# Patient Record
Sex: FEMALE | Race: WHITE | NOT HISPANIC OR LATINO | Employment: OTHER | URBAN - METROPOLITAN AREA
[De-identification: names, ages, dates, MRNs, and addresses within clinical notes are randomized per-mention and may not be internally consistent; named-entity substitution may affect disease eponyms.]

---

## 2017-01-16 ENCOUNTER — TELEPHONE (OUTPATIENT)
Dept: FAMILY MEDICINE CLINIC | Facility: CLINIC | Age: 82
End: 2017-01-16

## 2017-01-18 ENCOUNTER — TELEPHONE (OUTPATIENT)
Dept: FAMILY MEDICINE CLINIC | Facility: CLINIC | Age: 82
End: 2017-01-18

## 2017-01-18 ENCOUNTER — TRANSITIONAL CARE MANAGEMENT TELEPHONE ENCOUNTER (OUTPATIENT)
Dept: INTERNAL MEDICINE | Facility: CLINIC | Age: 82
End: 2017-01-18

## 2017-01-18 NOTE — TELEPHONE ENCOUNTER
----- Message from Melvina Winn sent at 1/18/2017  2:15 PM EST -----  Contact: DR RICHARD [facility] IS CALLING TO LET YOU KNOW THAT SHE WAS DISCHARGED HOME YESTERDAY.

## 2017-01-19 NOTE — TELEPHONE ENCOUNTER
DEONTE call already completed. Pt discharged home on 1/17/17.  Please refer to TCM call flowsheet for call documentation.

## 2017-01-20 ENCOUNTER — TELEPHONE (OUTPATIENT)
Dept: FAMILY MEDICINE CLINIC | Facility: CLINIC | Age: 82
End: 2017-01-20

## 2017-01-20 NOTE — TELEPHONE ENCOUNTER
----- Message from Theresa Arias sent at 1/20/2017 12:41 PM EST -----  Contact: JOSE MANUEL FERRELL [facility]  IS REQUESTING AN ORDER FOR 10 HOME VISITS FOR FOR HER ADL ENERGY CONSERVATIONS TECHNIQUES DUE TO SHORTNESS OF BREATH PLEASE CALL HER  [phone] OR  [phone]

## 2017-01-27 ENCOUNTER — TELEPHONE (OUTPATIENT)
Dept: FAMILY MEDICINE CLINIC | Facility: CLINIC | Age: 82
End: 2017-01-27

## 2017-01-27 NOTE — TELEPHONE ENCOUNTER
----- Message from Rosa Woodward sent at 1/26/2017 11:52 AM EST -----  Contact: JOSE MANUEL FERRELL ASKING IF     GLUCOMETER  AND STRIPS    [facility] [place]

## 2017-02-03 ENCOUNTER — TELEPHONE (OUTPATIENT)
Dept: FAMILY MEDICINE CLINIC | Facility: CLINIC | Age: 82
End: 2017-02-03

## 2017-02-03 NOTE — TELEPHONE ENCOUNTER
----- Message from Theresa Arias sent at 2/3/2017 11:25 AM EST -----  Contact: JOSE MANUEL VALDEZ [facility]  THE OCCUPATIONAL THERAPIST WITH [facility] CALLED TO ADVISE THAT DUE TO AN INSURANCE CHANGE THEY WERE UNABLE TO  MAKE THE TWO APPOINTMENT SCHEDULED THIS WEEK FOR THE PATIENT IF THERE ARE ANY QUESTIONS PLEASE CALL [facility]  [phone]

## 2017-02-08 ENCOUNTER — TELEPHONE (OUTPATIENT)
Dept: FAMILY MEDICINE CLINIC | Facility: CLINIC | Age: 82
End: 2017-02-08

## 2017-02-08 RX ORDER — AMLODIPINE BESYLATE 10 MG/1
10 TABLET ORAL DAILY
Qty: 30 TABLET | Refills: 2 | Status: SHIPPED | OUTPATIENT
Start: 2017-02-08 | End: 2017-02-17

## 2017-02-08 NOTE — TELEPHONE ENCOUNTER
I am going to add norvasc to regimen for better BP control.  Script sent in, call with update in the next 2 weeks and recommend f/u appointment to recheck this INB at that time.

## 2017-02-08 NOTE — TELEPHONE ENCOUNTER
Kristan with  PT called pt's b/p was 220/85. She is aware that it does run on the high side however she was wanting to know if there was a parameter were you wanted to see her or for them not to do PT with her. Pt isnot having any sx. [phone]

## 2017-02-09 ENCOUNTER — TELEPHONE (OUTPATIENT)
Dept: FAMILY MEDICINE CLINIC | Facility: CLINIC | Age: 82
End: 2017-02-09

## 2017-02-13 ENCOUNTER — TELEPHONE (OUTPATIENT)
Dept: FAMILY MEDICINE CLINIC | Facility: CLINIC | Age: 82
End: 2017-02-13

## 2017-02-13 NOTE — TELEPHONE ENCOUNTER
Elevated BP has been an issue when pt does not take her medicine.  Safe BP level for pt to work with would be less than 160/90

## 2017-02-13 NOTE — TELEPHONE ENCOUNTER
----- Message from Rosa Ng sent at 2/13/2017 11:42 AM EST -----  Contact: VALERIY MARTINEZ FROM hospitals  PT'S BP IS RUNNING HIGH; 240/95  IS THERE A RANGE THAT IS STILL SAFE TO WORK WITH  HER?      [phone]  MESSAGE OK

## 2017-02-13 NOTE — TELEPHONE ENCOUNTER
I tried to call and discuss with pt no answer on either number. [facility] should be [facility]'s from what she got filled in December and January. I spoke with jigna she will discuss with pt when she see her in the morning.

## 2017-02-15 ENCOUNTER — TELEPHONE (OUTPATIENT)
Dept: FAMILY MEDICINE CLINIC | Facility: CLINIC | Age: 82
End: 2017-02-15

## 2017-02-15 NOTE — TELEPHONE ENCOUNTER
----- Message from Amy Quinteros sent at 2/15/2017 12:26 PM EST -----  Contact: Andres Kolb from [facility] is calling to let Dr. Campos know that patient's blood pressure was 177/78 today. Please call Kennedi [phone].

## 2017-02-15 NOTE — TELEPHONE ENCOUNTER
i spoke with jolynn who confirmed with pt that she is taking the lisinporil only. It looks like she should be on two or more b/p meds. Per pharm she has not been getting them filled however jolynn did say she has a bottle that appears to be lisinopril just not sure she is taking. Not OT again today due to b/p readings. If she continues to be noncompliant she may be dicharged from

## 2017-02-15 NOTE — TELEPHONE ENCOUNTER
She should be on lisinopril and HCTZ.  If BP still high than norvasc has been sent in to pharmacy.

## 2017-02-16 ENCOUNTER — TELEPHONE (OUTPATIENT)
Dept: FAMILY MEDICINE CLINIC | Facility: CLINIC | Age: 82
End: 2017-02-16

## 2017-02-16 NOTE — TELEPHONE ENCOUNTER
----- Message from Melvina Winn sent at 2/16/2017  8:17 AM EST -----  [facility] IS CALLING TO LET YOU KNOW THAT HER BP IS ELEVATED THIS MORNING /80. DO YOU WANT HER TO CALL EVERY TIME HER BP IS ELEVATED OR JUST CONTINUE TO MONITER HER FOR THE TWO WEEK PERIOD AND GET BACK WITH YOU AT THE END OF THE TWO WEEK PERIOD?  [phone]

## 2017-02-17 RX ORDER — ATENOLOL 50 MG/1
50 TABLET ORAL DAILY
Qty: 30 TABLET | Refills: 1 | Status: SHIPPED | OUTPATIENT
Start: 2017-02-17 | End: 2017-02-17

## 2017-02-17 RX ORDER — AMLODIPINE BESYLATE 10 MG/1
10 TABLET ORAL DAILY
Qty: 30 TABLET | Refills: 2 | Status: SHIPPED | OUTPATIENT
Start: 2017-02-17 | End: 2017-12-26 | Stop reason: SDUPTHER

## 2017-02-17 NOTE — TELEPHONE ENCOUNTER
After calling pt again.....she did  the norvasc today. I confirmed her has lisinopril, hctc, and norvac on hand and advised her to take all three. Atenolol cancelled.

## 2017-02-21 ENCOUNTER — TELEPHONE (OUTPATIENT)
Dept: FAMILY MEDICINE CLINIC | Facility: CLINIC | Age: 82
End: 2017-02-21

## 2017-02-21 NOTE — TELEPHONE ENCOUNTER
----- Message from Rosa Ng sent at 2/21/2017  1:52 PM EST -----  Contact: CHRIS [facility]  PT PAIN LEVEL IS 9 OUT OF 10-SHE DOESN'T WANT TO GO SEE THE DOCTOR  THE NURSE IS CONCERNED THAT THE NEW BLOOD PRESSURE MEDS MAY  BE EFFECTING IT;THE BP IS WITHIN NORMAL RANGE-NURSE WANTED TO  MAKE SURE SHE REPORTED IT    [phone]

## 2017-02-23 ENCOUNTER — TELEPHONE (OUTPATIENT)
Dept: FAMILY MEDICINE CLINIC | Facility: CLINIC | Age: 82
End: 2017-02-23

## 2017-02-23 RX ORDER — NITROGLYCERIN 0.4 MG/1
TABLET SUBLINGUAL
Qty: 10 TABLET | Refills: 3 | Status: SHIPPED | OUTPATIENT
Start: 2017-02-23

## 2017-02-23 NOTE — TELEPHONE ENCOUNTER
----- Message from Melvina Winn sent at 2/23/2017  1:08 PM EST -----  Contact: DR RICHARD [facility]  [facility]    /  Sent in Nitro earlier today (Marely)

## 2017-02-23 NOTE — TELEPHONE ENCOUNTER
----- Message from Mayelin Jones sent at 2/23/2017 12:47 PM EST -----  Contact: DR. RICHARD[facility] CALLED WITH QUESTIONS ABOUT THE PT'S MEDICATIONS.   SHE WANTED TO KNOW THE FOLLOWING QUESTIONS AND INFORMATION      # IS THE PT STILL SUPPOSED TO BE TAKING amitriptyline (ELAVIL) 10 MG tablet,  HER RANITIDINE 2 TIMES DAILY AND  81 MG ASPRIN?     # PT  HAS BEEN WAKING UP WITH HEADACHES DAILY BUT HER BP IS FINE.     # PT HAS NOT PICKED UP HER ATENOLOL DUE TO SHE IS NOT ABLE TO AFFORD IT.     CB #   [phone]

## 2017-02-24 ENCOUNTER — TELEPHONE (OUTPATIENT)
Dept: FAMILY MEDICINE CLINIC | Facility: CLINIC | Age: 82
End: 2017-02-24

## 2017-02-24 NOTE — TELEPHONE ENCOUNTER
----- Message from Rosa Woodward sent at 2/24/2017  3:09 PM EST -----  Contact: VALERIA ATKINSON FROM [facility] CALLING TO A VERBAL ORDER FOR:    MONITOR O2 STATS WITH PHYSICAL THERAPY    [phone], PLEASE LEAVE A VOICE MAIL IF SHE DOESN'T ANSWER

## 2017-03-01 ENCOUNTER — TELEPHONE (OUTPATIENT)
Dept: FAMILY MEDICINE CLINIC | Facility: CLINIC | Age: 82
End: 2017-03-01

## 2017-03-01 NOTE — TELEPHONE ENCOUNTER
KANWAL [facility] called advised pt c/o abdominal pain which is chronic for her. They referred her to her pcp which she refused. They also wanted a verbal order for  consult. Order given

## 2017-03-07 DIAGNOSIS — G89.29 CHRONIC BILATERAL LOW BACK PAIN WITH BILATERAL SCIATICA: ICD-10-CM

## 2017-03-07 DIAGNOSIS — M54.41 CHRONIC BILATERAL LOW BACK PAIN WITH BILATERAL SCIATICA: ICD-10-CM

## 2017-03-07 DIAGNOSIS — M54.42 CHRONIC BILATERAL LOW BACK PAIN WITH BILATERAL SCIATICA: ICD-10-CM

## 2017-03-08 ENCOUNTER — TELEPHONE (OUTPATIENT)
Dept: FAMILY MEDICINE CLINIC | Facility: CLINIC | Age: 82
End: 2017-03-08

## 2017-03-08 NOTE — TELEPHONE ENCOUNTER
"Kennedi in OT dept at [facility] called to report pt reported to her her BLE pain is a \"7\" out of \"10\". However, she states this is her \"normal\" and she \"is used to it\".  States her pain level doesn't vary much and wasn't very specific about the pain.  Kennedi reports pt looks good. States she just has to report it. Kennedi may be reached at [phone] if needed.  "

## 2017-03-10 ENCOUNTER — TELEPHONE (OUTPATIENT)
Dept: FAMILY MEDICINE CLINIC | Facility: CLINIC | Age: 82
End: 2017-03-10

## 2017-03-10 NOTE — TELEPHONE ENCOUNTER
----- Message from Melvina Winn sent at 3/10/2017 11:36 AM EST -----  Contact: DR RICHARD [facility]  OCCUPATIONAL THERAPIST FROM [facility] IS CALLING TO REPORT CHEST PAIN. SHE IS SAYING IT IS A 3 OUT OF 10 ON PAIN SCALE. THE OCCUPATIONAL THERAPIST HAS REFERRED HER TO US AND SHE IS REFUSING TO COME HERE. JUST WANTED TO REPORT WHAT IS GOING ON.  [phone]

## 2017-03-12 RX ORDER — GABAPENTIN 600 MG/1
TABLET ORAL
Qty: 270 TABLET | Refills: 0 | Status: SHIPPED | OUTPATIENT
Start: 2017-03-12 | End: 2017-07-01 | Stop reason: SDUPTHER

## 2017-03-24 ENCOUNTER — TELEPHONE (OUTPATIENT)
Dept: FAMILY MEDICINE CLINIC | Facility: CLINIC | Age: 82
End: 2017-03-24

## 2017-03-24 NOTE — TELEPHONE ENCOUNTER
----- Message from Mayelin Jones sent at 3/24/2017  1:04 PM EDT -----  Contact: Dr. negron; Marla from [facility]   Home health called to let Dr. negron know paula is making the pt dizzy and she is having falls with it but she is catching her self before she actually falls. She has only been on the medication for about a weeks.     Marla milian with [facility]   [phone]

## 2017-03-24 NOTE — TELEPHONE ENCOUNTER
Spoke with Marla and advised of MD comment. Marla verbalized understanding and will make sure pt gets message.

## 2017-03-24 NOTE — TELEPHONE ENCOUNTER
Writer took a call from Kennedi Lambert from OT with [facility] who is asking for cont OT services with pt for 2 wks. V.O. Given. Verbalized understanding. KANWAL

## 2017-03-28 ENCOUNTER — TELEPHONE (OUTPATIENT)
Dept: FAMILY MEDICINE CLINIC | Facility: CLINIC | Age: 82
End: 2017-03-28

## 2017-03-28 NOTE — TELEPHONE ENCOUNTER
----- Message from Theresa Arias sent at 3/28/2017  1:33 PM EDT -----  Contact: JOSE MANUEL FERRELL  CALLED TO REQUEST A VERBAL DUE TO PATIENT IS EXPERIENCING DIZZINESS AND EDEMA ON HER RIGHT FOOT SHE WOULD LIKE A CALL BACK  [phone]

## 2017-03-28 NOTE — TELEPHONE ENCOUNTER
Spoke with kary pt was d/c a few weeks ago however she is now having edema, she fell and having orthostatic hypotension. They would like to go back to seeing her this week. Verbal order given

## 2017-03-30 ENCOUNTER — TELEPHONE (OUTPATIENT)
Dept: FAMILY MEDICINE CLINIC | Facility: CLINIC | Age: 82
End: 2017-03-30

## 2017-04-03 NOTE — TELEPHONE ENCOUNTER
Spoke with son? Advised pt did not go to ER and that she is feeling fine. Could not get much info from him

## 2017-04-04 ENCOUNTER — TELEPHONE (OUTPATIENT)
Dept: FAMILY MEDICINE CLINIC | Facility: CLINIC | Age: 82
End: 2017-04-04

## 2017-04-04 NOTE — TELEPHONE ENCOUNTER
----- Message from Theresa Arias sent at 4/4/2017 12:44 PM EDT -----  Contact: JOSE MANUEL/BIBI GOMEZ [facility]  CALLED TO ADVISE THAT THE PATIENT IS EXPERIENCING AN ADEMA AND ORTHOSETIC HYPOTENSION ON HER LEFT FOOT ONLY. WHICH IS A NEW SYMPTOM FOR HER. SHE WANTED TO ADVISE IF THEIR ARE QUESTIONS PLEASE CALL HER  [phone]

## 2017-04-05 ENCOUNTER — TELEPHONE (OUTPATIENT)
Dept: FAMILY MEDICINE CLINIC | Facility: CLINIC | Age: 82
End: 2017-04-05

## 2017-04-05 NOTE — TELEPHONE ENCOUNTER
----- Message from Theresa Arias sent at 4/5/2017  2:06 PM EDT -----  Contact[facility]  IS SEEKING VERBAL ORDERS FOR THE PATIENTS  OCCUPATIONAL THERAPY FOR 4 WEEKS   PLEASE CALL HER  [phone]

## 2017-04-07 ENCOUNTER — TELEPHONE (OUTPATIENT)
Dept: FAMILY MEDICINE CLINIC | Facility: CLINIC | Age: 82
End: 2017-04-07

## 2017-04-07 NOTE — TELEPHONE ENCOUNTER
----- Message from Melvina Winn sent at 4/7/2017  1:43 PM EDT -----  Contact: DR RICHARD HOME HEALTH CALL  [facility] IS CALLING TO REPORT THAT THE PAIN IS HAVING  8 OUT 10 ON THE PAIN SCALE  OF HER RIGHT LEG AND RADIATES DOWN. PATIENT SAYS ITS CHRONIC BUT SHE HAS NEVER MENTIONED IT BEFORE TO THE HOME HEALTH NURSE.  [phone]

## 2017-04-10 ENCOUNTER — TELEPHONE (OUTPATIENT)
Dept: FAMILY MEDICINE CLINIC | Facility: CLINIC | Age: 82
End: 2017-04-10

## 2017-04-10 RX ORDER — ALBUTEROL SULFATE 90 UG/1
AEROSOL, METERED RESPIRATORY (INHALATION)
Qty: 1 INHALER | Refills: 5 | Status: SHIPPED | OUTPATIENT
Start: 2017-04-10 | End: 2019-01-01 | Stop reason: SDUPTHER

## 2017-04-10 NOTE — TELEPHONE ENCOUNTER
----- Message from Rosa Woodward sent at 4/10/2017 12:39 PM EDT -----  Contact: JOSE MANUEL  PATIENT WOULD LIKE TO HAVE SOME INHALERS SENT TO THE PHARMACY. SHE DIDN'T KNOW THE NAME, SHE SAID YOU CALLED THEM INTO THE PHARMACY FOR HER.    HAVING TROUBLE BREATHING, CAN'T GET OUT OF THE HOUSE.    [facility]    [phone]

## 2017-04-20 ENCOUNTER — TELEPHONE (OUTPATIENT)
Dept: FAMILY MEDICINE CLINIC | Facility: CLINIC | Age: 82
End: 2017-04-20

## 2017-04-20 NOTE — TELEPHONE ENCOUNTER
KANWAL:    Kennedi Martin (O.T.) from [facility] [phone] called because she was out at patients home and she complained of acute chest pain, started before Kennedi got there. Patient reported the pain was a 5 out of 10 but Kennedi was worried because it was new. I informed her to have pt go to ER for eval and then sched a fu appt with us.

## 2017-04-21 ENCOUNTER — TELEPHONE (OUTPATIENT)
Dept: FAMILY MEDICINE CLINIC | Facility: CLINIC | Age: 82
End: 2017-04-21

## 2017-04-21 NOTE — TELEPHONE ENCOUNTER
----- Message from Rosa Ng sent at 4/21/2017  1:43 PM EDT -----  Contact: [facility]  PT WAS SEEN TODAY-PT NEEDS TO GO TO ER AND PT REFUSES-  EMT WAS CALL YESTERDAY BUT REFUSED TO GO-VAMSI FEELS SHE  HAS HAD A STROKE-PT HAS STRENGTH WHEN SITTING BUT DOESN'T  WALK WELL-VITALS ARE GOOD-HAVING NO STRENGTH IN RIGHT ARM  OR LEG  HER SISTER HAS PASTED AWAY YESTERDAY  NURSE FEELS SHE IS AT A LOSE BECAUSE PT WILL NOT GO   TO ER  VAMSI [phone]  MESSAGE OK

## 2017-06-02 ENCOUNTER — HOSPITAL ENCOUNTER (OUTPATIENT)
Facility: HOSPITAL | Age: 82
Setting detail: OBSERVATION
Discharge: HOME | End: 2017-06-05
Attending: EMERGENCY MEDICINE | Admitting: INTERNAL MEDICINE

## 2017-06-02 ENCOUNTER — APPOINTMENT (OUTPATIENT)
Dept: GENERAL RADIOLOGY | Facility: HOSPITAL | Age: 82
End: 2017-06-02

## 2017-06-02 DIAGNOSIS — Z86.79 HISTORY OF CORONARY ARTERY DISEASE: ICD-10-CM

## 2017-06-02 DIAGNOSIS — I24.9 ACUTE CORONARY SYNDROME (HCC): Primary | ICD-10-CM

## 2017-06-02 PROBLEM — I20.0 UNSTABLE ANGINA (HCC): Status: ACTIVE | Noted: 2017-06-02

## 2017-06-02 LAB
ALBUMIN SERPL-MCNC: 4.2 G/DL (ref 3.2–4.8)
ALBUMIN/GLOB SERPL: 1.2 G/DL (ref 1.5–2.5)
ALP SERPL-CCNC: 69 U/L (ref 25–100)
ALT SERPL W P-5'-P-CCNC: 5 U/L (ref 7–40)
ANION GAP SERPL CALCULATED.3IONS-SCNC: 2 MMOL/L (ref 3–11)
ARTICHOKE IGE QN: 134 MG/DL (ref 0–130)
AST SERPL-CCNC: 10 U/L (ref 0–33)
BASOPHILS # BLD AUTO: 0.03 10*3/MM3 (ref 0–0.2)
BASOPHILS NFR BLD AUTO: 0.5 % (ref 0–1)
BILIRUB SERPL-MCNC: 0.3 MG/DL (ref 0.3–1.2)
BNP SERPL-MCNC: 410 PG/ML (ref 0–100)
BUN BLD-MCNC: 15 MG/DL (ref 9–23)
BUN/CREAT SERPL: 18.8 (ref 7–25)
CALCIUM SPEC-SCNC: 9.8 MG/DL (ref 8.7–10.4)
CHLORIDE SERPL-SCNC: 105 MMOL/L (ref 99–109)
CHOLEST SERPL-MCNC: 212 MG/DL (ref 0–200)
CK MB SERPL-CCNC: 1.6 NG/ML (ref 0–5)
CO2 SERPL-SCNC: 33 MMOL/L (ref 20–31)
CREAT BLD-MCNC: 0.8 MG/DL (ref 0.6–1.3)
DEPRECATED RDW RBC AUTO: 54.1 FL (ref 37–54)
EOSINOPHIL # BLD AUTO: 0.07 10*3/MM3 (ref 0.1–0.3)
EOSINOPHIL NFR BLD AUTO: 1.2 % (ref 0–3)
ERYTHROCYTE [DISTWIDTH] IN BLOOD BY AUTOMATED COUNT: 16.1 % (ref 11.3–14.5)
GFR SERPL CREATININE-BSD FRML MDRD: 69 ML/MIN/1.73
GLOBULIN UR ELPH-MCNC: 3.5 GM/DL
GLUCOSE BLD-MCNC: 189 MG/DL (ref 70–100)
HBA1C MFR BLD: 5.6 % (ref 4.8–5.6)
HCT VFR BLD AUTO: 40.6 % (ref 34.5–44)
HDLC SERPL-MCNC: 56 MG/DL (ref 40–60)
HGB BLD-MCNC: 11.7 G/DL (ref 11.5–15.5)
HOLD SPECIMEN: NORMAL
HOLD SPECIMEN: NORMAL
IMM GRANULOCYTES # BLD: 0.01 10*3/MM3 (ref 0–0.03)
IMM GRANULOCYTES NFR BLD: 0.2 % (ref 0–0.6)
LIPASE SERPL-CCNC: 22 U/L (ref 6–51)
LYMPHOCYTES # BLD AUTO: 1.25 10*3/MM3 (ref 0.6–4.8)
LYMPHOCYTES NFR BLD AUTO: 22 % (ref 24–44)
MCH RBC QN AUTO: 26.4 PG (ref 27–31)
MCHC RBC AUTO-ENTMCNC: 28.8 G/DL (ref 32–36)
MCV RBC AUTO: 91.4 FL (ref 80–99)
MONOCYTES # BLD AUTO: 0.28 10*3/MM3 (ref 0–1)
MONOCYTES NFR BLD AUTO: 4.9 % (ref 0–12)
NEUTROPHILS # BLD AUTO: 4.03 10*3/MM3 (ref 1.5–8.3)
NEUTROPHILS NFR BLD AUTO: 71.2 % (ref 41–71)
PLATELET # BLD AUTO: 222 10*3/MM3 (ref 150–450)
PMV BLD AUTO: 9.9 FL (ref 6–12)
POTASSIUM BLD-SCNC: 3.4 MMOL/L (ref 3.5–5.5)
PROT SERPL-MCNC: 7.7 G/DL (ref 5.7–8.2)
RBC # BLD AUTO: 4.44 10*6/MM3 (ref 3.89–5.14)
SODIUM BLD-SCNC: 140 MMOL/L (ref 132–146)
TRIGL SERPL-MCNC: 144 MG/DL (ref 0–150)
TROPONIN I SERPL-MCNC: 0.01 NG/ML (ref 0–0.07)
TROPONIN I SERPL-MCNC: 0.01 NG/ML (ref 0–0.07)
TROPONIN I SERPL-MCNC: 0.07 NG/ML
WBC NRBC COR # BLD: 5.67 10*3/MM3 (ref 3.5–10.8)
WHOLE BLOOD HOLD SPECIMEN: NORMAL
WHOLE BLOOD HOLD SPECIMEN: NORMAL

## 2017-06-02 PROCEDURE — 83880 ASSAY OF NATRIURETIC PEPTIDE: CPT | Performed by: EMERGENCY MEDICINE

## 2017-06-02 PROCEDURE — 93005 ELECTROCARDIOGRAM TRACING: CPT

## 2017-06-02 PROCEDURE — 25010000002 ENOXAPARIN PER 10 MG: Performed by: PHYSICIAN ASSISTANT

## 2017-06-02 PROCEDURE — 83690 ASSAY OF LIPASE: CPT | Performed by: EMERGENCY MEDICINE

## 2017-06-02 PROCEDURE — 84484 ASSAY OF TROPONIN QUANT: CPT | Performed by: PHYSICIAN ASSISTANT

## 2017-06-02 PROCEDURE — 85025 COMPLETE CBC W/AUTO DIFF WBC: CPT | Performed by: EMERGENCY MEDICINE

## 2017-06-02 PROCEDURE — 82553 CREATINE MB FRACTION: CPT | Performed by: PHYSICIAN ASSISTANT

## 2017-06-02 PROCEDURE — 99220 PR INITIAL OBSERVATION CARE/DAY 70 MINUTES: CPT | Performed by: INTERNAL MEDICINE

## 2017-06-02 PROCEDURE — 80061 LIPID PANEL: CPT | Performed by: PHYSICIAN ASSISTANT

## 2017-06-02 PROCEDURE — 83036 HEMOGLOBIN GLYCOSYLATED A1C: CPT | Performed by: PHYSICIAN ASSISTANT

## 2017-06-02 PROCEDURE — 84484 ASSAY OF TROPONIN QUANT: CPT

## 2017-06-02 PROCEDURE — 99285 EMERGENCY DEPT VISIT HI MDM: CPT

## 2017-06-02 PROCEDURE — G0378 HOSPITAL OBSERVATION PER HR: HCPCS

## 2017-06-02 PROCEDURE — 96372 THER/PROPH/DIAG INJ SC/IM: CPT

## 2017-06-02 PROCEDURE — 93005 ELECTROCARDIOGRAM TRACING: CPT | Performed by: PHYSICIAN ASSISTANT

## 2017-06-02 PROCEDURE — 94799 UNLISTED PULMONARY SVC/PX: CPT

## 2017-06-02 PROCEDURE — 71010 HC CHEST PA OR AP: CPT

## 2017-06-02 PROCEDURE — 80053 COMPREHEN METABOLIC PANEL: CPT | Performed by: EMERGENCY MEDICINE

## 2017-06-02 RX ORDER — ASPIRIN 81 MG/1
324 TABLET, CHEWABLE ORAL ONCE
Status: DISCONTINUED | OUTPATIENT
Start: 2017-06-02 | End: 2017-06-02

## 2017-06-02 RX ORDER — PANTOPRAZOLE SODIUM 40 MG/1
40 TABLET, DELAYED RELEASE ORAL
Status: DISCONTINUED | OUTPATIENT
Start: 2017-06-03 | End: 2017-06-05 | Stop reason: HOSPADM

## 2017-06-02 RX ORDER — LISINOPRIL 10 MG/1
20 TABLET ORAL
Status: DISCONTINUED | OUTPATIENT
Start: 2017-06-03 | End: 2017-06-04

## 2017-06-02 RX ORDER — NITROGLYCERIN 20 MG/100ML
5-200 INJECTION INTRAVENOUS
Status: DISCONTINUED | OUTPATIENT
Start: 2017-06-02 | End: 2017-06-03

## 2017-06-02 RX ORDER — ISOSORBIDE MONONITRATE 60 MG/1
60 TABLET, EXTENDED RELEASE ORAL DAILY
Status: DISCONTINUED | OUTPATIENT
Start: 2017-06-02 | End: 2017-06-05 | Stop reason: HOSPADM

## 2017-06-02 RX ORDER — LISINOPRIL 10 MG/1
30 TABLET ORAL
Status: DISCONTINUED | OUTPATIENT
Start: 2017-06-03 | End: 2017-06-02

## 2017-06-02 RX ORDER — RANITIDINE 150 MG/1
150 TABLET ORAL 2 TIMES DAILY PRN
COMMUNITY
End: 2019-01-01

## 2017-06-02 RX ORDER — SODIUM CHLORIDE 0.9 % (FLUSH) 0.9 %
10 SYRINGE (ML) INJECTION AS NEEDED
Status: DISCONTINUED | OUTPATIENT
Start: 2017-06-02 | End: 2017-06-05 | Stop reason: HOSPADM

## 2017-06-02 RX ORDER — LISINOPRIL AND HYDROCHLOROTHIAZIDE 25; 20 MG/1; MG/1
1 TABLET ORAL DAILY
COMMUNITY
End: 2017-12-26 | Stop reason: SDUPTHER

## 2017-06-02 RX ORDER — SODIUM CHLORIDE 0.9 % (FLUSH) 0.9 %
1-10 SYRINGE (ML) INJECTION AS NEEDED
Status: DISCONTINUED | OUTPATIENT
Start: 2017-06-02 | End: 2017-06-05 | Stop reason: HOSPADM

## 2017-06-02 RX ORDER — AMLODIPINE BESYLATE 10 MG/1
10 TABLET ORAL
Status: DISCONTINUED | OUTPATIENT
Start: 2017-06-03 | End: 2017-06-05 | Stop reason: HOSPADM

## 2017-06-02 RX ORDER — AMLODIPINE BESYLATE 10 MG/1
10 TABLET ORAL ONCE
Status: DISCONTINUED | OUTPATIENT
Start: 2017-06-02 | End: 2017-06-05 | Stop reason: HOSPADM

## 2017-06-02 RX ORDER — HYDROCODONE BITARTRATE AND ACETAMINOPHEN 7.5; 325 MG/1; MG/1
1 TABLET ORAL EVERY 8 HOURS PRN
Status: DISCONTINUED | OUTPATIENT
Start: 2017-06-02 | End: 2017-06-05 | Stop reason: HOSPADM

## 2017-06-02 RX ORDER — AMITRIPTYLINE HYDROCHLORIDE 25 MG/1
25 TABLET, FILM COATED ORAL NIGHTLY
Status: DISCONTINUED | OUTPATIENT
Start: 2017-06-02 | End: 2017-06-05 | Stop reason: HOSPADM

## 2017-06-02 RX ORDER — ATENOLOL 50 MG/1
50 TABLET ORAL DAILY
COMMUNITY
End: 2017-06-05 | Stop reason: HOSPADM

## 2017-06-02 RX ORDER — ASPIRIN 81 MG/1
81 TABLET ORAL DAILY
Status: DISCONTINUED | OUTPATIENT
Start: 2017-06-02 | End: 2017-06-05 | Stop reason: HOSPADM

## 2017-06-02 RX ORDER — ATORVASTATIN CALCIUM 40 MG/1
80 TABLET, FILM COATED ORAL NIGHTLY
Status: DISCONTINUED | OUTPATIENT
Start: 2017-06-02 | End: 2017-06-05 | Stop reason: HOSPADM

## 2017-06-02 RX ORDER — ROPINIROLE 2 MG/1
2 TABLET, FILM COATED ORAL NIGHTLY
COMMUNITY
End: 2019-01-01

## 2017-06-02 RX ORDER — ASPIRIN 325 MG
325 TABLET ORAL DAILY
COMMUNITY
End: 2019-01-01 | Stop reason: HOSPADM

## 2017-06-02 RX ORDER — ISOSORBIDE MONONITRATE 60 MG/1
60 TABLET, EXTENDED RELEASE ORAL DAILY
COMMUNITY
End: 2017-12-26 | Stop reason: SDUPTHER

## 2017-06-02 RX ORDER — CLOPIDOGREL BISULFATE 75 MG/1
75 TABLET ORAL DAILY
Status: DISCONTINUED | OUTPATIENT
Start: 2017-06-02 | End: 2017-06-05 | Stop reason: HOSPADM

## 2017-06-02 RX ADMIN — ISOSORBIDE MONONITRATE 60 MG: 60 TABLET, EXTENDED RELEASE ORAL at 17:43

## 2017-06-02 RX ADMIN — AMITRIPTYLINE HYDROCHLORIDE 25 MG: 25 TABLET, FILM COATED ORAL at 20:46

## 2017-06-02 RX ADMIN — METOPROLOL TARTRATE 25 MG: 25 TABLET ORAL at 20:46

## 2017-06-02 RX ADMIN — ATORVASTATIN CALCIUM 80 MG: 40 TABLET, FILM COATED ORAL at 20:45

## 2017-06-02 RX ADMIN — ALBUTEROL SULFATE 2.5 MG: 2.5 SOLUTION RESPIRATORY (INHALATION) at 18:52

## 2017-06-02 RX ADMIN — NITROGLYCERIN 10 MCG/MIN: 20 INJECTION INTRAVENOUS at 15:44

## 2017-06-02 RX ADMIN — ENOXAPARIN SODIUM 50 MG: 60 INJECTION SUBCUTANEOUS at 20:46

## 2017-06-02 RX ADMIN — CLOPIDOGREL BISULFATE 75 MG: 75 TABLET ORAL at 17:43

## 2017-06-02 RX ADMIN — HYDROCODONE BITARTRATE AND ACETAMINOPHEN 1 TABLET: 7.5; 325 TABLET ORAL at 20:46

## 2017-06-02 RX ADMIN — ASPIRIN 81 MG: 81 TABLET, COATED ORAL at 17:42

## 2017-06-02 NOTE — ED PROVIDER NOTES
Subjective   HPI Comments: Angela Berkowitz is a 82 y.o.female with a history of CAD, CHF, COPD, and DM who presents to the ED with c/o substernal chest pain and SOA which started 1 hour prior to arrival. She describes the pain in her chest as sharp and intermittent which has reduced in frequency since time of onset. She states that she took aspirin today with no relief. Her chest pain is worse with exertion or movement and improves with rest. Additionally she c/o headache, chills, abdominal pain, and leg pain but denies fevers, cough, congestion, cold symptoms, congestion, or any other acute complaints at this time.         Patient is a 82 y.o. female presenting with chest pain.   History provided by:  Patient  Chest Pain   Pain location:  Substernal area  Pain quality: sharp    Pain radiates to:  Does not radiate  Pain severity:  Moderate  Onset quality:  Sudden  Duration:  1 hour  Timing:  Intermittent  Progression:  Improving  Chronicity:  New  Context comment:  Hx CAD, CHF, COPD  Relieved by:  Rest  Worsened by:  Exertion and movement  Ineffective treatments:  None tried  Associated symptoms: abdominal pain, headache and shortness of breath    Associated symptoms: no back pain, no diaphoresis, no fever, no nausea and no vomiting    Risk factors: coronary artery disease and diabetes mellitus        Review of Systems   Constitutional: Positive for chills. Negative for diaphoresis and fever.   HENT: Negative for congestion, rhinorrhea and sore throat.    Respiratory: Positive for shortness of breath.    Cardiovascular: Positive for chest pain.   Gastrointestinal: Positive for abdominal pain. Negative for diarrhea, nausea and vomiting.   Musculoskeletal: Negative for back pain and neck pain.   Neurological: Positive for headaches.   All other systems reviewed and are negative.      Past Medical History:   Diagnosis Date   • Back pain    • CAD (coronary artery disease)    • CHF (congestive heart failure)    • Chronic  low back pain    • COPD (chronic obstructive pulmonary disease)    • Dependence on supplemental oxygen    • Diverticulosis    • DM2 (diabetes mellitus, type 2)    • DEA (generalized anxiety disorder)    • GERD (gastroesophageal reflux disease)    • History of cancer of uterus     status post hysterectomy   • History of myocardial infarction    • History of stroke    • HLD (hyperlipidemia)    • Hypertension    • Osteoarthritis of lumbar spine    • Pernicious anemia    • Unsteady gait        Allergies   Allergen Reactions   • Pneumovax [Pneumococcal Polysaccharide Vaccine]        Past Surgical History:   Procedure Laterality Date   • APPENDECTOMY     • CARDIAC CATHETERIZATION N/A 8/1/2016    Procedure: Left Heart Cath;  Surgeon: Anupam Bernal MD;  [facility]: [facility] CATH INVASIVE LOCATION;  Service:    • CHOLECYSTECTOMY     • COLONOSCOPY  09/2015   • CORONARY ARTERY BYPASS GRAFT  1997    x3 in 1997   • CORONARY STENT PLACEMENT  08/2016   • TOTAL ABDOMINAL HYSTERECTOMY      with removal of both ovaries   • UPPER GASTROINTESTINAL ENDOSCOPY  09/2015       History reviewed. No pertinent family history.    Social History     Social History   • Marital status:      Spouse name: N/A   • Number of children: N/A   • Years of education: N/A     Social History Main Topics   • Smoking status: Current Every Day Smoker     Packs/day: 0.25   • Smokeless tobacco: Never Used   • Alcohol use No   • Drug use: No   • Sexual activity: Defer     Other Topics Concern   • None     Social History Narrative         Objective   Physical Exam   Constitutional: She is oriented to person, place, and time. She appears well-developed and well-nourished.   HENT:   Head: Normocephalic and atraumatic.   Eyes: Conjunctivae are normal. Pupils are equal, round, and reactive to light.   Neck: Normal range of motion. Neck supple.   Cardiovascular: Normal rate, regular rhythm and normal heart sounds.    No murmur heard.  Pulmonary/Chest: Effort normal  and breath sounds normal. No respiratory distress.   Abdominal: Soft. There is no tenderness.   Musculoskeletal: Normal range of motion. She exhibits no edema.   Neurological: She is alert and oriented to person, place, and time.   Skin: Skin is warm and dry.   Psychiatric: She has a normal mood and affect. Her behavior is normal.   Nursing note and vitals reviewed.      Procedures         ED Course  ED Course   Comment By Time   I called Dr. Bernal's office and requested a consult. Elijah Browning MD 06/02 5476   I spoke with Dr. Bedolla who has examined Mrs. Berkowitz.  He advises me that he is admitting her to the hospital.  He we'll keep her nothing by mouth and may proceed with cardiac catheterization in the morning. Elijah Browning MD 06/02 4179                     MDM  Number of Diagnoses or Management Options  Acute coronary syndrome: new and requires workup  History of coronary artery disease: established and worsening     Amount and/or Complexity of Data Reviewed  Clinical lab tests: ordered and reviewed  Tests in the radiology section of CPT®: ordered and reviewed  Discuss the patient with other providers: yes  Independent visualization of images, tracings, or specimens: yes    Patient Progress  Patient progress: stable      Final diagnoses:   Acute coronary syndrome   History of coronary artery disease       Documentation assistance provided by ignacio Parada.  Information recorded by the ignacio was done at my direction and has been verified and validated by me.     Vivienne Parada  06/02/17 1516       Elijah Browning MD  06/03/17 6136

## 2017-06-02 NOTE — CONSULTS
[facility]  CARDIOLOGY CONSULTATION NOTE    Angela Berkowitz  : 1935  MRN:[id number]  Home Phone:[phone]    Date of Admission:2017  Date of Consultation: 17    PCP: Gilmar Campos MD    IDENTIFICATION: A 82 y.o. female resident of [facility]    Chief Complaint   Patient presents with   • Chest Pain       PROBLEM LIST:   1. Coronary artery disease  A. CABG : LIMA to LAD, SVG to PDA, SVG to OM.  B. 2013,NSTEMI with 2/3 patent grafts, stenting of the SVG to obtuse marginal .  C. 2013 echo: EF 55-60%, moderate aortic sclerosis and mild tricuspid regurgitation.  D. NSTEMI 2016: patent LIMA to LAD, critical, 99% stenosis of the vein graft to circumflex, s/p ANT, chronically occluded SVG to RCA (previously documented), preserved LVEF   E.  ER presentation for chest pain with normal cardiac enzymes, EKG nonischemic  F. Echo 2017 [facility]: EF normal, Akinesis of the inferior wall, moderately dilated LA, mild TR, RVSP 54mmHg  2. Hypertension  3. Dyslipidemia  4. Type 2 diabetes  5. COPD with chronic home oxygen use  6. GERD  7. Nephrolithiasis  8. Osteoarthritis  9. Surgical history: DELVIS, appendectomy, cholecystectomy  10. Continued tobacco abuse: Less than one pack per day.  11. Chronic abdominal pain    ALLERGIES: No Known Allergies    Home medications:  1. ASA 81 mg daily  2. Clopidogrel 75mg daily  3. Lipitor 80mg nightly  4. Amlodipine 10mg daily  5. Nexium 40mg daily  6. Lasix 20mg BID  7. Neurontin 600mg TID  8. HCTZ 25mg daily  9. Lisinopril 30mg daily  10. Metformin 500mg BID  11. Norco 7.5-325mg q8 h PRN   12. Nitrostat 0.4mg SL PRN  13. Sucralfate 1g QID  14. Albuterol inhaler  15. Tudorza 400mcg inhaler  16. Amitriptyline 10mg qHS    HPI: Mrs. Berkowitz is a pleasant 81 y/o WF with history as noted above who is seen in consultation today regarding chest pain. She notes this started today around noon, and has been progressively  "getting worse. It is midsternal and sharp in nature, but she states it feels similar to her previous pain when she had her MI's. She also has pain in her left arm, no other associated symptoms. She states the SL Nitro provided periodic relief, but her pain has never completely gone away. She is still having pain at this time. Her first troponin is negative. She notes she has had some chest discomfort yesterday and upon further interrogation she notes she has had to use Nitro once per week for chest pain relief. She denies syncope, palpitations, or symptoms of stroke. She wears O2 at all times, on 2L. She continues to smoke, and says 1 pack will last her 3 days.     ROS: All systems have been reviewed and are negative with the exception of those mentioned in the HPI and problem list above.    Surgical History:   Past Surgical History:   Procedure Laterality Date   • APPENDECTOMY     • CARDIAC CATHETERIZATION N/A 8/1/2016    Procedure: Left Heart Cath;  Surgeon: Anupam Bernal MD;  Location: [facility] CATH INVASIVE LOCATION;  Service:    • CHOLECYSTECTOMY     • COLONOSCOPY  09/2015   • CORONARY ARTERY BYPASS GRAFT  1997    x3 in 1997   • CORONARY STENT PLACEMENT  08/2016   • TOTAL ABDOMINAL HYSTERECTOMY      with removal of both ovaries   • UPPER GASTROINTESTINAL ENDOSCOPY  09/2015       Social History:   Social History     Social History   • Marital status:      Spouse name: N/A   • Number of children: N/A   • Years of education: N/A     Occupational History   • Not on file.     Social History Main Topics   • Smoking status: Current Every Day Smoker     Packs/day: 0.25   • Smokeless tobacco: Never Used   • Alcohol use No   • Drug use: No   • Sexual activity: Defer     Family History: History reviewed. No pertinent family history.    Objective     /84  Pulse 72  Temp 97.7 °F (36.5 °C) (Oral)   Resp 24  Ht 63\" (160 cm)  Wt 105 lb (47.6 kg)  SpO2 97%  BMI 18.6 kg/m2  No intake or output data in the 24 " hours ending 06/02/17 1609    PHYSICAL EXAM:  Constitutional:  Well-nourished, cooperative, in no acute distress.   Head:  Normocephalic, without obvious abnormality, atraumatic.   Neck: No adenopathy, supple, trachea midline, no thyromegaly, no    carotid bruit, no JVD.   Respiratory:   Clear to auscultation bilaterally; respirations regular, even and unlabored. No wheezes, rales or ronchi.    Cardiovascular:  Regular rhythm and normal rate, normal S1 and S2, II/VI systolic murmur, no gallop, no rub, no click.   Pulses: Peripheral pulses are palpable but faint.    Extremities: No edema, clubbing or cyanosis.   Skin: Skin is warm and dry. No bleeding, bruising or rash.   Neurological: Alert, oriented to time, person and place. No focal deficits.     Labs/Diagnostic Data    Results from last 7 days  Lab Units 06/02/17  1320   SODIUM mmol/L 140   POTASSIUM mmol/L 3.4*   CHLORIDE mmol/L 105   TOTAL CO2 mmol/L 33.0*   BUN mg/dL 15   CREATININE mg/dL 0.80   GLUCOSE mg/dL 189*   CALCIUM mg/dL 9.8           Results from last 7 days  Lab Units 06/02/17  1320   WBC 10*3/mm3 5.67   HEMOGLOBIN g/dL 11.7   HEMATOCRIT % 40.6   PLATELETS 10*3/mm3 222     Troponin: 0.01  BNP: 410  Echo at OSH in January 2017 at , normal EF with akinesis of the inferior wall, mild TR  [facility] 8/2016  · Triple-vessel disease of the native coronary arteries.  · Patent LIMA graft to the LAD.  · Critical, 99% stenosis of the vein graft to the circumflex (infarct related vessel.)  · Chronically occluded vein graft to the RCA(previously known finding.)  · Preserved left ventricular systolic function, ejection fraction 55%.  · Successful stenting of the vein graft to the circumflex reducing 99% stenosis to no significant residual disease.    Radiology Data:   CXR 6/2/17:  FINDINGS: The heart and vasculature appear normal in size. Mild diffuse  pulmonary interstitial changes are similar to the previous study. No  lung consolidation, edema, effusion, or  pneumothorax is seen.       IMPRESSION:  Stable chronic appearing interstitial lung changes. No new  chest pathology is identified.      Assessment and Plan:     CAD with chest pain  - Troponin 0.01 x 2, two hours apart  - First ECG here today revealed tachycardia and PVCs, but otherwise ST segments without significant change when compared to previous EKG from August, 2016 (LVH with repolarization abnormality)   - Admit for observation overnight  - Repeat troponin at 2000; if negative, no further troponins   - Lovenox 1mg/kg x 1 tonight  - Continue home ASA and clopidogrel, continue home statin  - Continue home Imdur and amlodipine as an anti-anginal   - Added metoprolol 25mg BID  - Holding home Lasix, NPO p MN, possible LHC tomorrow if chest pain not improved or troponin significantly elevated  - If symptoms are stable on medical therapy, may consider discharge tomorrow with close follow up in the cardiology clinic.    Carotid stenosis - Left ICA > 70%, right ICA 50-69%  -Medical therapy and maintaining BP on the higher side  -Possible future angiography; follow up in clinic for further decision making in this regard    Essential Hypertension  - Elevated  - Continue home amlodipine and lisinopril dosing  - Metoprolol as above (on atenolol at home)  - Holding home HCTZ (listed twice on home med list under HCTZ and under lisinopril-HCTZ)    Hyperlipidemia  - on Lipitor 80mg, will check lipid panel; LDL has been high    Diabetes  - A1C     Chronic Abdominal Pain - etiology not known         Scribed for Marco A Bedolla MD by Lola Ayon PA-C. 6/2/2017  4:09 PM    I, Marco A Bedolla MD, personally performed the services as scribed by the above named individual. I have made any necessary edits and it is both accurate and complete.     Marco A Bedolla MD, MSc, [facility]  Interventional Cardiology  [facility]

## 2017-06-03 ENCOUNTER — APPOINTMENT (OUTPATIENT)
Dept: MRI IMAGING | Facility: HOSPITAL | Age: 82
End: 2017-06-03

## 2017-06-03 ENCOUNTER — APPOINTMENT (OUTPATIENT)
Dept: CARDIOLOGY | Facility: HOSPITAL | Age: 82
End: 2017-06-03
Attending: INTERNAL MEDICINE

## 2017-06-03 LAB
ANION GAP SERPL CALCULATED.3IONS-SCNC: 3 MMOL/L (ref 3–11)
BH CV ECHO MEAS - BSA(HAYCOCK): 1.5 M^2
BH CV ECHO MEAS - BSA: 1.5 M^2
BH CV ECHO MEAS - BZI_BMI: 19.1 KILOGRAMS/M^2
BH CV ECHO MEAS - BZI_METRIC_HEIGHT: 160 CM
BH CV ECHO MEAS - BZI_METRIC_WEIGHT: 49 KG
BH CV XLRA MEAS LEFT CCA RATIO VEL: 74.2 CM/SEC
BH CV XLRA MEAS LEFT DIST CCA EDV: 13.3 CM/SEC
BH CV XLRA MEAS LEFT DIST CCA PSV: 61.9 CM/SEC
BH CV XLRA MEAS LEFT DIST ICA EDV: 82.2 CM/SEC
BH CV XLRA MEAS LEFT DIST ICA PSV: 297 CM/SEC
BH CV XLRA MEAS LEFT ICA RATIO VEL: 515 CM/SEC
BH CV XLRA MEAS LEFT ICA/CCA RATIO: 6.9
BH CV XLRA MEAS LEFT MID CCA EDV: 16.6 CM/SEC
BH CV XLRA MEAS LEFT MID CCA PSV: 74.2 CM/SEC
BH CV XLRA MEAS LEFT MID ICA EDV: 41.1 CM/SEC
BH CV XLRA MEAS LEFT MID ICA PSV: 184 CM/SEC
BH CV XLRA MEAS LEFT PROX CCA EDV: 19.2 CM/SEC
BH CV XLRA MEAS LEFT PROX CCA PSV: 88.2 CM/SEC
BH CV XLRA MEAS LEFT PROX ECA PSV: 268 CM/SEC
BH CV XLRA MEAS LEFT PROX ICA EDV: 132 CM/SEC
BH CV XLRA MEAS LEFT PROX ICA PSV: 515 CM/SEC
BH CV XLRA MEAS LEFT PROX SCLA PSV: 147 CM/SEC
BH CV XLRA MEAS LEFT VERTEBRAL A EDV: 29.5 CM/SEC
BH CV XLRA MEAS LEFT VERTEBRAL A PSV: 82.5 CM/SEC
BH CV XLRA MEAS RIGHT CCA RATIO VEL: 89.6 CM/SEC
BH CV XLRA MEAS RIGHT DIST CCA EDV: 15.7 CM/SEC
BH CV XLRA MEAS RIGHT DIST CCA PSV: 89.6 CM/SEC
BH CV XLRA MEAS RIGHT DIST ICA EDV: 20.1 CM/SEC
BH CV XLRA MEAS RIGHT DIST ICA PSV: 96.8 CM/SEC
BH CV XLRA MEAS RIGHT ICA RATIO VEL: 371 CM/SEC
BH CV XLRA MEAS RIGHT ICA/CCA RATIO: 4.1
BH CV XLRA MEAS RIGHT MID CCA EDV: 22.4 CM/SEC
BH CV XLRA MEAS RIGHT MID CCA PSV: 112 CM/SEC
BH CV XLRA MEAS RIGHT MID ICA EDV: 28.4 CM/SEC
BH CV XLRA MEAS RIGHT MID ICA PSV: 188 CM/SEC
BH CV XLRA MEAS RIGHT PROX CCA EDV: 19.1 CM/SEC
BH CV XLRA MEAS RIGHT PROX CCA PSV: 89.8 CM/SEC
BH CV XLRA MEAS RIGHT PROX ECA EDV: 100 CM/SEC
BH CV XLRA MEAS RIGHT PROX ECA PSV: 316 CM/SEC
BH CV XLRA MEAS RIGHT PROX ICA EDV: 107 CM/SEC
BH CV XLRA MEAS RIGHT PROX ICA PSV: 371 CM/SEC
BH CV XLRA MEAS RIGHT PROX SCLA PSV: 172 CM/SEC
BH CV XLRA MEAS RIGHT VERTEBRAL A EDV: 17.5 CM/SEC
BH CV XLRA MEAS RIGHT VERTEBRAL A PSV: 69 CM/SEC
BUN BLD-MCNC: 18 MG/DL (ref 9–23)
BUN/CREAT SERPL: 18 (ref 7–25)
CALCIUM SPEC-SCNC: 9.4 MG/DL (ref 8.7–10.4)
CHLORIDE SERPL-SCNC: 105 MMOL/L (ref 99–109)
CO2 SERPL-SCNC: 33 MMOL/L (ref 20–31)
CREAT BLD-MCNC: 1 MG/DL (ref 0.6–1.3)
DEPRECATED RDW RBC AUTO: 55.4 FL (ref 37–54)
ERYTHROCYTE [DISTWIDTH] IN BLOOD BY AUTOMATED COUNT: 16.2 % (ref 11.3–14.5)
GFR SERPL CREATININE-BSD FRML MDRD: 53 ML/MIN/1.73
GLUCOSE BLD-MCNC: 98 MG/DL (ref 70–100)
HCT VFR BLD AUTO: 36.3 % (ref 34.5–44)
HGB BLD-MCNC: 10.5 G/DL (ref 11.5–15.5)
MCH RBC QN AUTO: 26.9 PG (ref 27–31)
MCHC RBC AUTO-ENTMCNC: 28.9 G/DL (ref 32–36)
MCV RBC AUTO: 93.1 FL (ref 80–99)
PLATELET # BLD AUTO: 208 10*3/MM3 (ref 150–450)
PMV BLD AUTO: 10.1 FL (ref 6–12)
POTASSIUM BLD-SCNC: 4.3 MMOL/L (ref 3.5–5.5)
RBC # BLD AUTO: 3.9 10*6/MM3 (ref 3.89–5.14)
SODIUM BLD-SCNC: 141 MMOL/L (ref 132–146)
TROPONIN I SERPL-MCNC: 0.07 NG/ML
TSH SERPL DL<=0.05 MIU/L-ACNC: 1.87 MIU/ML (ref 0.35–5.35)
WBC NRBC COR # BLD: 4.96 10*3/MM3 (ref 3.5–10.8)

## 2017-06-03 PROCEDURE — 94799 UNLISTED PULMONARY SVC/PX: CPT

## 2017-06-03 PROCEDURE — 93880 EXTRACRANIAL BILAT STUDY: CPT

## 2017-06-03 PROCEDURE — 84443 ASSAY THYROID STIM HORMONE: CPT | Performed by: PHYSICIAN ASSISTANT

## 2017-06-03 PROCEDURE — G0378 HOSPITAL OBSERVATION PER HR: HCPCS

## 2017-06-03 PROCEDURE — 99225 PR SBSQ OBSERVATION CARE/DAY 25 MINUTES: CPT | Performed by: INTERNAL MEDICINE

## 2017-06-03 PROCEDURE — 93880 EXTRACRANIAL BILAT STUDY: CPT | Performed by: INTERNAL MEDICINE

## 2017-06-03 PROCEDURE — 93005 ELECTROCARDIOGRAM TRACING: CPT | Performed by: PHYSICIAN ASSISTANT

## 2017-06-03 PROCEDURE — 84484 ASSAY OF TROPONIN QUANT: CPT | Performed by: PHYSICIAN ASSISTANT

## 2017-06-03 PROCEDURE — 80048 BASIC METABOLIC PNL TOTAL CA: CPT | Performed by: PHYSICIAN ASSISTANT

## 2017-06-03 PROCEDURE — 93010 ELECTROCARDIOGRAM REPORT: CPT | Performed by: INTERNAL MEDICINE

## 2017-06-03 PROCEDURE — 85027 COMPLETE CBC AUTOMATED: CPT | Performed by: PHYSICIAN ASSISTANT

## 2017-06-03 RX ORDER — NITROGLYCERIN 20 MG/100ML
5-200 INJECTION INTRAVENOUS
Status: DISCONTINUED | OUTPATIENT
Start: 2017-06-03 | End: 2017-06-04

## 2017-06-03 RX ORDER — NITROGLYCERIN 20 MG/100ML
5-200 INJECTION INTRAVENOUS
Status: DISCONTINUED | OUTPATIENT
Start: 2017-06-03 | End: 2017-06-03

## 2017-06-03 RX ADMIN — LISINOPRIL 20 MG: 10 TABLET ORAL at 09:08

## 2017-06-03 RX ADMIN — HYDROCODONE BITARTRATE AND ACETAMINOPHEN 1 TABLET: 7.5; 325 TABLET ORAL at 05:55

## 2017-06-03 RX ADMIN — ATORVASTATIN CALCIUM 80 MG: 40 TABLET, FILM COATED ORAL at 20:10

## 2017-06-03 RX ADMIN — ASPIRIN 81 MG: 81 TABLET, COATED ORAL at 09:08

## 2017-06-03 RX ADMIN — METOPROLOL TARTRATE 25 MG: 25 TABLET ORAL at 09:08

## 2017-06-03 RX ADMIN — AMITRIPTYLINE HYDROCHLORIDE 25 MG: 25 TABLET, FILM COATED ORAL at 20:10

## 2017-06-03 RX ADMIN — CLOPIDOGREL BISULFATE 75 MG: 75 TABLET ORAL at 09:08

## 2017-06-03 RX ADMIN — AMLODIPINE BESYLATE 10 MG: 10 TABLET ORAL at 09:08

## 2017-06-03 RX ADMIN — PANTOPRAZOLE SODIUM 40 MG: 40 TABLET, DELAYED RELEASE ORAL at 05:55

## 2017-06-03 RX ADMIN — HYDROCODONE BITARTRATE AND ACETAMINOPHEN 1 TABLET: 7.5; 325 TABLET ORAL at 19:27

## 2017-06-03 RX ADMIN — ISOSORBIDE MONONITRATE 60 MG: 60 TABLET, EXTENDED RELEASE ORAL at 09:08

## 2017-06-03 NOTE — PLAN OF CARE
Problem: Patient Care Overview (Adult)  Goal: Plan of Care Review  Outcome: Ongoing (interventions implemented as appropriate)    06/03/17 9000   Coping/Psychosocial Response Interventions   Plan Of Care Reviewed With patient   Patient Care Overview   Progress improving   Outcome Evaluation   Outcome Summary/Follow up Plan Has been down stairs for differenrt test, has now got a diet and no longer NPO. Calls out for assistance as needed. Denies any chest pain, has asked if she could go home.       Goal: Adult Individualization and Mutuality  Outcome: Ongoing (interventions implemented as appropriate)    Problem: Pain, Acute (Adult)  Goal: Identify Related Risk Factors and Signs and Symptoms  Outcome: Ongoing (interventions implemented as appropriate)  Goal: Acceptable Pain Control/Comfort Level  Outcome: Ongoing (interventions implemented as appropriate)

## 2017-06-03 NOTE — NURSING NOTE
Patient refusing to get to the bathroom to void at this time. States that she is too tired to get up and will get up when she feels more awake.

## 2017-06-03 NOTE — PLAN OF CARE
Problem: Patient Care Overview (Adult)  Goal: Plan of Care Review  Outcome: Ongoing (interventions implemented as appropriate)  Goal: Adult Individualization and Mutuality  Outcome: Ongoing (interventions implemented as appropriate)    Problem: Pain, Acute (Adult)  Goal: Identify Related Risk Factors and Signs and Symptoms  Outcome: Ongoing (interventions implemented as appropriate)

## 2017-06-03 NOTE — PROGRESS NOTES
"  [facility] at [facility]   Inpatient Progress Note       LOS: 1 day   Patient Care Team:  Gilmar Campos MD as PCP - General    Chief Complaint:  Follow-up for chest pain    Subjective     Interval History:   Chest pain, she notes she feels her pain is different than her previous anginal pain as a sharp and came in \"waves\".  No dyspnea or dizziness.  Notes she had an episode of 2 hours a left arm \"numbness, and inability to move it occurring one week ago.      Review of Systems:   Pertinent positives noted in history, exam, and assessment. Otherwise reviewed and negative.      Objective     Vitals:  Blood pressure 134/63, pulse 66, temperature 97.6 °F (36.4 °C), temperature source Oral, resp. rate 16, height 63\" (160 cm), weight 108 lb 3.2 oz (49.1 kg), SpO2 97 %.     Physical Exam   Constitutional: She is oriented to person, place, and time. She appears well-developed and well-nourished.   HENT:   Mouth/Throat: Oropharynx is clear and moist.   Neck: No JVD present. Carotid bruit is not present. No thyromegaly present.   Cardiovascular: Regular rhythm, S1 normal, S2 normal, normal heart sounds and intact distal pulses.  Exam reveals no gallop, no S3 and no S4.    No murmur heard.  Pulses:       Carotid pulses are 2+ on the right side with bruit, and 2+ on the left side with bruit.       Radial pulses are 2+ on the right side, and 2+ on the left side.   Pulmonary/Chest: Breath sounds normal.   Abdominal: Soft. Bowel sounds are normal. She exhibits no mass. There is no tenderness.   Musculoskeletal: She exhibits no edema.   Neurological: She is alert and oriented to person, place, and time.   Skin: Skin is warm and dry. No rash noted.          Results Review:     I reviewed the patient's new clinical results.      Results from last 7 days  Lab Units 06/03/17  0905   WBC 10*3/mm3 4.96   HEMOGLOBIN g/dL 10.5*   HEMATOCRIT % 36.3   PLATELETS 10*3/mm3 208       Results from last 7 days  Lab Units " 06/03/17  0904 06/02/17  1320   SODIUM mmol/L 141 140   POTASSIUM mmol/L 4.3 3.4*   CHLORIDE mmol/L 105 105   TOTAL CO2 mmol/L 33.0* 33.0*   BUN mg/dL 18 15   CREATININE mg/dL 1.00 0.80   CALCIUM mg/dL 9.4 9.8   BILIRUBIN mg/dL  --  0.3   ALK PHOS U/L  --  69   ALT (SGPT) U/L  --  5*   AST (SGOT) U/L  --  10   GLUCOSE mg/dL 98 189*       Results from last 7 days  Lab Units 06/03/17  0904   SODIUM mmol/L 141   POTASSIUM mmol/L 4.3   CHLORIDE mmol/L 105   TOTAL CO2 mmol/L 33.0*   BUN mg/dL 18   CREATININE mg/dL 1.00   GLUCOSE mg/dL 98   CALCIUM mg/dL 9.4           0  Lab Value Date/Time   TROPONINI 0.065 (H) 06/03/2017 0904   TROPONINI 0.069 (H) 06/02/2017 1947   TROPONINI 73.409 (C) 08/02/2016 0538       Results from last 7 days  Lab Units 06/03/17  0904   TSH mIU/mL 1.873       Results from last 7 days  Lab Units 06/02/17  1320   CHOLESTEROL mg/dL 212*   TRIGLYCERIDES mg/dL 144   HDL CHOL mg/dL 56             Tele:  SR    Assessment/Plan     Active Problems:    Unstable angina      1. Chest pain  2. CAD with previous CABG  3. Hypertension  4. Dyslipidemia  5. Diabetes mellitus  6. COPD with chronic oxygen  7. Cerebrovascular disease with known abnormal carotid duplex, now with what appears to be a TIA.    Plan:  Recheck carotid duplex.,  Get neurovascular involvement for possible vascularization.  Given normal enzymes and EKG will not proceed to cardiac revascularization at this time.  Samantha Walsh, MARIBEL  06/03/17  11:01 AM  I, Jaxson Kramer have reviewed the note in full and agree with all aspects of the above including physical exam, assessment, labs and plan with changes made accordingly.     Jaxson Kramer MD  06/03/17  11:17 AM        Dictated utilizing Dragon dictation

## 2017-06-04 ENCOUNTER — APPOINTMENT (OUTPATIENT)
Dept: MRI IMAGING | Facility: HOSPITAL | Age: 82
End: 2017-06-04

## 2017-06-04 LAB
ANION GAP SERPL CALCULATED.3IONS-SCNC: 3 MMOL/L (ref 3–11)
BUN BLD-MCNC: 25 MG/DL (ref 9–23)
BUN/CREAT SERPL: 17.9 (ref 7–25)
CALCIUM SPEC-SCNC: 9.2 MG/DL (ref 8.7–10.4)
CHLORIDE SERPL-SCNC: 104 MMOL/L (ref 99–109)
CO2 SERPL-SCNC: 33 MMOL/L (ref 20–31)
CREAT BLD-MCNC: 1.4 MG/DL (ref 0.6–1.3)
DEPRECATED RDW RBC AUTO: 55.6 FL (ref 37–54)
ERYTHROCYTE [DISTWIDTH] IN BLOOD BY AUTOMATED COUNT: 16.2 % (ref 11.3–14.5)
GFR SERPL CREATININE-BSD FRML MDRD: 36 ML/MIN/1.73
GLUCOSE BLD-MCNC: 82 MG/DL (ref 70–100)
HCT VFR BLD AUTO: 35.4 % (ref 34.5–44)
HGB BLD-MCNC: 10 G/DL (ref 11.5–15.5)
MCH RBC QN AUTO: 26.4 PG (ref 27–31)
MCHC RBC AUTO-ENTMCNC: 28.2 G/DL (ref 32–36)
MCV RBC AUTO: 93.4 FL (ref 80–99)
PLATELET # BLD AUTO: 192 10*3/MM3 (ref 150–450)
PMV BLD AUTO: 9.6 FL (ref 6–12)
POTASSIUM BLD-SCNC: 4.6 MMOL/L (ref 3.5–5.5)
RBC # BLD AUTO: 3.79 10*6/MM3 (ref 3.89–5.14)
SODIUM BLD-SCNC: 140 MMOL/L (ref 132–146)
WBC NRBC COR # BLD: 5.39 10*3/MM3 (ref 3.5–10.8)

## 2017-06-04 PROCEDURE — 25810000003 SODIUM CHLORIDE 0.9 % WITH KCL 20 MEQ 20-0.9 MEQ/L-% SOLUTION: Performed by: INTERNAL MEDICINE

## 2017-06-04 PROCEDURE — G0378 HOSPITAL OBSERVATION PER HR: HCPCS

## 2017-06-04 PROCEDURE — 85027 COMPLETE CBC AUTOMATED: CPT | Performed by: INTERNAL MEDICINE

## 2017-06-04 PROCEDURE — 99225 PR SBSQ OBSERVATION CARE/DAY 25 MINUTES: CPT | Performed by: INTERNAL MEDICINE

## 2017-06-04 PROCEDURE — 70553 MRI BRAIN STEM W/O & W/DYE: CPT

## 2017-06-04 PROCEDURE — 80048 BASIC METABOLIC PNL TOTAL CA: CPT | Performed by: INTERNAL MEDICINE

## 2017-06-04 PROCEDURE — A9577 INJ MULTIHANCE: HCPCS | Performed by: INTERNAL MEDICINE

## 2017-06-04 PROCEDURE — 0 GADOBENATE DIMEGLUMINE 529 MG/ML SOLUTION: Performed by: INTERNAL MEDICINE

## 2017-06-04 RX ORDER — SODIUM CHLORIDE AND POTASSIUM CHLORIDE 150; 900 MG/100ML; MG/100ML
50 INJECTION, SOLUTION INTRAVENOUS CONTINUOUS
Status: ACTIVE | OUTPATIENT
Start: 2017-06-04 | End: 2017-06-04

## 2017-06-04 RX ORDER — LORAZEPAM 0.5 MG/1
0.5 TABLET ORAL ONCE AS NEEDED
Status: COMPLETED | OUTPATIENT
Start: 2017-06-04 | End: 2017-06-04

## 2017-06-04 RX ADMIN — POTASSIUM CHLORIDE AND SODIUM CHLORIDE 50 ML/HR: 900; 150 INJECTION, SOLUTION INTRAVENOUS at 13:31

## 2017-06-04 RX ADMIN — ASPIRIN 81 MG: 81 TABLET, COATED ORAL at 08:19

## 2017-06-04 RX ADMIN — LISINOPRIL 20 MG: 10 TABLET ORAL at 08:20

## 2017-06-04 RX ADMIN — METOPROLOL TARTRATE 25 MG: 25 TABLET ORAL at 08:19

## 2017-06-04 RX ADMIN — ISOSORBIDE MONONITRATE 60 MG: 60 TABLET, EXTENDED RELEASE ORAL at 08:19

## 2017-06-04 RX ADMIN — HYDROCODONE BITARTRATE AND ACETAMINOPHEN 1 TABLET: 7.5; 325 TABLET ORAL at 19:30

## 2017-06-04 RX ADMIN — AMITRIPTYLINE HYDROCHLORIDE 25 MG: 25 TABLET, FILM COATED ORAL at 20:44

## 2017-06-04 RX ADMIN — PANTOPRAZOLE SODIUM 40 MG: 40 TABLET, DELAYED RELEASE ORAL at 05:18

## 2017-06-04 RX ADMIN — AMLODIPINE BESYLATE 10 MG: 10 TABLET ORAL at 08:20

## 2017-06-04 RX ADMIN — LORAZEPAM 0.5 MG: 0.5 TABLET ORAL at 19:30

## 2017-06-04 RX ADMIN — ATORVASTATIN CALCIUM 80 MG: 40 TABLET, FILM COATED ORAL at 20:44

## 2017-06-04 RX ADMIN — CLOPIDOGREL BISULFATE 75 MG: 75 TABLET ORAL at 08:19

## 2017-06-04 RX ADMIN — GADOBENATE DIMEGLUMINE 9 ML: 529 INJECTION, SOLUTION INTRAVENOUS at 21:00

## 2017-06-04 NOTE — PLAN OF CARE
Problem: Patient Care Overview (Adult)  Goal: Plan of Care Review  Outcome: Ongoing (interventions implemented as appropriate)    06/04/17 0418   Coping/Psychosocial Response Interventions   Plan Of Care Reviewed With patient   Patient Care Overview   Progress no change   Outcome Evaluation   Outcome Summary/Follow up Plan VSS, 3L NC, Sinus rufino on monitor with ST depression. Did complain of chest pain at beginning of shift; PRN meds given. No other chest pain noted. Nitro drip ordered but on hold due to no complaints of chest pain. Attempted MRI last night; see nurses note. Has been resting comfortably.          Problem: Pain, Acute (Adult)  Goal: Identify Related Risk Factors and Signs and Symptoms  Outcome: Outcome(s) achieved Date Met:  06/04/17  Goal: Acceptable Pain Control/Comfort Level  Outcome: Ongoing (interventions implemented as appropriate)    Problem: Acute Coronary Syndrome (ACS) (Adult)  Goal: Signs and Symptoms of Listed Potential Problems Will be Absent or Manageable (Acute Coronary Syndrome)  Outcome: Ongoing (interventions implemented as appropriate)

## 2017-06-04 NOTE — SIGNIFICANT NOTE
"MRI of brain was ordered with and without contrast. Attempted to take pt to have MRI. MRI called and said pt \"voiced that she was unable to lay flat due to her breathing\". Will attempt MRI again in AM.   "

## 2017-06-04 NOTE — PROGRESS NOTES
"  [facility] at [facility]   Inpatient Progress Note       LOS: 2 days   Patient Care Team:  Gilmar Campos MD as PCP - General    Chief Complaint:  Follow-up for chest pain    Subjective     Interval History:   MRI attempted earlier this morning, however patient unable to lie flat.  Carotid duplex performed yesterday which showed bilateral greater than 70% stenosis.  Patient notes that she was unable to tolerate the MRI secondary to anxiety and unable to tolerate things \"over my face\".  Thinks in the past she has been given some medication for sedation/relaxation.  Is willing to try this.  No further chest pain.  Has not needed any further IV nitroglycerin.  Has been ambulating back and forth to the bathroom without any symptoms.  Did not do MRI due to claustrophobia    Review of Systems:   Pertinent positives noted in history, exam, and assessment. Otherwise reviewed and negative.      Objective     Vitals:  Blood pressure 122/61, pulse 78, temperature 98 °F (36.7 °C), temperature source Oral, resp. rate 16, height 63\" (160 cm), weight 108 lb 3.2 oz (49.1 kg), SpO2 92 %, not currently breastfeeding.     Physical Exam   Constitutional: She is oriented to person, place, and time. She appears well-developed and well-nourished.   HENT:   Mouth/Throat: Oropharynx is clear and moist.   Neck: No JVD present. Carotid bruit is not present.   Cardiovascular: Regular rhythm, S1 normal, S2 normal, normal heart sounds and intact distal pulses.    Pulses:       Carotid pulses are 2+ on the right side with bruit, and 2+ on the left side with bruit.  Pulmonary/Chest: Effort normal and breath sounds normal. No respiratory distress.   Abdominal: Soft. Bowel sounds are normal. There is no tenderness.   Musculoskeletal: She exhibits no edema.   Neurological: She is alert and oriented to person, place, and time.   Skin: Skin is warm and dry.     I examined the patient and agree with the above findings     Results Review: " "    I reviewed the patient's new clinical results.      Results from last 7 days  Lab Units 06/04/17  0623   WBC 10*3/mm3 5.39   HEMOGLOBIN g/dL 10.0*   HEMATOCRIT % 35.4   PLATELETS 10*3/mm3 192       Results from last 7 days  Lab Units 06/04/17  0623  06/02/17  1320   SODIUM mmol/L 140  < > 140   POTASSIUM mmol/L 4.6  < > 3.4*   CHLORIDE mmol/L 104  < > 105   TOTAL CO2 mmol/L 33.0*  < > 33.0*   BUN mg/dL 25*  < > 15   CREATININE mg/dL 1.40*  < > 0.80   CALCIUM mg/dL 9.2  < > 9.8   BILIRUBIN mg/dL  --   --  0.3   ALK PHOS U/L  --   --  69   ALT (SGPT) U/L  --   --  5*   AST (SGOT) U/L  --   --  10   GLUCOSE mg/dL 82  < > 189*   < > = values in this interval not displayed.    Results from last 7 days  Lab Units 06/04/17  0623   SODIUM mmol/L 140   POTASSIUM mmol/L 4.6   CHLORIDE mmol/L 104   TOTAL CO2 mmol/L 33.0*   BUN mg/dL 25*   CREATININE mg/dL 1.40*   GLUCOSE mg/dL 82   CALCIUM mg/dL 9.2           0  Lab Value Date/Time   TROPONINI 0.065 (H) 06/03/2017 0904   TROPONINI 0.069 (H) 06/02/2017 1947   TROPONINI 73.409 (C) 08/02/2016 0538       Results from last 7 days  Lab Units 06/03/17  0904   TSH mIU/mL 1.873       Results from last 7 days  Lab Units 06/02/17  1320   CHOLESTEROL mg/dL 212*   TRIGLYCERIDES mg/dL 144   HDL CHOL mg/dL 56             Tele:  SR    Assessment/Plan     Active Problems:    Unstable angina      1. Chest pain  2. CAD with previous CABG  3. Hypertension  4. Dyslipidemia  5. Diabetes mellitus  6. COPD with chronic oxygen  7. Cerebrovascular disease With severe bilateral ICA stenoses, now with what appears to be a TIA.  8. Bradycardia, heart rate in 80s with relatively low blood pressure.  Given her severe cerebrovascular disease, we will discontinue her beta blocker to lower heart rate and blood pressure running \"a little high\".    Plan:  MRI was not performed yesterday, we'll get done today.  Discussed with Dr. castellanos and we'll see her tomorrow to consider need for carotid intervention.  If " angiography performed, given recurrent chest pain we'll consider coronary angiography at same time.      Samantha Walsh, MARIBEL  06/04/17  11:10 AM    I, Jaxson Kramer have reviewed the note in full and agree with all aspects of the above including physical exam, assessment, labs and plan with changes made accordingly.     Jaxson Kramer MD  06/04/17  12:09 PM          Dictated utilizing Dragon dictation

## 2017-06-05 VITALS
RESPIRATION RATE: 16 BRPM | WEIGHT: 108.2 LBS | BODY MASS INDEX: 19.17 KG/M2 | OXYGEN SATURATION: 96 % | DIASTOLIC BLOOD PRESSURE: 62 MMHG | HEIGHT: 63 IN | TEMPERATURE: 98.1 F | SYSTOLIC BLOOD PRESSURE: 146 MMHG | HEART RATE: 78 BPM

## 2017-06-05 LAB
ANION GAP SERPL CALCULATED.3IONS-SCNC: -1 MMOL/L (ref 3–11)
BUN BLD-MCNC: 26 MG/DL (ref 9–23)
BUN/CREAT SERPL: 28.9 (ref 7–25)
CALCIUM SPEC-SCNC: 9.4 MG/DL (ref 8.7–10.4)
CHLORIDE SERPL-SCNC: 107 MMOL/L (ref 99–109)
CO2 SERPL-SCNC: 36 MMOL/L (ref 20–31)
CREAT BLD-MCNC: 0.9 MG/DL (ref 0.6–1.3)
DEPRECATED RDW RBC AUTO: 51.8 FL (ref 37–54)
ERYTHROCYTE [DISTWIDTH] IN BLOOD BY AUTOMATED COUNT: 15.6 % (ref 11.3–14.5)
GFR SERPL CREATININE-BSD FRML MDRD: 60 ML/MIN/1.73
GLUCOSE BLD-MCNC: 103 MG/DL (ref 70–100)
HCT VFR BLD AUTO: 35.6 % (ref 34.5–44)
HGB BLD-MCNC: 10.2 G/DL (ref 11.5–15.5)
MCH RBC QN AUTO: 26 PG (ref 27–31)
MCHC RBC AUTO-ENTMCNC: 28.7 G/DL (ref 32–36)
MCV RBC AUTO: 90.8 FL (ref 80–99)
PLATELET # BLD AUTO: 203 10*3/MM3 (ref 150–450)
PMV BLD AUTO: 10 FL (ref 6–12)
POTASSIUM BLD-SCNC: 4.5 MMOL/L (ref 3.5–5.5)
RBC # BLD AUTO: 3.92 10*6/MM3 (ref 3.89–5.14)
SODIUM BLD-SCNC: 142 MMOL/L (ref 132–146)
WBC NRBC COR # BLD: 4.84 10*3/MM3 (ref 3.5–10.8)

## 2017-06-05 PROCEDURE — 99225 PR SBSQ OBSERVATION CARE/DAY 25 MINUTES: CPT | Performed by: INTERNAL MEDICINE

## 2017-06-05 PROCEDURE — 80048 BASIC METABOLIC PNL TOTAL CA: CPT | Performed by: INTERNAL MEDICINE

## 2017-06-05 PROCEDURE — 85027 COMPLETE CBC AUTOMATED: CPT | Performed by: INTERNAL MEDICINE

## 2017-06-05 PROCEDURE — 99218 PR INITIAL OBSERVATION CARE/DAY 30 MINUTES: CPT | Performed by: RADIOLOGY

## 2017-06-05 PROCEDURE — G0378 HOSPITAL OBSERVATION PER HR: HCPCS

## 2017-06-05 RX ADMIN — ASPIRIN 81 MG: 81 TABLET, COATED ORAL at 08:00

## 2017-06-05 RX ADMIN — PANTOPRAZOLE SODIUM 40 MG: 40 TABLET, DELAYED RELEASE ORAL at 05:55

## 2017-06-05 RX ADMIN — AMLODIPINE BESYLATE 10 MG: 10 TABLET ORAL at 08:00

## 2017-06-05 RX ADMIN — CLOPIDOGREL BISULFATE 75 MG: 75 TABLET ORAL at 08:00

## 2017-06-05 RX ADMIN — ISOSORBIDE MONONITRATE 60 MG: 60 TABLET, EXTENDED RELEASE ORAL at 08:00

## 2017-06-05 RX ADMIN — HYDROCODONE BITARTRATE AND ACETAMINOPHEN 1 TABLET: 7.5; 325 TABLET ORAL at 03:45

## 2017-06-05 NOTE — PLAN OF CARE
Problem: Patient Care Overview (Adult)  Goal: Plan of Care Review  Outcome: Ongoing (interventions implemented as appropriate)  Patient is discharging home today. No needs voiced at this time   Goal: Adult Individualization and Mutuality  Outcome: Ongoing (interventions implemented as appropriate)  Goal: Discharge Needs Assessment  Outcome: Ongoing (interventions implemented as appropriate)    Problem: Pain, Acute (Adult)  Goal: Acceptable Pain Control/Comfort Level  Outcome: Ongoing (interventions implemented as appropriate)    Problem: Acute Coronary Syndrome (ACS) (Adult)  Goal: Signs and Symptoms of Listed Potential Problems Will be Absent or Manageable (Acute Coronary Syndrome)  Outcome: Ongoing (interventions implemented as appropriate)

## 2017-06-05 NOTE — PLAN OF CARE
Problem: Patient Care Overview (Adult)  Goal: Plan of Care Review  Outcome: Ongoing (interventions implemented as appropriate)    06/05/17 0459   Coping/Psychosocial Response Interventions   Plan Of Care Reviewed With patient   Patient Care Overview   Progress improving   Outcome Evaluation   Outcome Summary/Follow up Plan pt with no c/o CP, taking pain meds as needed, ready to go home       Goal: Adult Individualization and Mutuality  Outcome: Ongoing (interventions implemented as appropriate)    Problem: Pain, Acute (Adult)  Goal: Acceptable Pain Control/Comfort Level  Outcome: Ongoing (interventions implemented as appropriate)    Problem: Acute Coronary Syndrome (ACS) (Adult)  Goal: Signs and Symptoms of Listed Potential Problems Will be Absent or Manageable (Acute Coronary Syndrome)  Outcome: Ongoing (interventions implemented as appropriate)

## 2017-06-05 NOTE — PROGRESS NOTES
Discharge Planning Assessment  [facility]     Patient Name: Angela Berkowitz  MRN: [id number]  Today's Date: 6/5/2017    Admit Date: 6/2/2017          Discharge Needs Assessment       06/05/17 1224    Living Environment    Lives With child(mari), adult    Living Arrangements apartment    Home Accessibility no concerns    Type of Financial/Environmental Concern unable to afford medication(s)   States there are some medications she does not take because of the high copay but she is able to afford her inhalers    Transportation Available car;family or friend will provide    Living Environment    Provides Primary Care For no one, unable/limited ability to care for self    Quality Of Family Relationships supportive;involved    Able to Return to Prior Living Arrangements yes    Discharge Needs Assessment    Concerns To Be Addressed discharge planning concerns    Readmission Within The Last 30 Days no previous admission in last 30 days    Equipment Currently Used at Home cane, straight;walker, rolling;oxygen   Oxygen supplied by [facility]    Discharge Disposition still a patient    Discharge Contact Information if Applicable Solitario Mortensen, son, [phone]            Discharge Plan       06/05/17 1226    Case Management/Social Work Plan    Plan Home    Patient/Family In Agreement With Plan yes    Additional Comments Met with patient in the room to initiate discharge planning. Patient lives with her son in a handicap-accessible apartment in [facility]. She is independent with ADLs and uses a rolling walker when ambulating. She wears 4L home oxygen continuously. Her goal is home at discharge, and she does not anticipate any needs at this time. CM will continue to follow.         Discharge Placement     No information found        Expected Discharge Date and Time     Expected Discharge Date Expected Discharge Time    Jun 5, 2017               Demographic Summary       06/05/17 1222    Referral Information    Referral Source  admission list    Reason For Consult discharge planning    Record Reviewed history and physical    Contact Information    Permission Granted to Share Information With ;family/designee   Solitario Mortensen, son    Primary Care Physician Information    Name Gilmar Campos            Functional Status       06/05/17 1223    Functional Status Prior    Ambulation 1-->assistive equipment    Transferring 0-->independent    Toileting 0-->independent    Dressing 0-->independent    Eating 0-->independent    Communication 0-->understands/communicates without difficulty    Swallowing 0-->swallows foods/liquids without difficulty    Employment/Financial    Employment/Finance Comments Medical and rx coverage through [employer]; uses [employer] mail order or [facility] in [facility]     None            Abuse/Neglect     None            Legal     None            Substance Abuse     None            Patient Forms     None          Joan Galindo

## 2017-06-05 NOTE — PROGRESS NOTES
"  [facility] at [facility]   Inpatient Progress Note       LOS: 3 days   Patient Care Team:  Gilmar Campos MD as PCP - General    Chief Complaint:  Follow-up for chest pain    Subjective     Interval History:   It was good today.  No dizziness.  No further chest pain greater than 24 hours.    Review of Systems:   Pertinent positives noted in history, exam, and assessment. Otherwise reviewed and negative.      Objective     Vitals:  Blood pressure 146/62, pulse 60, temperature 97.9 °F (36.6 °C), temperature source Oral, resp. rate 16, height 63\" (160 cm), weight 108 lb 3.2 oz (49.1 kg), SpO2 100 %, not currently breastfeeding.     Physical Exam   Constitutional: She is oriented to person, place, and time. She appears well-developed and well-nourished.   HENT:   Mouth/Throat: Oropharynx is clear and moist.   Neck: No JVD present. Carotid bruit is not present.   Cardiovascular: Regular rhythm, S1 normal, S2 normal, normal heart sounds and intact distal pulses.    Pulses:       Carotid pulses are 2+ on the right side with bruit, and 2+ on the left side with bruit.  Pulmonary/Chest: Effort normal and breath sounds normal. No respiratory distress.   Abdominal: Soft. Bowel sounds are normal. There is no tenderness.   Musculoskeletal: She exhibits no edema.   Neurological: She is alert and oriented to person, place, and time.   Skin: Skin is warm and dry.     Examined the patient and agree with the above findings     Results Review:     I reviewed the patient's new clinical results.      Results from last 7 days  Lab Units 06/05/17  0659   WBC 10*3/mm3 4.84   HEMOGLOBIN g/dL 10.2*   HEMATOCRIT % 35.6   PLATELETS 10*3/mm3 203       Results from last 7 days  Lab Units 06/05/17  0659  06/02/17  1320   SODIUM mmol/L 142  < > 140   POTASSIUM mmol/L 4.5  < > 3.4*   CHLORIDE mmol/L 107  < > 105   TOTAL CO2 mmol/L 36.0*  < > 33.0*   BUN mg/dL 26*  < > 15   CREATININE mg/dL 0.90  < > 0.80   CALCIUM mg/dL 9.4  < > 9.8 " "  BILIRUBIN mg/dL  --   --  0.3   ALK PHOS U/L  --   --  69   ALT (SGPT) U/L  --   --  5*   AST (SGOT) U/L  --   --  10   GLUCOSE mg/dL 103*  < > 189*   < > = values in this interval not displayed.    Results from last 7 days  Lab Units 06/05/17  0659   SODIUM mmol/L 142   POTASSIUM mmol/L 4.5   CHLORIDE mmol/L 107   TOTAL CO2 mmol/L 36.0*   BUN mg/dL 26*   CREATININE mg/dL 0.90   GLUCOSE mg/dL 103*   CALCIUM mg/dL 9.4           0  Lab Value Date/Time   TROPONINI 0.065 (H) 06/03/2017 0904   TROPONINI 0.069 (H) 06/02/2017 1947   TROPONINI 73.409 (C) 08/02/2016 0538       Results from last 7 days  Lab Units 06/03/17  0904   TSH mIU/mL 1.873       Results from last 7 days  Lab Units 06/02/17  1320   CHOLESTEROL mg/dL 212*   TRIGLYCERIDES mg/dL 144   HDL CHOL mg/dL 56             Tele:  SR    Assessment/Plan     Active Problems:    Unstable angina      1. Chest pain  2. CAD with previous CABG  3. Hypertension  4. Dyslipidemia  5. Diabetes mellitus  6. COPD with chronic oxygen  7. Cerebrovascular disease   - With severe bilateral ICA stenoses, now with what appears to be a TIA.  - MRI report still pending  8. Bradycardia, heart rate in 80s with relatively low blood pressure.  Given her severe cerebrovascular disease, we will discontinue her beta blocker to lower heart rate and blood pressure running \"a little high\".    Plan:  MRI interpretation pending.  No interventional consult today to evaluate bilateral ICA stenosis which appear to be symptomatic.  Blood pressure is better today, she feels overall better with a higher blood pressure.  If she gets cerebral angiograms, will do cardiac catheter at same time.      MARIBEL Tucker  06/05/17  8:41 AM    IJaxson have reviewed the note in full and agree with all aspects of the above including physical exam, assessment, labs and plan with changes made accordingly.     MARIBEL Tucker  06/05/17  8:41 AM    Jaxson SEO have reviewed the note in " full and agree with all aspects of the above including physical exam, assessment, labs and plan with changes made accordingly.     Jaxson Kramer MD  06/05/17  9:03 AM            Dictated utilizing Dragon dictation

## 2017-06-05 NOTE — CONSULTS
"NAME: VALERIA MORALES  : 1935  PCP: Gilmar Campos MD  Attending MD: Marco A Bedolla MD    Date of Admission:  2017  Date of Service: 2017    History of Present Illness:  82 y.o.  female history of coronary artery disease, CHF, COPD, and diabetes who presented to the emergency department on 2017 with substernal chest pain and shortness of air.  She had a cardiac workup which was essentially \"unremarkable\", and did not require intervention.  As part of her workup/evaluation, she did undergo carotid duplex demonstrating abnormal velocities consistent with high-grade bilateral carotid stenoses.    I had a lengthy discussion with Ms. Morales on the morning of 2017, and she denies any symptoms of unilateral weakness or numbness, no speech or vision changes.  There is a question of some transient left arm \"numbness\" a few weeks ago, but she denies that by my questioning today.  I'm seeing her in regards to consultation regarding her carotid artery disease.    Past Medical History:  Past Medical History:   Diagnosis Date   • Back pain    • CAD (coronary artery disease)    • CHF (congestive heart failure)    • Chronic low back pain    • COPD (chronic obstructive pulmonary disease)    • Dependence on supplemental oxygen    • Diverticulosis    • DM2 (diabetes mellitus, type 2)    • DEA (generalized anxiety disorder)    • GERD (gastroesophageal reflux disease)    • History of cancer of uterus     status post hysterectomy   • History of myocardial infarction    • History of stroke    • HLD (hyperlipidemia)    • Hypertension    • Osteoarthritis of lumbar spine    • Pernicious anemia    • Unsteady gait        Past Surgical History:  Past Surgical History:   Procedure Laterality Date   • APPENDECTOMY     • CARDIAC CATHETERIZATION N/A 2016    Procedure: Left Heart Cath;  Surgeon: Anupam Bernal MD;  Location: [facility] CATH INVASIVE LOCATION;  Service:    • CHOLECYSTECTOMY     • COLONOSCOPY  2015   • " CORONARY ARTERY BYPASS GRAFT  1997    x3 in 1997   • CORONARY STENT PLACEMENT  08/2016   • TOTAL ABDOMINAL HYSTERECTOMY      with removal of both ovaries   • UPPER GASTROINTESTINAL ENDOSCOPY  09/2015         Medications  Prescriptions Prior to Admission   Medication Sig Dispense Refill Last Dose   • aclidinium bromide (TUDORZA PRESSAIR) 400 MCG/ACT aerosol powder  powder for inhalation Inhale 1 puff Daily. 1 each 5    • albuterol (PROAIR HFA) 108 (90 BASE) MCG/ACT inhaler 1-2 puffs Every 4 to 6 hours as needed 1 inhaler 5    • amitriptyline (ELAVIL) 10 MG tablet Take 1 tablet by mouth every night. 30 tablet 2 Taking   • amLODIPine (NORVASC) 10 MG tablet Take 1 tablet by mouth Daily. 30 tablet 2    • aspirin 325 MG tablet Take 325 mg by mouth Daily.      • atenolol (TENORMIN) 50 MG tablet Take 50 mg by mouth Daily.      • atorvastatin (LIPITOR) 80 MG tablet TAKE 1 TABLET EVERY NIGHT AT BEDTIME 90 tablet 0    • clopidogrel (PLAVIX) 75 MG tablet Take 75 mg by mouth every night.   Taking   • esomeprazole (NexIUM) 40 MG capsule Take 40 mg by mouth daily.   Taking   • fexofenadine (ALLEGRA ALLERGY) 180 MG tablet Take 180 mg by mouth daily.   Taking   • fluticasone-salmeterol (ADVAIR DISKUS) 500-50 MCG/DOSE DISKUS Inhale 1 puff 2 (Two) Times a Day. 60 each 5    • furosemide (LASIX) 20 MG tablet Take 1 tablet by mouth 2 (Two) Times a Day. 7 tablet 0    • gabapentin (NEURONTIN) 600 MG tablet TAKE 1 TABLET THREE TIMES A  tablet 0    • HYDROcodone-acetaminophen (NORCO) 7.5-325 MG per tablet Take 1 tablet by mouth Every 8 (Eight) Hours As Needed for severe pain (7-10). (Patient taking differently: Take 1 tablet by mouth Every 6 (Six) Hours As Needed for Severe Pain (7-10).) 90 tablet 0    • isosorbide mononitrate (IMDUR) 60 MG 24 hr tablet Take 60 mg by mouth Daily.      • lisinopril-hydrochlorothiazide (PRINZIDE,ZESTORETIC) 20-25 MG per tablet Take 1 tablet by mouth Daily.      • metFORMIN XR (GLUCOPHAGE-XR) 500 MG 24  hr tablet Take 2 tablets by mouth 2 (Two) Times a Day. 360 tablet 1    • nitroglycerin (NITROSTAT) 0.4 MG SL tablet One tab sublingual prn 10 tablet 3    • raNITIdine (ZANTAC) 150 MG tablet Take 150 mg by mouth 2 (Two) Times a Day As Needed for Heartburn.      • rOPINIRole (REQUIP) 2 MG tablet Take 2 mg by mouth Every Night.      • sucralfate (CARAFATE) 1 G tablet Take 1 tablet by mouth 4 (four) times a day before meals and nightly.   Taking       Allergies:  Allergies   Allergen Reactions   • Pneumovax [Pneumococcal Polysaccharide Vaccine]        Social Hx:  Social History     Social History   • Marital status:      Spouse name: N/A   • Number of children: N/A   • Years of education: N/A     Occupational History   • Not on file.     Social History Main Topics   • Smoking status: Current Every Day Smoker     Packs/day: 0.25   • Smokeless tobacco: Never Used   • Alcohol use No   • Drug use: No   • Sexual activity: Defer     Other Topics Concern   • Not on file     Social History Narrative       Family Hx:  History reviewed. No pertinent family history.    Review of Imaging:  MRI of the head from 6/4/2017 was reviewed along with its corresponding radiologic report.  There are mild chronic small vessel ischemic changes (age-appropriate), but no areas of restricted diffusion or acute infarction.  Typically, there are no acute or chronic-appearing thromboembolic infarcts.    Carotid duplex from 6/3/2017 demonstrates high-grade, bilateral carotid artery stenoses.  Peak velocities within the right carotid vasculature are 371/107 cm/s (ratio 4.1).  Peak velocities within the left carotid vasculature are 515/132 cm/s (ratio 6.9).  In comparison to prior carotid duplex from 8/2/2016, this represents interval progression of disease, with peak velocities on the right previously being 236/60 cm/s (ratio 2.5), and peak velocities on the left previously being 419/109 cm/s (ratio 6.1).       Laboratory Result:  Lab Results  "  Component Value Date    WBC 4.84 06/05/2017    HGB 10.2 (L) 06/05/2017    HCT 35.6 06/05/2017    MCV 90.8 06/05/2017     06/05/2017     Lab Results   Component Value Date    GLUCOSE 103 (H) 06/05/2017    CALCIUM 9.4 06/05/2017     06/05/2017    K 4.5 06/05/2017    CO2 36.0 (H) 06/05/2017     06/05/2017    BUN 26 (H) 06/05/2017    CREATININE 0.90 06/05/2017    EGFRIFAFRI 73 02/17/2016    EGFRIFNONA 60 (L) 06/05/2017    BCR 28.9 (H) 06/05/2017    ANIONGAP -1.0 (L) 06/05/2017     Lab Results   Component Value Date    HGBA1C 5.60 06/02/2017     Lab Results   Component Value Date    CHOL 212 (H) 06/02/2017     Lab Results   Component Value Date    TRIG 144 06/02/2017     Lab Results   Component Value Date    HDL 56 06/02/2017       Physical Examination:  Vitals:    06/05/17 1141   BP:    Pulse: 78   Resp: 16   Temp: 98.1 °F (36.7 °C)   SpO2: 96%        General Appearance:   Well developed, well nourished, well groomed, alert, and cooperative.  Cardiovascular: Regular rate and rhythm. Soft right carotid bruit.    Neurological examination:  Alert and oriented ×3.  Nonfocal neurologic exam.  Speech is clear.  Tongue protrudes to midline.  Symmetrical elevation the palate.  No facial droop or pronator drift.  Extraocular muscles are full.  I cannot elicit any gross visual field deficits.  Antigravity strength in the bilateral lower extremities.  She had relates without assistance.      Diagnoses/Plan:    Ms. Berkowitz is a 82 y.o. female admitted for evaluation of chest pain and shortness of air, with a \"negative\" cardiac workup.  However, she does have carotid duplex demonstrating progression of disease involving the bilateral carotid vasculature, with now evidence of high-grade bilateral carotid stenoses.  By my questioning, she denies any symptoms of unilateral weakness/numbness/tingling, no speech or vision changes.  She appears to have progression of asymptomatic bilateral carotid disease.  MRI of the " head demonstrates no acute infarcts and no evidence of prior thromboembolic event.  I offered her further workup with carotid angiography as an inpatient and possible carotid intervention during this hospitalization.  She very much wishes to go home at this point, and given the asymptomatic nature of her disease, this would not be unreasonable.  I am going to see her back in the office as an outpatient in the next couple of weeks, to discuss revascularization options for her progressive carotid disease.  While the risk of stroke is not 0, I think that on dual antiplatelet regimen/high-dose statin therapy, her risk of stroke in this short interim is low, and thus it is not unreasonable to follow her as an outpatient.

## 2017-06-06 ENCOUNTER — TRANSITIONAL CARE MANAGEMENT TELEPHONE ENCOUNTER (OUTPATIENT)
Dept: FAMILY MEDICINE CLINIC | Facility: CLINIC | Age: 82
End: 2017-06-06

## 2017-06-08 ENCOUNTER — TELEPHONE (OUTPATIENT)
Dept: FAMILY MEDICINE CLINIC | Facility: CLINIC | Age: 82
End: 2017-06-08

## 2017-06-08 NOTE — TELEPHONE ENCOUNTER
----- Message from Rosa Woodward sent at 6/8/2017  2:59 PM EDT -----  Contact: JOSE MANUEL FERRELL CALLING :  THEY WERE GOING TO DO A DISCHARGE WITH PT. THIS WEEK, BUT PATIENT DID NOT ANSWER THE DOOR, SO THEY MISSED THAT VISIT. SO SHE HAS BEEN DISCHARGED.     [phone]

## 2017-06-12 NOTE — DISCHARGE SUMMARY
"Date of Discharge:  6/12/2017  Date of Admit: 6/2/2017    Gilmar Campos MD      Discharge Diagnosis:\  1. Chest pain  2. CAD with previous CABG  3. Hypertension  4. Dyslipidemia  5. Diabetes mellitus  6. COPD with chronic oxygen  7. Cerebrovascular disease, Seen by Dr. Winston.  8. Bradycardia, heart rate in 80s with relatively low blood pressure. Given her severe cerebrovascular disease, we will discontinue her beta blocker to lower heart rate and blood pressure running \"a little high\".    Hospital Course:   Patient is an 82-year-old  female who was admitted on 6/2/17 secondary complaints of chest pain.  She had a noted previous history of coronary bypass grafting.  Her enzymes remained negative.  After 24 hours of observation she did not have any further chest pain, however it was noted that she had bilateral greater than 70% ICA disease.  There was some concern for possible recent TIA event.  She was kept in the hospital for further evaluation.  An MRI of the brain was ordered and noted overall to be normal for age.  She was seen by Dr. winston, who at this time recommended medical therapy, given her MRI results.  On 6/5/17 she was deemed stable from a cardiac standpoint and ready for discharge home.    Procedures Performed none         Results: Dr. Winston  .      Discharge Physical Exam:  /62  Pulse 78  Temp 98.1 °F (36.7 °C) (Oral)   Resp 16  Ht 63\" (160 cm)  Wt 108 lb 3.2 oz (49.1 kg)  SpO2 96%  BMI 19.17 kg/m2    Physical Exam, see progress note from date of discharge      Discharge Medications   Angela Berkowitz   Home Medication Instructions JULIANE:[id number]    Printed on:06/12/17 1028   Medication Information                      aclidinium bromide (TUDORZA PRESSAIR) 400 MCG/ACT aerosol powder  powder for inhalation  Inhale 1 puff Daily.             albuterol (PROAIR HFA) 108 (90 BASE) MCG/ACT inhaler  1-2 puffs Every 4 to 6 hours as needed             amitriptyline (ELAVIL) 10 MG " tablet  Take 1 tablet by mouth every night.             amLODIPine (NORVASC) 10 MG tablet  Take 1 tablet by mouth Daily.             aspirin 325 MG tablet  Take 325 mg by mouth Daily.             atorvastatin (LIPITOR) 80 MG tablet  TAKE 1 TABLET EVERY NIGHT AT BEDTIME             clopidogrel (PLAVIX) 75 MG tablet  Take 75 mg by mouth every night.             esomeprazole (NexIUM) 40 MG capsule  Take 40 mg by mouth daily.             fexofenadine (ALLEGRA ALLERGY) 180 MG tablet  Take 180 mg by mouth daily.             fluticasone-salmeterol (ADVAIR DISKUS) 500-50 MCG/DOSE DISKUS  Inhale 1 puff 2 (Two) Times a Day.             furosemide (LASIX) 20 MG tablet  Take 1 tablet by mouth 2 (Two) Times a Day.             gabapentin (NEURONTIN) 600 MG tablet  TAKE 1 TABLET THREE TIMES A DAY             HYDROcodone-acetaminophen (NORCO) 7.5-325 MG per tablet  Take 1 tablet by mouth Every 8 (Eight) Hours As Needed for severe pain (7-10).             isosorbide mononitrate (IMDUR) 60 MG 24 hr tablet  Take 60 mg by mouth Daily.             lisinopril-hydrochlorothiazide (PRINZIDE,ZESTORETIC) 20-25 MG per tablet  Take 1 tablet by mouth Daily.             metFORMIN XR (GLUCOPHAGE-XR) 500 MG 24 hr tablet  Take 2 tablets by mouth 2 (Two) Times a Day.             nitroglycerin (NITROSTAT) 0.4 MG SL tablet  One tab sublingual prn             raNITIdine (ZANTAC) 150 MG tablet  Take 150 mg by mouth 2 (Two) Times a Day As Needed for Heartburn.             rOPINIRole (REQUIP) 2 MG tablet  Take 2 mg by mouth Every Night.             sucralfate (CARAFATE) 1 G tablet  Take 1 tablet by mouth 4 (four) times a day before meals and nightly.                 Discharge Diet:  cardiac, low-sodium    Activity at Discharge:  as tolerated    Discharge disposition: home    Condition on Discharge: stable    Follow-up Appointments  Future Appointments  Date Time Provider Department Center   6/19/2017 3:00 PM Gilmar D Morrin, MD [facility] None   6/28/2017  2:30 PM MD ELIJAH Saini [facility] None   7/19/2017 1:00 PM MD ELIJAH Calixto [facility] None     Additional Instructions for the Follow-ups that You Need to Schedule     Discharge Follow-up with Specialty    As directed    Specialty:  Dr. Kramer   Follow Up:  1 Month       Discharge Follow-up with Specified Provider    As directed    To:  Zachary Winston   Follow Up:  2 Weeks           Additional information on Labs and Follow-ups:      [facility] will call and confirm your follow appointment                          MARIBEL Tucker  06/12/17  10:28 AM       EMR Dragon/Transcription disclaimer:  Much of this encounter note is an electronic transcription/translation of spoken language to printed text. Electronic translation of spoken language may permit erroneous, or at times, nonsensical words or phrases to be inadvertently transcribed. Although I have reviewed the note for such errors, some may still exist.

## 2017-06-22 DIAGNOSIS — I10 ESSENTIAL HYPERTENSION: ICD-10-CM

## 2017-06-26 RX ORDER — HYDROCHLOROTHIAZIDE 25 MG/1
TABLET ORAL
Qty: 90 TABLET | Refills: 1 | OUTPATIENT
Start: 2017-06-26

## 2017-07-01 DIAGNOSIS — G89.29 CHRONIC BILATERAL LOW BACK PAIN WITH BILATERAL SCIATICA: ICD-10-CM

## 2017-07-01 DIAGNOSIS — M54.41 CHRONIC BILATERAL LOW BACK PAIN WITH BILATERAL SCIATICA: ICD-10-CM

## 2017-07-01 DIAGNOSIS — M54.42 CHRONIC BILATERAL LOW BACK PAIN WITH BILATERAL SCIATICA: ICD-10-CM

## 2017-07-05 DIAGNOSIS — M54.41 CHRONIC BILATERAL LOW BACK PAIN WITH BILATERAL SCIATICA: ICD-10-CM

## 2017-07-05 DIAGNOSIS — M54.42 CHRONIC BILATERAL LOW BACK PAIN WITH BILATERAL SCIATICA: ICD-10-CM

## 2017-07-05 DIAGNOSIS — G89.29 CHRONIC BILATERAL LOW BACK PAIN WITH BILATERAL SCIATICA: ICD-10-CM

## 2017-07-05 RX ORDER — GABAPENTIN 600 MG/1
TABLET ORAL
Qty: 270 TABLET | Refills: 0 | OUTPATIENT
Start: 2017-07-05

## 2017-07-05 RX ORDER — GABAPENTIN 600 MG/1
TABLET ORAL
Qty: 270 TABLET | Refills: 0 | Status: SHIPPED | OUTPATIENT
Start: 2017-07-05 | End: 2017-10-02 | Stop reason: SDUPTHER

## 2017-07-06 ENCOUNTER — TELEPHONE (OUTPATIENT)
Dept: FAMILY MEDICINE CLINIC | Facility: CLINIC | Age: 82
End: 2017-07-06

## 2017-07-06 NOTE — TELEPHONE ENCOUNTER
----- Message from Theresa Arias sent at 7/6/2017 11:02 AM EDT -----  Contact: samira/[facility]  PATIENT IS NEEDING A REFILL ON HER gabapentin (NEURONTIN) 600 MG tablet 3 X A DAY PATIENTS PHARMACY [facility] PATIENT CAN BE REACHED  [phone] PATIENT WAS ADVISED DR RICHARD WAS ON VACATION

## 2017-07-06 NOTE — TELEPHONE ENCOUNTER
SPOKE WITH PT AND INFORMED HER OF THIS AND THAT WE SPOKE ABOUT THIS YESTERDAY ALSO. PT DIDN'T REMEMBER.

## 2017-08-24 ENCOUNTER — TELEPHONE (OUTPATIENT)
Dept: FAMILY MEDICINE CLINIC | Facility: CLINIC | Age: 82
End: 2017-08-24

## 2017-08-24 NOTE — TELEPHONE ENCOUNTER
----- Message from Rosa Woodward sent at 8/24/2017  3:10 PM EDT -----  Contact: JOSE MANUEL  PATIENT REQUESTING A REFILL:    GABAPENTIN 600 MG    [facility] [place]

## 2017-08-25 NOTE — TELEPHONE ENCOUNTER
I believe she is being dismissed as she has DNKAd 4 times and canceled 2 other times in the last year and has not been seen here.

## 2017-08-30 ENCOUNTER — TELEPHONE (OUTPATIENT)
Dept: FAMILY MEDICINE CLINIC | Facility: CLINIC | Age: 82
End: 2017-08-30

## 2017-09-06 ENCOUNTER — TELEPHONE (OUTPATIENT)
Dept: FAMILY MEDICINE CLINIC | Facility: CLINIC | Age: 82
End: 2017-09-06

## 2017-10-02 ENCOUNTER — OFFICE VISIT (OUTPATIENT)
Dept: FAMILY MEDICINE CLINIC | Facility: CLINIC | Age: 82
End: 2017-10-02

## 2017-10-02 VITALS
TEMPERATURE: 98.1 F | HEIGHT: 63 IN | RESPIRATION RATE: 18 BRPM | HEART RATE: 74 BPM | WEIGHT: 107 LBS | SYSTOLIC BLOOD PRESSURE: 152 MMHG | DIASTOLIC BLOOD PRESSURE: 84 MMHG | BODY MASS INDEX: 18.96 KG/M2

## 2017-10-02 DIAGNOSIS — M54.42 CHRONIC BILATERAL LOW BACK PAIN WITH BILATERAL SCIATICA: ICD-10-CM

## 2017-10-02 DIAGNOSIS — M54.41 CHRONIC BILATERAL LOW BACK PAIN WITH BILATERAL SCIATICA: ICD-10-CM

## 2017-10-02 DIAGNOSIS — J43.1 PANLOBULAR EMPHYSEMA (HCC): Primary | ICD-10-CM

## 2017-10-02 DIAGNOSIS — F41.1 GAD (GENERALIZED ANXIETY DISORDER): ICD-10-CM

## 2017-10-02 DIAGNOSIS — G89.29 CHRONIC BILATERAL LOW BACK PAIN WITH BILATERAL SCIATICA: ICD-10-CM

## 2017-10-02 PROCEDURE — 99213 OFFICE O/P EST LOW 20 MIN: CPT | Performed by: FAMILY MEDICINE

## 2017-10-02 RX ORDER — GABAPENTIN 600 MG/1
600 TABLET ORAL 3 TIMES DAILY
Qty: 270 TABLET | Refills: 0 | Status: SHIPPED | OUTPATIENT
Start: 2017-10-02 | End: 2018-01-03 | Stop reason: SDUPTHER

## 2017-10-02 NOTE — PROGRESS NOTES
Subjective   Angela Berkowitz is a 82 y.o. female.     History of Present Illness     She is concerned about jury duty and does not feel with her oxygen dependence that she is not able to serve  She is requesting note to get her out of jury duty due to COPD    The following portions of the patient's history were reviewed and updated as appropriate: allergies, current medications, past family history, past medical history, past social history, past surgical history and problem list.    Review of Systems   Respiratory: Positive for shortness of breath.        Objective   Physical Exam   Constitutional: She appears well-developed and well-nourished. No distress.   Cardiovascular: Normal rate, regular rhythm and normal heart sounds.    Pulmonary/Chest: Effort normal and breath sounds normal. No respiratory distress.   On chronic oxygen   Psychiatric: She has a normal mood and affect. Her behavior is normal.   Nursing note and vitals reviewed.      Assessment/Plan   Angela was seen today for jury duty.    Diagnoses and all orders for this visit:    Panlobular emphysema    DEA (generalized anxiety disorder)    Chronic bilateral low back pain with bilateral sciatica  -     gabapentin (NEURONTIN) 600 MG tablet; Take 1 tablet by mouth 3 (Three) Times a Day.    due to chronic oxygen COPD will write letter requesting they make her exempt from jury duty.    On an aside, pt has missed, canceled, and DNKA times 6 in the last year with our office as well as multiple other offices.  I believe that dismissal steps are ongoing at this time.

## 2017-12-01 ENCOUNTER — TELEPHONE (OUTPATIENT)
Dept: FAMILY MEDICINE CLINIC | Facility: CLINIC | Age: 82
End: 2017-12-01

## 2017-12-01 NOTE — TELEPHONE ENCOUNTER
----- Message from Melvina Winn sent at 12/1/2017  1:40 PM EST -----  Contact: dr negron [facility] IS CALLING TO LET YOU KNOW THAT THE STATUS REPORT SAYS SHE CAN NOT BREATH EVEN THOUGH SHE IS ON O2 AND HER LEG ARE SWELLING.

## 2017-12-15 ENCOUNTER — TELEPHONE (OUTPATIENT)
Dept: FAMILY MEDICINE CLINIC | Facility: CLINIC | Age: 82
End: 2017-12-15

## 2017-12-15 NOTE — TELEPHONE ENCOUNTER
----- Message from Rosa Woodward sent at 12/15/2017 10:37 AM EST -----  Contact: JOSE MANUEL [facility] CALLED TO GIVE A REPORT ON THIS PATIENT:    1. SHE IS NOT TAKING HER :SHE REFUSES TO TAKE  LASIX OR NORVAS AND HER BLOOD PRESSURE /110 AND SHE REFUSED TO GO TO THE HOSPITAL.   2. PLEASE FAX A DO NOT RECITATATE ORDER TO THEIR OFFICE IF WE HAVE ONE.  FAX: [phone]  3. NEED ORDER FOR , COMMUNITY RESOURCES, AND HELP WITH MEDICATION.

## 2017-12-18 ENCOUNTER — TELEPHONE (OUTPATIENT)
Dept: FAMILY MEDICINE CLINIC | Facility: CLINIC | Age: 82
End: 2017-12-18

## 2017-12-18 DIAGNOSIS — M79.661 PAIN AND SWELLING OF RIGHT LOWER LEG: ICD-10-CM

## 2017-12-18 DIAGNOSIS — M79.89 PAIN AND SWELLING OF RIGHT LOWER LEG: ICD-10-CM

## 2017-12-18 NOTE — TELEPHONE ENCOUNTER
----- Message from Amy Quinteros sent at 12/18/2017 10:59 AM EST -----  Contact: Andres Duran from [facility] would like to speak with a nurse regarding patient. Please call at [phone].

## 2017-12-18 NOTE — TELEPHONE ENCOUNTER
Spoke with HH pt is out of most of her meds. I advised she will not keep appts. Do you want to refill any meds at this time. They did say they will prob be discharging her early due to noncompliance but would try to work with her for the next few weeks. Her b/p is running very high and she has no meds, advised last week to go to e/r however pt did not.

## 2017-12-22 NOTE — TELEPHONE ENCOUNTER
Ok for verbal order for social and CR.  I will try to call later to talk to her about medications.    I don't think we have a DNR order, at least I could not find one.  I would discuss this with pt and her son to confirm their desires.

## 2017-12-26 RX ORDER — FUROSEMIDE 20 MG/1
20 TABLET ORAL DAILY PRN
Qty: 30 TABLET | Refills: 0 | Status: SHIPPED | OUTPATIENT
Start: 2017-12-26 | End: 2019-01-01

## 2017-12-26 RX ORDER — LISINOPRIL AND HYDROCHLOROTHIAZIDE 25; 20 MG/1; MG/1
1 TABLET ORAL DAILY
Qty: 30 TABLET | Refills: 2 | Status: SHIPPED | OUTPATIENT
Start: 2017-12-26 | End: 2018-09-05 | Stop reason: SDUPTHER

## 2017-12-26 RX ORDER — AMLODIPINE BESYLATE 10 MG/1
10 TABLET ORAL DAILY
Qty: 30 TABLET | Refills: 2 | Status: SHIPPED | OUTPATIENT
Start: 2017-12-26 | End: 2018-08-27 | Stop reason: SDUPTHER

## 2017-12-26 RX ORDER — CLOPIDOGREL BISULFATE 75 MG/1
75 TABLET ORAL NIGHTLY
Qty: 30 TABLET | Refills: 5 | Status: SHIPPED | OUTPATIENT
Start: 2017-12-26 | End: 2019-01-01 | Stop reason: SDUPTHER

## 2017-12-26 RX ORDER — ISOSORBIDE MONONITRATE 60 MG/1
60 TABLET, EXTENDED RELEASE ORAL DAILY
Qty: 30 TABLET | Refills: 2 | Status: SHIPPED | OUTPATIENT
Start: 2017-12-26 | End: 2019-01-01

## 2017-12-26 NOTE — TELEPHONE ENCOUNTER
I am going to send in refills for her but the more recent message stated she was refusing all medications as well as ER visit.  I think if her BP is high, she should be on her medications I am just not sure how to force her to take them!

## 2018-01-01 ENCOUNTER — TELEPHONE (OUTPATIENT)
Dept: FAMILY MEDICINE CLINIC | Facility: CLINIC | Age: 83
End: 2018-01-01

## 2018-01-02 ENCOUNTER — TELEPHONE (OUTPATIENT)
Dept: FAMILY MEDICINE CLINIC | Facility: CLINIC | Age: 83
End: 2018-01-02

## 2018-01-02 NOTE — TELEPHONE ENCOUNTER
----- Message from Rosa Ng sent at 1/2/2018 10:24 AM EST -----  Contact: JULIANN WITH [facility]  NEEDS VERBAL ORDER TO SEE PT FOR ONCE THIS WEEK AND TWICE WEEKLY  FOR TWO MORE WEEKS    [phone]  MESSAGE OK

## 2018-01-03 DIAGNOSIS — M54.41 CHRONIC BILATERAL LOW BACK PAIN WITH BILATERAL SCIATICA: ICD-10-CM

## 2018-01-03 DIAGNOSIS — G89.29 CHRONIC BILATERAL LOW BACK PAIN WITH BILATERAL SCIATICA: ICD-10-CM

## 2018-01-03 DIAGNOSIS — M54.42 CHRONIC BILATERAL LOW BACK PAIN WITH BILATERAL SCIATICA: ICD-10-CM

## 2018-01-04 RX ORDER — GABAPENTIN 600 MG/1
TABLET ORAL
Qty: 270 TABLET | Refills: 0 | Status: SHIPPED | OUTPATIENT
Start: 2018-01-04 | End: 2018-10-09 | Stop reason: SDUPTHER

## 2018-01-15 ENCOUNTER — TELEPHONE (OUTPATIENT)
Dept: FAMILY MEDICINE CLINIC | Facility: CLINIC | Age: 83
End: 2018-01-15

## 2018-01-15 NOTE — TELEPHONE ENCOUNTER
----- Message from Amy Quinteros sent at 1/15/2018 11:46 AM EST -----  Contact: Andres Gautam from [facility] is calling to let Dr. Campos know that patient had a fall at her home this weekend. She just has some bruising and some scratches but is overall okay. She was just calling to report.

## 2018-01-18 ENCOUNTER — TELEPHONE (OUTPATIENT)
Dept: FAMILY MEDICINE CLINIC | Facility: CLINIC | Age: 83
End: 2018-01-18

## 2018-01-18 NOTE — TELEPHONE ENCOUNTER
----- Message from Rosa Woodward sent at 1/18/2018  4:38 PM EST -----  Contact: JOSE MANUEL MORALES WITH [facility] :    NEED VERBAL ORDERS TO CONTINUE NURSING ONCE A WEEK FOR 3 MORE WEEKS  AND TO NOTIFY YOU THAT SHE HAD CHEST PAIN YESTERDAY, RELEVED BY NTG. NO CHEST PAIN TODAY. ASKING THAT SOMEONE PLEASE CALL HER BACK  [phone]

## 2018-01-19 NOTE — TELEPHONE ENCOUNTER
Ok for verbal order. Pt has not been keeping her appointments with us.  Would advise pt ER if she has Chest pain or seeing her cardiologist.

## 2018-04-26 ENCOUNTER — TELEPHONE (OUTPATIENT)
Dept: FAMILY MEDICINE CLINIC | Facility: CLINIC | Age: 83
End: 2018-04-26

## 2018-04-26 NOTE — TELEPHONE ENCOUNTER
----- Message from Amy Quinteros sent at 4/26/2018  4:29 PM EDT -----  Contact: Andres Melton from [facility] is calling because patient is requesting home health for skilled nursing. She is wanting to know if Dr. Campos would order verbal orders. Please call [phone]. Fax# [phone].

## 2018-05-15 ENCOUNTER — TELEPHONE (OUTPATIENT)
Dept: FAMILY MEDICINE CLINIC | Facility: CLINIC | Age: 83
End: 2018-05-15

## 2018-05-15 NOTE — TELEPHONE ENCOUNTER
----- Message from Mayelin Jones sent at 5/15/2018  3:59 PM EDT -----  Contact: JOSE MANUEL; PT UPDATE AND ORDER NEED  NURSE CALLED AND WOULD LIKE A CALL BACK HER BP HAS BEEN RUNNING HIGH. NO CHEST PAIN. BUT SHE ALSO NEEDED A VERBAL HOME HEALTH ORDER.         [facility]   [phone]

## 2018-05-21 ENCOUNTER — OFFICE VISIT (OUTPATIENT)
Dept: FAMILY MEDICINE CLINIC | Facility: CLINIC | Age: 83
End: 2018-05-21

## 2018-05-21 VITALS
RESPIRATION RATE: 18 BRPM | WEIGHT: 107 LBS | TEMPERATURE: 98.3 F | BODY MASS INDEX: 18.96 KG/M2 | HEIGHT: 63 IN | HEART RATE: 78 BPM | DIASTOLIC BLOOD PRESSURE: 78 MMHG | SYSTOLIC BLOOD PRESSURE: 164 MMHG

## 2018-05-21 DIAGNOSIS — F33.1 MODERATE EPISODE OF RECURRENT MAJOR DEPRESSIVE DISORDER (HCC): Primary | ICD-10-CM

## 2018-05-21 PROCEDURE — 99213 OFFICE O/P EST LOW 20 MIN: CPT | Performed by: FAMILY MEDICINE

## 2018-05-21 RX ORDER — ESCITALOPRAM OXALATE 10 MG/1
10 TABLET ORAL DAILY
Qty: 30 TABLET | Refills: 2 | Status: SHIPPED | OUTPATIENT
Start: 2018-05-21 | End: 2018-08-27 | Stop reason: SDUPTHER

## 2018-05-21 RX ORDER — HYDROXYZINE 50 MG/1
TABLET, FILM COATED ORAL
Qty: 40 TABLET | Refills: 1 | Status: SHIPPED | OUTPATIENT
Start: 2018-05-21 | End: 2019-01-01

## 2018-05-21 NOTE — PROGRESS NOTES
Subjective   Angela Berkowitz is a 83 y.o. female.     History of Present Illness     She has been very agitated, does not want people around her  Just gets agitated all the time  Does not want to go out, does not want to l;eave the house  She is irritated by her family  Gets irritated by neighbor  Irritated by home health    The following portions of the patient's history were reviewed and updated as appropriate: allergies, current medications, past family history, past medical history, past social history, past surgical history and problem list.    Review of Systems   Psychiatric/Behavioral: Positive for agitation and dysphoric mood. The patient is nervous/anxious.        Objective   Physical Exam   Constitutional: She appears well-developed and well-nourished. No distress.   Cardiovascular: Normal rate, regular rhythm and normal heart sounds.    Pulmonary/Chest: Effort normal. She has wheezes.   Musculoskeletal: She exhibits edema (right foot +1, trace left foot).   Psychiatric: She has a normal mood and affect. Her behavior is normal.   Nursing note and vitals reviewed.      Assessment/Plan   Angela was seen today for follow-up.    Diagnoses and all orders for this visit:    Moderate episode of recurrent major depressive disorder  -     escitalopram (LEXAPRO) 10 MG tablet; Take 1 tablet by mouth Daily.  -     hydrOXYzine (ATARAX) 50 MG tablet; 1/2-1 po q 6 hours itchin    will start daily lexapro for her mood.  Pros/cons SE discussed with pt.  Recheck in 1 month.    Ok PRN atarax for her mood, caution with fatigue    Pt would greatly benefit from seeing home health for help with medications and possible strengthening.  She is not able to leave the house on her own so is a great candidate for this.

## 2018-05-24 ENCOUNTER — TELEPHONE (OUTPATIENT)
Dept: FAMILY MEDICINE CLINIC | Facility: CLINIC | Age: 83
End: 2018-05-24

## 2018-05-24 NOTE — TELEPHONE ENCOUNTER
----- Message from Rosa Woodward sent at 5/24/2018 10:51 AM EDT -----  Contact: MORRIN  CRYSTAL FROM [facility] CALLING TO LET YOU KNOW THAT SHE PICKED UP ALL HER RX'S UP. AND HER ANXIETY MEDICATION. AND SHE WANTED YOU TO KNOW THAT HER BLOOD PRESSURE IS DOING SO MUCH BETTER. AND HER ANXIETY HAS IMPROVED ALSO. SHE IS TRYING TO GET A MED BOX FOR HER NOW, BECAUSE SHE IS THE ONLY PERSON SHE WILL TAKE HER MEDICATION FOR, SHE IS THE ONLY ONE THAT SHE TRUST.    [phone]

## 2018-06-01 ENCOUNTER — TELEPHONE (OUTPATIENT)
Dept: FAMILY MEDICINE CLINIC | Facility: CLINIC | Age: 83
End: 2018-06-01

## 2018-06-01 NOTE — TELEPHONE ENCOUNTER
----- Message from Amy Quinteros sent at 6/1/2018  4:13 PM EDT -----  Contact: Andres Melton from [facility] is calling to ask Dr. Campos to add to the last note why she needs home health and why they are coming out to see her.

## 2018-06-08 ENCOUNTER — TELEPHONE (OUTPATIENT)
Dept: FAMILY MEDICINE CLINIC | Facility: CLINIC | Age: 83
End: 2018-06-08

## 2018-06-08 NOTE — TELEPHONE ENCOUNTER
----- Message from Farhad Vo sent at 6/8/2018  3:12 PM EDT -----  Contact: JOSE MANUEL / MANISHA WITH [facility] WAS FOLLOWING UP ON THE FOLLOWING MSG AND ALSO WOULD LIKE DR RICHARD ADD THAT SHE IS BEING SEEN FOR COPD WITH [facility]    Contact: Jose Manuel Melton from [facility] is calling to ask Dr. Richard to add to the last note why she needs home health and why they are coming out to see her.       MANISHA  [phone]

## 2018-06-08 NOTE — TELEPHONE ENCOUNTER
----- Message from Farhad Vo sent at 6/8/2018  3:12 PM EDT -----  Contact: JOSE MANUEL / MANISHA WITH [facility] WAS FOLLOWING UP ON THE FOLLOWING MSG AND ALSO WOULD LIKE DR RICHARD ADD THAT SHE IS BEING SEEN FOR COPD WITH [facility]    Contact: Jose Manuel Melton from [facility] is calling to ask Dr. Richard to add to the last note why she needs home health and why they are coming out to see her.       [facility]IS[facility]  [phone]

## 2018-06-08 NOTE — TELEPHONE ENCOUNTER
----- Message from Farhad Vo sent at 6/8/2018  3:12 PM EDT -----  Contact: JOSE MANUEL / MANISHA WITH [facility] WAS FOLLOWING UP ON THE FOLLOWING MSG AND ALSO WOULD LIKE DR RIHCARD ADD THAT SHE IS BEING SEEN FOR COPD WITH [facility]    Contact: Jose Manuel Melton from [facility] is calling to ask Dr. Richard to add to the last note why she needs home health and why they are coming out to see her.       MANISHA  [phone]

## 2018-06-11 ENCOUNTER — TELEPHONE (OUTPATIENT)
Dept: FAMILY MEDICINE CLINIC | Facility: CLINIC | Age: 83
End: 2018-06-11

## 2018-06-11 NOTE — TELEPHONE ENCOUNTER
Sounds great, I did put an addendum to the note about home health but I cannot say it was discussed as it was not.

## 2018-06-11 NOTE — TELEPHONE ENCOUNTER
----- Message from Farhad Vo sent at 6/8/2018  3:12 PM EDT -----  Contact: JOSE MANUEL / MANISHA WITH [facility] WAS FOLLOWING UP ON THE FOLLOWING MSG AND ALSO WOULD LIKE DR RICHARD ADD THAT SHE IS BEING SEEN FOR COPD WITH [facility]    Contact: Jose Manuel Melton from [facility] is calling to ask Dr. Richard to add to the last note why she needs home health and why they are coming out to see her.       MANISHA  [phone]

## 2018-07-09 NOTE — TELEPHONE ENCOUNTER
[address]  Cardiology Consult Note      Tylor Dunn  8/8/1924, 82 y.o.      CC: Syncope              C Miguel Henderson MD:      HPI:   This is a 82 y.o. male with a PMH significant for HTN, HLD, Paroxysmal AFIB (no anticoagulation) and Prostate CA. His wife is a longtime patient of mine. He is here today as a new patient for evaluation of syncope and bradycardia. He presented to [facility] on 7/6/18 after a syncopal episode. He was consulted by Dr. Romulo Morgan who thought this to be vasovagal or orthostatic. Also has hx of LVOT gradient. He typically follows with cardiology at [facility], Dr. Sarwat Claytno, but his wife wanted him to be seen by me. Patient reports passing out after urinating. He says he became lightheaded upon standing, loss consciousness and fell. His wife called EMS and HR was in the 30's. HR recovered to the 60's. Today, he has no complaints. Says he does experience occasional dizziness. Patient doesn't want to take anti-coagulation for his AFIB. Takes medication as prescribed.      Past Medical History:   Diagnosis Date    Cancer [facility])     Prostate    Hemorrhoids     Hyperlipidemia     Hypertension     Influenza A 12/29/2014    Shortness of breath       Past Surgical History:   Procedure Laterality Date    CARPAL TUNNEL RELEASE Right 09/01/15    Right carpal tunnel release      CARPAL TUNNEL RELEASE Left 9/22/15    CYST REMOVAL      right wrist    ENDOSCOPY, COLON, DIAGNOSTIC      HERNIA REPAIR Right 1946    inguinal    UPPER GASTROINTESTINAL ENDOSCOPY      with dilatation      Family History   Problem Relation Age of Onset    Hypertension Other     Stroke Other       Social History   Substance Use Topics    Smoking status: Former Smoker     Quit date: 9/17/1955    Smokeless tobacco: Never Used    Alcohol use No     No Known Allergies      Review of Systems -   Constitutional: Negative for weight gain/loss; malaise, fever  Respiratory: Negative for Asthma;  cough and Verbal order given   PO) Take 1 tablet by mouth daily      mometasone-formoterol (DULERA) 100-5 MCG/ACT inhaler Inhale 1 puff into the lungs 2 times daily. Labs:   No results for input(s): WBC, HGB, HCT, PLT in the last 72 hours. Recent Labs      18   0715   NA  143   K  4.7   CO2  25   BUN  18   CREATININE  1.1   LABGLOM  >60     No results for input(s): BNP in the last 72 hours. Lab Results   Component Value Date    HDL 73 2018    HDL 69 2012    LDLCALC 88 2018    TRIG 83 2018         EK18, reviewed. Atrial Fibrillation      CT of head: 18  No acute intracranial abnormality.       Stable pattern of atrophy and microvascular ischemic disease.            ECHO:18   Left ventricular cavity size is normal. There is moderate asymmetric  hypertrophy of the basal septum. Overall left ventricular systolic function   appears hyperdynamic. Ejection fraction is visually estimated to be >70%. No  regional wall motion abnormalities are noted. There is a late peaking   gradient recorded across the LV outflow tract with a peak gradient of  52mmHg. with Valsalva maneuver.   Mitral annular calcification is present.   Mitral valve leaflets appear mildly thickened.   systolic ant motion of anterior mitral leaflet.   Moderate mitral regurgitation.   Aortic valve appears sclerotic but appears to open adequately.   Mild tricuspid regurgitation.      Stress Nuclear: 17  Summary    Normal myocardial perfusion study.    Normal LV size and systolic function.    Uncontrolled hypertension.    Overall findings represent a low risk study.         Peak HR:86 bpm                                  HR/BP product:93997    Peak BP:190/68 mmHg    Predicted HR: 130 bpm    % of predicted HR: 66    Test duration: 1 min and 40 sec    Reason for termination:Physiologic Maximum        ECG Findings    No ischemic EKG changes.    Nondiagnostic due to failure to reach target.        Arrhythmias    None     Holter Monitor: 7/25/16  DAY 1  1. Rhythm was sinus with episodes of PSVT. 2. There was occasional SVE. 3. There was rare VE, including one couplet. 4. Patient marker correlated with sinus rhythm. 5. No diary. SV [place]: 8.14 seconds (0.01%); 15 beats (0.01%)  DAY 2  1. Rhythm was sinus with episodes of PSVT and Afib. 2. There was rare SVE. 3. There was rare VE. 4. Patient marker was not used. 5. No diary. SV [place]: 7.40 seconds (0.01%); 14 beats (0.01%)  AF. [place]: 5.43 seconds (0.01%); 10 beats (0.01%)    Short runs of atrial fibrillation. Rare PVCs    Corornary angiogram  & Intervention: none on file        ASSESSMENT AND PLAN:      Syncope  Vasovagal or orthostatic in nature ? Has been observed to have HR dropping into 30's  Toprol was decreased while in the hospital; I will have his HOLD for now and continue to monitor. ECHO reviewed. Paroxysmal Atrial Fibrillation  Documented in above holter report and by cardiologists at [address] [place] [address] is 3; Needs to be on anticoag. Patient prefers not to take anti-coagulation due to pain in his legs. 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 Mixed Hyperlipidemia   Continue Pravastatin          F/U in 1 month      Thank you very much for allowing me to participate in the care of your patient. Please do not hesitate to contact me if you have any questions.         Sincerely,    Terry Yun M.D  [facility]

## 2018-08-27 DIAGNOSIS — F33.1 MODERATE EPISODE OF RECURRENT MAJOR DEPRESSIVE DISORDER (HCC): ICD-10-CM

## 2018-08-27 RX ORDER — AMLODIPINE BESYLATE 10 MG/1
10 TABLET ORAL DAILY
Qty: 30 TABLET | Refills: 2 | Status: SHIPPED | OUTPATIENT
Start: 2018-08-27 | End: 2019-01-01

## 2018-08-27 RX ORDER — ESCITALOPRAM OXALATE 10 MG/1
TABLET ORAL
Qty: 30 TABLET | Refills: 2 | Status: SHIPPED | OUTPATIENT
Start: 2018-08-27 | End: 2019-01-01

## 2018-09-05 RX ORDER — LISINOPRIL AND HYDROCHLOROTHIAZIDE 25; 20 MG/1; MG/1
1 TABLET ORAL DAILY
Qty: 30 TABLET | Refills: 1 | Status: SHIPPED | OUTPATIENT
Start: 2018-09-05 | End: 2018-11-20 | Stop reason: SDUPTHER

## 2018-09-06 ENCOUNTER — TELEPHONE (OUTPATIENT)
Dept: FAMILY MEDICINE CLINIC | Facility: CLINIC | Age: 83
End: 2018-09-06

## 2018-09-06 NOTE — TELEPHONE ENCOUNTER
----- Message from Rosa Woodward sent at 9/6/2018  9:14 AM EDT -----  Contact: JOSE MANUEL  PATIENT INSURANCE REQUIRING A 90 DAY SUPPLY ON:  ADVAR 500-50 MG    [facility] [place]  [phone]

## 2018-10-09 DIAGNOSIS — G89.29 CHRONIC BILATERAL LOW BACK PAIN WITH BILATERAL SCIATICA: ICD-10-CM

## 2018-10-09 DIAGNOSIS — M54.42 CHRONIC BILATERAL LOW BACK PAIN WITH BILATERAL SCIATICA: ICD-10-CM

## 2018-10-09 DIAGNOSIS — M54.41 CHRONIC BILATERAL LOW BACK PAIN WITH BILATERAL SCIATICA: ICD-10-CM

## 2018-10-11 RX ORDER — GABAPENTIN 600 MG/1
TABLET ORAL
Qty: 270 TABLET | Refills: 1 | Status: SHIPPED | OUTPATIENT
Start: 2018-10-11 | End: 2019-01-01 | Stop reason: SDUPTHER

## 2018-11-20 RX ORDER — LISINOPRIL AND HYDROCHLOROTHIAZIDE 25; 20 MG/1; MG/1
1 TABLET ORAL DAILY
Qty: 30 TABLET | Refills: 0 | Status: SHIPPED | OUTPATIENT
Start: 2018-11-20 | End: 2019-01-01 | Stop reason: SDUPTHER

## 2018-11-21 ENCOUNTER — TELEPHONE (OUTPATIENT)
Dept: FAMILY MEDICINE CLINIC | Facility: CLINIC | Age: 83
End: 2018-11-21

## 2018-11-21 NOTE — TELEPHONE ENCOUNTER
----- Message from Melvina Winn sent at 11/21/2018 12:37 PM EST -----  Contact: DR JOSE MANUEL KING IS CALLING FROM [facility] TO LET YOU KNOW THAT SHE WAS ADMITTED ON 11/24/18 FOR COPD.

## 2019-01-01 ENCOUNTER — TELEPHONE (OUTPATIENT)
Dept: FAMILY MEDICINE CLINIC | Facility: CLINIC | Age: 84
End: 2019-01-01

## 2019-01-01 ENCOUNTER — HOSPITAL ENCOUNTER (INPATIENT)
Facility: HOSPITAL | Age: 84
LOS: 9 days | Discharge: HOME HEALTH | End: 2019-10-03
Attending: EMERGENCY MEDICINE | Admitting: INTERNAL MEDICINE

## 2019-01-01 ENCOUNTER — APPOINTMENT (OUTPATIENT)
Dept: CT IMAGING | Facility: HOSPITAL | Age: 84
End: 2019-01-01

## 2019-01-01 ENCOUNTER — READMISSION MANAGEMENT (OUTPATIENT)
Dept: CALL CENTER | Facility: HOSPITAL | Age: 84
End: 2019-01-01

## 2019-01-01 ENCOUNTER — OFFICE VISIT (OUTPATIENT)
Dept: FAMILY MEDICINE CLINIC | Facility: CLINIC | Age: 84
End: 2019-01-01

## 2019-01-01 ENCOUNTER — APPOINTMENT (OUTPATIENT)
Dept: MRI IMAGING | Facility: HOSPITAL | Age: 84
End: 2019-01-01

## 2019-01-01 ENCOUNTER — APPOINTMENT (OUTPATIENT)
Dept: CARDIOLOGY | Facility: HOSPITAL | Age: 84
End: 2019-01-01

## 2019-01-01 ENCOUNTER — APPOINTMENT (OUTPATIENT)
Dept: GENERAL RADIOLOGY | Facility: HOSPITAL | Age: 84
End: 2019-01-01

## 2019-01-01 ENCOUNTER — TRANSITIONAL CARE MANAGEMENT TELEPHONE ENCOUNTER (OUTPATIENT)
Dept: INTERNAL MEDICINE | Facility: CLINIC | Age: 84
End: 2019-01-01

## 2019-01-01 ENCOUNTER — HOSPITAL ENCOUNTER (OUTPATIENT)
Dept: GENERAL RADIOLOGY | Facility: HOSPITAL | Age: 84
Discharge: HOME | End: 2019-10-09
Admitting: FAMILY MEDICINE

## 2019-01-01 VITALS
RESPIRATION RATE: 18 BRPM | OXYGEN SATURATION: 90 % | SYSTOLIC BLOOD PRESSURE: 126 MMHG | DIASTOLIC BLOOD PRESSURE: 78 MMHG | HEIGHT: 62 IN | BODY MASS INDEX: 18.58 KG/M2 | HEART RATE: 90 BPM | WEIGHT: 100.97 LBS

## 2019-01-01 VITALS
RESPIRATION RATE: 16 BRPM | WEIGHT: 104 LBS | BODY MASS INDEX: 18.43 KG/M2 | HEIGHT: 63 IN | SYSTOLIC BLOOD PRESSURE: 124 MMHG | TEMPERATURE: 97.4 F | HEART RATE: 84 BPM | DIASTOLIC BLOOD PRESSURE: 60 MMHG

## 2019-01-01 VITALS
WEIGHT: 101 LBS | HEART RATE: 93 BPM | RESPIRATION RATE: 18 BRPM | DIASTOLIC BLOOD PRESSURE: 77 MMHG | HEIGHT: 62 IN | BODY MASS INDEX: 18.58 KG/M2 | TEMPERATURE: 98 F | OXYGEN SATURATION: 94 % | SYSTOLIC BLOOD PRESSURE: 158 MMHG

## 2019-01-01 DIAGNOSIS — M54.42 CHRONIC BILATERAL LOW BACK PAIN WITH BILATERAL SCIATICA: ICD-10-CM

## 2019-01-01 DIAGNOSIS — M54.42 CHRONIC BILATERAL LOW BACK PAIN WITH BILATERAL SCIATICA: Primary | ICD-10-CM

## 2019-01-01 DIAGNOSIS — M54.41 CHRONIC BILATERAL LOW BACK PAIN WITH BILATERAL SCIATICA: ICD-10-CM

## 2019-01-01 DIAGNOSIS — G89.29 CHRONIC BILATERAL LOW BACK PAIN WITH BILATERAL SCIATICA: ICD-10-CM

## 2019-01-01 DIAGNOSIS — I63.9 CEREBROVASCULAR ACCIDENT (CVA), UNSPECIFIED MECHANISM (HCC): ICD-10-CM

## 2019-01-01 DIAGNOSIS — I21.4 NON-STEMI (NON-ST ELEVATED MYOCARDIAL INFARCTION) (HCC): ICD-10-CM

## 2019-01-01 DIAGNOSIS — J43.1 PANLOBULAR EMPHYSEMA (HCC): ICD-10-CM

## 2019-01-01 DIAGNOSIS — N18.30 CKD (CHRONIC KIDNEY DISEASE), STAGE III (HCC): ICD-10-CM

## 2019-01-01 DIAGNOSIS — R53.1 LEFT-SIDED WEAKNESS: ICD-10-CM

## 2019-01-01 DIAGNOSIS — Z74.09 IMPAIRED MOBILITY AND ADLS: ICD-10-CM

## 2019-01-01 DIAGNOSIS — Z09 HOSPITAL DISCHARGE FOLLOW-UP: Primary | ICD-10-CM

## 2019-01-01 DIAGNOSIS — D64.9 SEVERE ANEMIA: ICD-10-CM

## 2019-01-01 DIAGNOSIS — J43.1 PANLOBULAR EMPHYSEMA (HCC): Primary | ICD-10-CM

## 2019-01-01 DIAGNOSIS — I50.9 CHRONIC CONGESTIVE HEART FAILURE, UNSPECIFIED HEART FAILURE TYPE (HCC): ICD-10-CM

## 2019-01-01 DIAGNOSIS — Z78.9 IMPAIRED MOBILITY AND ADLS: ICD-10-CM

## 2019-01-01 DIAGNOSIS — J43.9 PULMONARY EMPHYSEMA, UNSPECIFIED EMPHYSEMA TYPE (HCC): ICD-10-CM

## 2019-01-01 DIAGNOSIS — R05.9 COUGH: ICD-10-CM

## 2019-01-01 DIAGNOSIS — R06.02 SHORTNESS OF BREATH: ICD-10-CM

## 2019-01-01 DIAGNOSIS — G89.29 CHRONIC BILATERAL LOW BACK PAIN WITH BILATERAL SCIATICA: Primary | ICD-10-CM

## 2019-01-01 DIAGNOSIS — M54.41 CHRONIC BILATERAL LOW BACK PAIN WITH BILATERAL SCIATICA: Primary | ICD-10-CM

## 2019-01-01 DIAGNOSIS — I10 ESSENTIAL HYPERTENSION: ICD-10-CM

## 2019-01-01 DIAGNOSIS — R13.14 PHARYNGOESOPHAGEAL DYSPHAGIA: ICD-10-CM

## 2019-01-01 DIAGNOSIS — M47.816 OSTEOARTHRITIS OF LUMBAR SPINE, UNSPECIFIED SPINAL OSTEOARTHRITIS COMPLICATION STATUS: ICD-10-CM

## 2019-01-01 DIAGNOSIS — E78.00 PURE HYPERCHOLESTEROLEMIA: ICD-10-CM

## 2019-01-01 DIAGNOSIS — I63.9 ACUTE CVA (CEREBROVASCULAR ACCIDENT) (HCC): Primary | ICD-10-CM

## 2019-01-01 DIAGNOSIS — Z99.81 DEPENDENCE ON SUPPLEMENTAL OXYGEN: ICD-10-CM

## 2019-01-01 DIAGNOSIS — R53.1 WEAKNESS: ICD-10-CM

## 2019-01-01 LAB
ABO + RH BLD: NORMAL
ABO + RH BLD: NORMAL
ABO GROUP BLD: NORMAL
ACANTHOCYTES BLD QL SMEAR: NORMAL
ACANTHOCYTES BLD QL SMEAR: NORMAL
ALBUMIN SERPL-MCNC: 2.7 G/DL (ref 3.5–5.2)
ALBUMIN SERPL-MCNC: 2.8 G/DL (ref 3.5–5.2)
ALBUMIN SERPL-MCNC: 2.8 G/DL (ref 3.5–5.2)
ALBUMIN SERPL-MCNC: 3.2 G/DL (ref 3.5–5.2)
ALBUMIN/GLOB SERPL: 0.6 G/DL
ALBUMIN/GLOB SERPL: 0.7 G/DL
ALBUMIN/GLOB SERPL: 0.7 G/DL
ALBUMIN/GLOB SERPL: 1 G/DL
ALP SERPL-CCNC: 59 U/L (ref 39–117)
ALP SERPL-CCNC: 66 U/L (ref 39–117)
ALP SERPL-CCNC: 67 U/L (ref 39–117)
ALP SERPL-CCNC: 69 U/L (ref 39–117)
ALT SERPL W P-5'-P-CCNC: 5 U/L (ref 1–33)
ALT SERPL W P-5'-P-CCNC: <5 U/L (ref 1–33)
ANION GAP SERPL CALCULATED.3IONS-SCNC: 10 MMOL/L (ref 5–15)
ANION GAP SERPL CALCULATED.3IONS-SCNC: 11 MMOL/L (ref 5–15)
ANION GAP SERPL CALCULATED.3IONS-SCNC: 12 MMOL/L (ref 5–15)
ANION GAP SERPL CALCULATED.3IONS-SCNC: 13 MMOL/L (ref 5–15)
ANION GAP SERPL CALCULATED.3IONS-SCNC: 7 MMOL/L (ref 5–15)
ANION GAP SERPL CALCULATED.3IONS-SCNC: 7 MMOL/L (ref 5–15)
ANION GAP SERPL CALCULATED.3IONS-SCNC: 9 MMOL/L (ref 5–15)
ANION GAP SERPL CALCULATED.3IONS-SCNC: 9 MMOL/L (ref 5–15)
APTT PPP: 28.2 SECONDS (ref 24–37)
ARTERIAL PATENCY WRIST A: ABNORMAL
ARTERIAL PATENCY WRIST A: ABNORMAL
AST SERPL-CCNC: 10 U/L (ref 1–32)
AST SERPL-CCNC: 11 U/L (ref 1–32)
AST SERPL-CCNC: 11 U/L (ref 1–32)
AST SERPL-CCNC: 7 U/L (ref 1–32)
AST SERPL-CCNC: 8 U/L (ref 1–32)
ATMOSPHERIC PRESS: ABNORMAL MM[HG]
ATMOSPHERIC PRESS: ABNORMAL MM[HG]
BACTERIA SPEC AEROBE CULT: NORMAL
BASE EXCESS BLDA CALC-SCNC: -0.7 MMOL/L (ref 0–2)
BASE EXCESS BLDA CALC-SCNC: -1.7 MMOL/L (ref 0–2)
BASOPHILS # BLD AUTO: 0.01 10*3/MM3 (ref 0–0.2)
BASOPHILS # BLD AUTO: 0.03 10*3/MM3 (ref 0–0.2)
BASOPHILS # BLD AUTO: 0.03 10*3/MM3 (ref 0–0.2)
BASOPHILS # BLD AUTO: 0.04 10*3/MM3 (ref 0–0.2)
BASOPHILS # BLD AUTO: 0.05 10*3/MM3 (ref 0–0.2)
BASOPHILS # BLD AUTO: 0.07 10*3/MM3 (ref 0–0.2)
BASOPHILS NFR BLD AUTO: 0.2 % (ref 0–1.5)
BASOPHILS NFR BLD AUTO: 0.3 % (ref 0–1.5)
BASOPHILS NFR BLD AUTO: 0.3 % (ref 0–1.5)
BASOPHILS NFR BLD AUTO: 0.4 % (ref 0–1.5)
BASOPHILS NFR BLD AUTO: 0.5 % (ref 0–1.5)
BASOPHILS NFR BLD AUTO: 0.7 % (ref 0–1.5)
BDY SITE: ABNORMAL
BDY SITE: ABNORMAL
BH BB BLOOD EXPIRATION DATE: NORMAL
BH BB BLOOD EXPIRATION DATE: NORMAL
BH BB BLOOD TYPE BARCODE: 6200
BH BB BLOOD TYPE BARCODE: 6200
BH BB DISPENSE STATUS: NORMAL
BH BB DISPENSE STATUS: NORMAL
BH BB PRODUCT CODE: NORMAL
BH BB PRODUCT CODE: NORMAL
BH BB UNIT NUMBER: NORMAL
BH BB UNIT NUMBER: NORMAL
BH CV ECHO MEAS - AI DEC SLOPE: 257.5 CM/SEC^2
BH CV ECHO MEAS - AI MAX PG: 59.3 MMHG
BH CV ECHO MEAS - AI MAX VEL: 384.9 CM/SEC
BH CV ECHO MEAS - AI P1/2T: 437.9 MSEC
BH CV ECHO MEAS - AO MAX PG (FULL): 15.1 MMHG
BH CV ECHO MEAS - AO MAX PG: 19.1 MMHG
BH CV ECHO MEAS - AO MEAN PG (FULL): 9.2 MMHG
BH CV ECHO MEAS - AO MEAN PG: 11.2 MMHG
BH CV ECHO MEAS - AO ROOT AREA (BSA CORRECTED): 1.7
BH CV ECHO MEAS - AO ROOT AREA: 4.4 CM^2
BH CV ECHO MEAS - AO ROOT DIAM: 2.4 CM
BH CV ECHO MEAS - AO V2 MAX: 218.4 CM/SEC
BH CV ECHO MEAS - AO V2 MEAN: 159.5 CM/SEC
BH CV ECHO MEAS - AO V2 VTI: 43.8 CM
BH CV ECHO MEAS - ASC AORTA: 2.5 CM
BH CV ECHO MEAS - AVA(I,A): 0.98 CM^2
BH CV ECHO MEAS - AVA(I,D): 0.98 CM^2
BH CV ECHO MEAS - AVA(V,A): 1.1 CM^2
BH CV ECHO MEAS - AVA(V,D): 1.1 CM^2
BH CV ECHO MEAS - BSA(HAYCOCK): 1.4 M^2
BH CV ECHO MEAS - BSA(HAYCOCK): 1.4 M^2
BH CV ECHO MEAS - BSA: 1.4 M^2
BH CV ECHO MEAS - BSA: 1.4 M^2
BH CV ECHO MEAS - BZI_BMI: 18.5 KILOGRAMS/M^2
BH CV ECHO MEAS - BZI_BMI: 18.5 KILOGRAMS/M^2
BH CV ECHO MEAS - BZI_METRIC_HEIGHT: 157.5 CM
BH CV ECHO MEAS - BZI_METRIC_HEIGHT: 157.5 CM
BH CV ECHO MEAS - BZI_METRIC_WEIGHT: 45.8 KG
BH CV ECHO MEAS - BZI_METRIC_WEIGHT: 45.8 KG
BH CV ECHO MEAS - EDV(CUBED): 67.6 ML
BH CV ECHO MEAS - EDV(MOD-SP2): 54 ML
BH CV ECHO MEAS - EDV(MOD-SP4): 44 ML
BH CV ECHO MEAS - EDV(TEICH): 73.1 ML
BH CV ECHO MEAS - EF(CUBED): 62.7 %
BH CV ECHO MEAS - EF(MOD-BP): 63 %
BH CV ECHO MEAS - EF(MOD-SP2): 64.8 %
BH CV ECHO MEAS - EF(MOD-SP4): 61.4 %
BH CV ECHO MEAS - EF(TEICH): 54.8 %
BH CV ECHO MEAS - ESV(CUBED): 25.2 ML
BH CV ECHO MEAS - ESV(MOD-SP2): 19 ML
BH CV ECHO MEAS - ESV(MOD-SP4): 17 ML
BH CV ECHO MEAS - ESV(TEICH): 33.1 ML
BH CV ECHO MEAS - FS: 28 %
BH CV ECHO MEAS - IVS/LVPW: 1
BH CV ECHO MEAS - IVSD: 1.2 CM
BH CV ECHO MEAS - LA DIMENSION: 3.9 CM
BH CV ECHO MEAS - LA/AO: 1.6
BH CV ECHO MEAS - LAD MAJOR: 4.4 CM
BH CV ECHO MEAS - LV DIASTOLIC VOL/BSA (35-75): 30.8 ML/M^2
BH CV ECHO MEAS - LV MASS(C)D: 166 GRAMS
BH CV ECHO MEAS - LV MASS(C)DI: 116.1 GRAMS/M^2
BH CV ECHO MEAS - LV MAX PG: 4 MMHG
BH CV ECHO MEAS - LV MEAN PG: 1.9 MMHG
BH CV ECHO MEAS - LV SYSTOLIC VOL/BSA (12-30): 11.9 ML/M^2
BH CV ECHO MEAS - LV V1 MAX: 100.3 CM/SEC
BH CV ECHO MEAS - LV V1 MEAN: 64 CM/SEC
BH CV ECHO MEAS - LV V1 VTI: 17.8 CM
BH CV ECHO MEAS - LVIDD: 4.1 CM
BH CV ECHO MEAS - LVIDS: 2.9 CM
BH CV ECHO MEAS - LVLD AP2: 6.5 CM
BH CV ECHO MEAS - LVLD AP4: 6.2 CM
BH CV ECHO MEAS - LVLS AP2: 5.4 CM
BH CV ECHO MEAS - LVLS AP4: 5.6 CM
BH CV ECHO MEAS - LVOT AREA (M): 2.5 CM^2
BH CV ECHO MEAS - LVOT AREA: 2.4 CM^2
BH CV ECHO MEAS - LVOT DIAM: 1.8 CM
BH CV ECHO MEAS - LVPWD: 1.2 CM
BH CV ECHO MEAS - MV A MAX VEL: 71 CM/SEC
BH CV ECHO MEAS - MV DEC SLOPE: 1117 CM/SEC^2
BH CV ECHO MEAS - MV E MAX VEL: 150.4 CM/SEC
BH CV ECHO MEAS - MV E/A: 2.1
BH CV ECHO MEAS - MV MAX PG: 13.5 MMHG
BH CV ECHO MEAS - MV MEAN PG: 4.6 MMHG
BH CV ECHO MEAS - MV P1/2T MAX VEL: 182.2 CM/SEC
BH CV ECHO MEAS - MV P1/2T: 47.8 MSEC
BH CV ECHO MEAS - MV V2 MAX: 183.4 CM/SEC
BH CV ECHO MEAS - MV V2 MEAN: 92.9 CM/SEC
BH CV ECHO MEAS - MV V2 VTI: 36.3 CM
BH CV ECHO MEAS - MVA P1/2T LCG: 1.2 CM^2
BH CV ECHO MEAS - MVA(P1/2T): 4.6 CM^2
BH CV ECHO MEAS - MVA(VTI): 1.2 CM^2
BH CV ECHO MEAS - PA ACC SLOPE: 398.4 CM/SEC^2
BH CV ECHO MEAS - PA ACC TIME: 0.13 SEC
BH CV ECHO MEAS - PA MAX PG: 4.4 MMHG
BH CV ECHO MEAS - PA PR(ACCEL): 18.8 MMHG
BH CV ECHO MEAS - PA V2 MAX: 105.3 CM/SEC
BH CV ECHO MEAS - PI END-D VEL: 134.7 CM/SEC
BH CV ECHO MEAS - RAP SYSTOLE: 8 MMHG
BH CV ECHO MEAS - RVSP: 61.7 MMHG
BH CV ECHO MEAS - SI(AO): 136.2 ML/M^2
BH CV ECHO MEAS - SI(CUBED): 29.7 ML/M^2
BH CV ECHO MEAS - SI(LVOT): 30.1 ML/M^2
BH CV ECHO MEAS - SI(MOD-SP2): 24.5 ML/M^2
BH CV ECHO MEAS - SI(MOD-SP4): 18.9 ML/M^2
BH CV ECHO MEAS - SI(TEICH): 28 ML/M^2
BH CV ECHO MEAS - SV(AO): 194.8 ML
BH CV ECHO MEAS - SV(CUBED): 42.4 ML
BH CV ECHO MEAS - SV(LVOT): 43 ML
BH CV ECHO MEAS - SV(MOD-SP2): 35 ML
BH CV ECHO MEAS - SV(MOD-SP4): 27 ML
BH CV ECHO MEAS - SV(TEICH): 40 ML
BH CV ECHO MEAS - TAPSE (>1.6): 0.9 CM2
BH CV ECHO MEAS - TR MAX PG: 53.7 MMHG
BH CV ECHO MEAS - TR MAX VEL: 366.5 CM/SEC
BH CV VAS BP RIGHT ARM: NORMAL MMHG
BH CV XLRA - RV BASE: 4.1 CM
BH CV XLRA - RV LENGTH: 4.2 CM
BH CV XLRA - RV MID: 2 CM
BH CV XLRA - TDI S': 6.23 CM/SEC
BH CV XLRA MEAS LEFT CCA RATIO VEL: 63.3 CM/SEC
BH CV XLRA MEAS LEFT DIST CCA EDV: 17.5 CM/SEC
BH CV XLRA MEAS LEFT DIST CCA PSV: 63.7 CM/SEC
BH CV XLRA MEAS LEFT DIST ICA EDV: 19.3 CM/SEC
BH CV XLRA MEAS LEFT DIST ICA PSV: 71.1 CM/SEC
BH CV XLRA MEAS LEFT ICA RATIO VEL: 434 CM/SEC
BH CV XLRA MEAS LEFT ICA/CCA RATIO: 6.9
BH CV XLRA MEAS LEFT MID CCA EDV: 20.1 CM/SEC
BH CV XLRA MEAS LEFT MID CCA PSV: 68.5 CM/SEC
BH CV XLRA MEAS LEFT MID ICA EDV: 84.9 CM/SEC
BH CV XLRA MEAS LEFT MID ICA PSV: 286 CM/SEC
BH CV XLRA MEAS LEFT PROX CCA EDV: 13.2 CM/SEC
BH CV XLRA MEAS LEFT PROX CCA PSV: 89.3 CM/SEC
BH CV XLRA MEAS LEFT PROX ECA PSV: 247.5 CM/SEC
BH CV XLRA MEAS LEFT PROX ICA EDV: 120 CM/SEC
BH CV XLRA MEAS LEFT PROX ICA PSV: 395 CM/SEC
BH CV XLRA MEAS LEFT PROX SCLA PSV: 252.6 CM/SEC
BH CV XLRA MEAS LEFT VERTEBRAL A EDV: 21.6 CM/SEC
BH CV XLRA MEAS LEFT VERTEBRAL A PSV: 125.7 CM/SEC
BH CV XLRA MEAS RIGHT CCA RATIO VEL: 115 CM/SEC
BH CV XLRA MEAS RIGHT DIST CCA EDV: 16.5 CM/SEC
BH CV XLRA MEAS RIGHT DIST CCA PSV: 115.5 CM/SEC
BH CV XLRA MEAS RIGHT DIST ICA EDV: 33 CM/SEC
BH CV XLRA MEAS RIGHT DIST ICA PSV: 132.8 CM/SEC
BH CV XLRA MEAS RIGHT ICA RATIO VEL: 343 CM/SEC
BH CV XLRA MEAS RIGHT ICA/CCA RATIO: 3
BH CV XLRA MEAS RIGHT MID CCA EDV: 17.3 CM/SEC
BH CV XLRA MEAS RIGHT MID CCA PSV: 123.4 CM/SEC
BH CV XLRA MEAS RIGHT MID ICA EDV: 33 CM/SEC
BH CV XLRA MEAS RIGHT MID ICA PSV: 132 CM/SEC
BH CV XLRA MEAS RIGHT PROX CCA EDV: 13.4 CM/SEC
BH CV XLRA MEAS RIGHT PROX CCA PSV: 118.6 CM/SEC
BH CV XLRA MEAS RIGHT PROX ECA PSV: 340.9 CM/SEC
BH CV XLRA MEAS RIGHT PROX ICA EDV: 87 CM/SEC
BH CV XLRA MEAS RIGHT PROX ICA PSV: 345.7 CM/SEC
BH CV XLRA MEAS RIGHT PROX SCLA PSV: 150.1 CM/SEC
BH CV XLRA MEAS RIGHT VERTEBRAL A EDV: 11.8 CM/SEC
BH CV XLRA MEAS RIGHT VERTEBRAL A PSV: 65.8 CM/SEC
BILIRUB SERPL-MCNC: 0.4 MG/DL (ref 0.2–1.2)
BILIRUB SERPL-MCNC: 0.4 MG/DL (ref 0.2–1.2)
BILIRUB SERPL-MCNC: 0.5 MG/DL (ref 0.2–1.2)
BILIRUB SERPL-MCNC: 0.6 MG/DL (ref 0.2–1.2)
BILIRUB UR QL STRIP: NEGATIVE
BLD GP AB SCN SERPL QL: NEGATIVE
BODY TEMPERATURE: 98.6 C
BODY TEMPERATURE: 98.6 C
BUN BLD-MCNC: 15 MG/DL (ref 8–23)
BUN BLD-MCNC: 16 MG/DL (ref 8–23)
BUN BLD-MCNC: 21 MG/DL (ref 8–23)
BUN BLD-MCNC: 23 MG/DL (ref 8–23)
BUN BLD-MCNC: 24 MG/DL (ref 8–23)
BUN BLD-MCNC: 28 MG/DL (ref 8–23)
BUN BLD-MCNC: 28 MG/DL (ref 8–23)
BUN BLDA-MCNC: 34 MG/DL (ref 8–26)
BUN BLDA-MCNC: 41 MG/DL (ref 8–26)
BUN/CREAT SERPL: 14.7 (ref 7–25)
BUN/CREAT SERPL: 14.9 (ref 7–25)
BUN/CREAT SERPL: 16.1 (ref 7–25)
BUN/CREAT SERPL: 16.7 (ref 7–25)
BUN/CREAT SERPL: 17.1 (ref 7–25)
BUN/CREAT SERPL: 17.2 (ref 7–25)
BUN/CREAT SERPL: 18.5 (ref 7–25)
BUN/CREAT SERPL: 20.8 (ref 7–25)
BUN/CREAT SERPL: 23 (ref 7–25)
BUN/CREAT SERPL: 23.5 (ref 7–25)
CA-I BLDA-SCNC: 0.89 MMOL/L (ref 1.2–1.32)
CA-I BLDA-SCNC: 1.25 MMOL/L (ref 1.2–1.32)
CALCIUM SPEC-SCNC: 8.4 MG/DL (ref 8.6–10.5)
CALCIUM SPEC-SCNC: 8.6 MG/DL (ref 8.6–10.5)
CALCIUM SPEC-SCNC: 8.6 MG/DL (ref 8.6–10.5)
CALCIUM SPEC-SCNC: 8.7 MG/DL (ref 8.6–10.5)
CALCIUM SPEC-SCNC: 8.7 MG/DL (ref 8.6–10.5)
CALCIUM SPEC-SCNC: 8.8 MG/DL (ref 8.6–10.5)
CALCIUM SPEC-SCNC: 8.8 MG/DL (ref 8.6–10.5)
CALCIUM SPEC-SCNC: 8.9 MG/DL (ref 8.6–10.5)
CALCIUM SPEC-SCNC: 9.1 MG/DL (ref 8.6–10.5)
CALCIUM SPEC-SCNC: 9.2 MG/DL (ref 8.6–10.5)
CHLORIDE BLDA-SCNC: 107 MMOL/L (ref 98–109)
CHLORIDE BLDA-SCNC: 112 MMOL/L (ref 98–109)
CHLORIDE SERPL-SCNC: 105 MMOL/L (ref 98–107)
CHLORIDE SERPL-SCNC: 105 MMOL/L (ref 98–107)
CHLORIDE SERPL-SCNC: 106 MMOL/L (ref 98–107)
CHLORIDE SERPL-SCNC: 106 MMOL/L (ref 98–107)
CHLORIDE SERPL-SCNC: 107 MMOL/L (ref 98–107)
CHLORIDE SERPL-SCNC: 108 MMOL/L (ref 98–107)
CHLORIDE SERPL-SCNC: 109 MMOL/L (ref 98–107)
CHLORIDE SERPL-SCNC: 110 MMOL/L (ref 98–107)
CHOLEST SERPL-MCNC: 160 MG/DL (ref 0–200)
CLARITY UR: CLEAR
CO2 BLDA-SCNC: 25 MMOL/L (ref 24–29)
CO2 BLDA-SCNC: 27 MMOL/L (ref 24–29)
CO2 BLDA-SCNC: 27.5 MMOL/L (ref 23–27)
CO2 BLDA-SCNC: 28.4 MMOL/L (ref 23–27)
CO2 SERPL-SCNC: 24 MMOL/L (ref 22–29)
CO2 SERPL-SCNC: 25 MMOL/L (ref 22–29)
CO2 SERPL-SCNC: 26 MMOL/L (ref 22–29)
CO2 SERPL-SCNC: 27 MMOL/L (ref 22–29)
CO2 SERPL-SCNC: 31 MMOL/L (ref 22–29)
CO2 SERPL-SCNC: 31 MMOL/L (ref 22–29)
COHGB MFR BLD: 1.3 % (ref 0–2)
COHGB MFR BLD: 1.6 % (ref 0–2)
COLOR UR: YELLOW
CREAT BLD-MCNC: 0.93 MG/DL (ref 0.57–1)
CREAT BLD-MCNC: 1.01 MG/DL (ref 0.57–1)
CREAT BLD-MCNC: 1.09 MG/DL (ref 0.57–1)
CREAT BLD-MCNC: 1.19 MG/DL (ref 0.57–1)
CREAT BLD-MCNC: 1.22 MG/DL (ref 0.57–1)
CREAT BLD-MCNC: 1.22 MG/DL (ref 0.57–1)
CREAT BLD-MCNC: 1.23 MG/DL (ref 0.57–1)
CREAT BLD-MCNC: 1.24 MG/DL (ref 0.57–1)
CREAT BLD-MCNC: 1.41 MG/DL (ref 0.57–1)
CREAT BLD-MCNC: 1.44 MG/DL (ref 0.57–1)
CREAT BLDA-MCNC: 1.3 MG/DL (ref 0.6–1.3)
CREAT BLDA-MCNC: 1.3 MG/DL (ref 0.6–1.3)
CROSSMATCH INTERPRETATION: NORMAL
CROSSMATCH INTERPRETATION: NORMAL
D-LACTATE SERPL-SCNC: 0.6 MMOL/L (ref 0.5–2)
D-LACTATE SERPL-SCNC: 0.8 MMOL/L (ref 0.5–2)
D-LACTATE SERPL-SCNC: 0.9 MMOL/L (ref 0.5–2)
DEPRECATED RDW RBC AUTO: 50.4 FL (ref 37–54)
DEPRECATED RDW RBC AUTO: 53.1 FL (ref 37–54)
DEPRECATED RDW RBC AUTO: 55.5 FL (ref 37–54)
DEPRECATED RDW RBC AUTO: 55.7 FL (ref 37–54)
DEPRECATED RDW RBC AUTO: 57.4 FL (ref 37–54)
DEPRECATED RDW RBC AUTO: 58.1 FL (ref 37–54)
DEPRECATED RDW RBC AUTO: 59.7 FL (ref 37–54)
DEPRECATED RDW RBC AUTO: 59.7 FL (ref 37–54)
DEPRECATED RDW RBC AUTO: 60.1 FL (ref 37–54)
DEPRECATED RDW RBC AUTO: 61.1 FL (ref 37–54)
DEPRECATED RDW RBC AUTO: 62.1 FL (ref 37–54)
DEVELOPER EXPIRATION DATE: 622
DEVELOPER LOT NUMBER: NORMAL
EOSINOPHIL # BLD AUTO: 0 10*3/MM3 (ref 0–0.4)
EOSINOPHIL # BLD AUTO: 0.03 10*3/MM3 (ref 0–0.4)
EOSINOPHIL # BLD AUTO: 0.05 10*3/MM3 (ref 0–0.4)
EOSINOPHIL # BLD AUTO: 0.18 10*3/MM3 (ref 0–0.4)
EOSINOPHIL # BLD AUTO: 0.21 10*3/MM3 (ref 0–0.4)
EOSINOPHIL # BLD AUTO: 0.28 10*3/MM3 (ref 0–0.4)
EOSINOPHIL NFR BLD AUTO: 0 % (ref 0.3–6.2)
EOSINOPHIL NFR BLD AUTO: 0.2 % (ref 0.3–6.2)
EOSINOPHIL NFR BLD AUTO: 0.5 % (ref 0.3–6.2)
EOSINOPHIL NFR BLD AUTO: 1.7 % (ref 0.3–6.2)
EOSINOPHIL NFR BLD AUTO: 2 % (ref 0.3–6.2)
EOSINOPHIL NFR BLD AUTO: 3.6 % (ref 0.3–6.2)
EPAP: 6
ERYTHROCYTE [DISTWIDTH] IN BLOOD BY AUTOMATED COUNT: 17.2 % (ref 12.3–15.4)
ERYTHROCYTE [DISTWIDTH] IN BLOOD BY AUTOMATED COUNT: 18.6 % (ref 12.3–15.4)
ERYTHROCYTE [DISTWIDTH] IN BLOOD BY AUTOMATED COUNT: 18.6 % (ref 12.3–15.4)
ERYTHROCYTE [DISTWIDTH] IN BLOOD BY AUTOMATED COUNT: 18.9 % (ref 12.3–15.4)
ERYTHROCYTE [DISTWIDTH] IN BLOOD BY AUTOMATED COUNT: 19.4 % (ref 12.3–15.4)
ERYTHROCYTE [DISTWIDTH] IN BLOOD BY AUTOMATED COUNT: 19.9 % (ref 12.3–15.4)
ERYTHROCYTE [DISTWIDTH] IN BLOOD BY AUTOMATED COUNT: 20.9 % (ref 12.3–15.4)
ERYTHROCYTE [DISTWIDTH] IN BLOOD BY AUTOMATED COUNT: 21.1 % (ref 12.3–15.4)
ERYTHROCYTE [DISTWIDTH] IN BLOOD BY AUTOMATED COUNT: 21.1 % (ref 12.3–15.4)
EXPIRATION DATE: 222
FECAL OCCULT BLOOD SCREEN, POC: NEGATIVE
FERRITIN SERPL-MCNC: 15.16 NG/ML (ref 13–150)
FOLATE SERPL-MCNC: 9 NG/ML (ref 4.78–24.2)
GFR SERPL CREATININE-BSD FRML MDRD: 35 ML/MIN/1.73
GFR SERPL CREATININE-BSD FRML MDRD: 36 ML/MIN/1.73
GFR SERPL CREATININE-BSD FRML MDRD: 41 ML/MIN/1.73
GFR SERPL CREATININE-BSD FRML MDRD: 42 ML/MIN/1.73
GFR SERPL CREATININE-BSD FRML MDRD: 43 ML/MIN/1.73
GFR SERPL CREATININE-BSD FRML MDRD: 48 ML/MIN/1.73
GFR SERPL CREATININE-BSD FRML MDRD: 52 ML/MIN/1.73
GFR SERPL CREATININE-BSD FRML MDRD: 58 ML/MIN/1.73
GLOBULIN UR ELPH-MCNC: 3.2 GM/DL
GLOBULIN UR ELPH-MCNC: 3.9 GM/DL
GLOBULIN UR ELPH-MCNC: 4.1 GM/DL
GLOBULIN UR ELPH-MCNC: 4.3 GM/DL
GLUCOSE BLD-MCNC: 101 MG/DL (ref 65–99)
GLUCOSE BLD-MCNC: 106 MG/DL (ref 65–99)
GLUCOSE BLD-MCNC: 107 MG/DL (ref 65–99)
GLUCOSE BLD-MCNC: 118 MG/DL (ref 65–99)
GLUCOSE BLD-MCNC: 134 MG/DL (ref 65–99)
GLUCOSE BLD-MCNC: 145 MG/DL (ref 65–99)
GLUCOSE BLD-MCNC: 81 MG/DL (ref 65–99)
GLUCOSE BLD-MCNC: 86 MG/DL (ref 65–99)
GLUCOSE BLD-MCNC: 88 MG/DL (ref 65–99)
GLUCOSE BLD-MCNC: 91 MG/DL (ref 65–99)
GLUCOSE BLDC GLUCOMTR-MCNC: 101 MG/DL (ref 70–130)
GLUCOSE BLDC GLUCOMTR-MCNC: 102 MG/DL (ref 70–130)
GLUCOSE BLDC GLUCOMTR-MCNC: 104 MG/DL (ref 70–130)
GLUCOSE BLDC GLUCOMTR-MCNC: 109 MG/DL (ref 70–130)
GLUCOSE BLDC GLUCOMTR-MCNC: 110 MG/DL (ref 70–130)
GLUCOSE BLDC GLUCOMTR-MCNC: 111 MG/DL (ref 70–130)
GLUCOSE BLDC GLUCOMTR-MCNC: 112 MG/DL (ref 70–130)
GLUCOSE BLDC GLUCOMTR-MCNC: 114 MG/DL (ref 70–130)
GLUCOSE BLDC GLUCOMTR-MCNC: 116 MG/DL (ref 70–130)
GLUCOSE BLDC GLUCOMTR-MCNC: 116 MG/DL (ref 70–130)
GLUCOSE BLDC GLUCOMTR-MCNC: 121 MG/DL (ref 70–130)
GLUCOSE BLDC GLUCOMTR-MCNC: 129 MG/DL (ref 70–130)
GLUCOSE BLDC GLUCOMTR-MCNC: 131 MG/DL (ref 70–130)
GLUCOSE BLDC GLUCOMTR-MCNC: 134 MG/DL (ref 70–130)
GLUCOSE BLDC GLUCOMTR-MCNC: 134 MG/DL (ref 70–130)
GLUCOSE BLDC GLUCOMTR-MCNC: 137 MG/DL (ref 70–130)
GLUCOSE BLDC GLUCOMTR-MCNC: 137 MG/DL (ref 70–130)
GLUCOSE BLDC GLUCOMTR-MCNC: 146 MG/DL (ref 70–130)
GLUCOSE BLDC GLUCOMTR-MCNC: 149 MG/DL (ref 70–130)
GLUCOSE BLDC GLUCOMTR-MCNC: 155 MG/DL (ref 70–130)
GLUCOSE BLDC GLUCOMTR-MCNC: 159 MG/DL (ref 70–130)
GLUCOSE BLDC GLUCOMTR-MCNC: 162 MG/DL (ref 70–130)
GLUCOSE BLDC GLUCOMTR-MCNC: 171 MG/DL (ref 70–130)
GLUCOSE BLDC GLUCOMTR-MCNC: 173 MG/DL (ref 70–130)
GLUCOSE BLDC GLUCOMTR-MCNC: 173 MG/DL (ref 70–130)
GLUCOSE BLDC GLUCOMTR-MCNC: 183 MG/DL (ref 70–130)
GLUCOSE BLDC GLUCOMTR-MCNC: 185 MG/DL (ref 70–130)
GLUCOSE BLDC GLUCOMTR-MCNC: 192 MG/DL (ref 70–130)
GLUCOSE BLDC GLUCOMTR-MCNC: 196 MG/DL (ref 70–130)
GLUCOSE BLDC GLUCOMTR-MCNC: 215 MG/DL (ref 70–130)
GLUCOSE BLDC GLUCOMTR-MCNC: 223 MG/DL (ref 70–130)
GLUCOSE BLDC GLUCOMTR-MCNC: 228 MG/DL (ref 70–130)
GLUCOSE BLDC GLUCOMTR-MCNC: 76 MG/DL (ref 70–130)
GLUCOSE BLDC GLUCOMTR-MCNC: 78 MG/DL (ref 70–130)
GLUCOSE BLDC GLUCOMTR-MCNC: 82 MG/DL (ref 70–130)
GLUCOSE BLDC GLUCOMTR-MCNC: 90 MG/DL (ref 70–130)
GLUCOSE BLDC GLUCOMTR-MCNC: 93 MG/DL (ref 70–130)
GLUCOSE BLDC GLUCOMTR-MCNC: 93 MG/DL (ref 70–130)
GLUCOSE BLDC GLUCOMTR-MCNC: 96 MG/DL (ref 70–130)
GLUCOSE BLDC GLUCOMTR-MCNC: 97 MG/DL (ref 70–130)
GLUCOSE BLDC GLUCOMTR-MCNC: 98 MG/DL (ref 70–130)
GLUCOSE UR STRIP-MCNC: NEGATIVE MG/DL
HBA1C MFR BLD: 5.6 % (ref 4.8–5.6)
HCO3 BLDA-SCNC: 25.7 MMOL/L (ref 20–26)
HCO3 BLDA-SCNC: 26.6 MMOL/L (ref 20–26)
HCT VFR BLD AUTO: 23 % (ref 34–46.6)
HCT VFR BLD AUTO: 24 % (ref 34–46.6)
HCT VFR BLD AUTO: 27.2 % (ref 34–46.6)
HCT VFR BLD AUTO: 27.9 % (ref 34–46.6)
HCT VFR BLD AUTO: 28.8 % (ref 34–46.6)
HCT VFR BLD AUTO: 29.4 % (ref 34–46.6)
HCT VFR BLD AUTO: 29.7 % (ref 34–46.6)
HCT VFR BLD AUTO: 30.6 % (ref 34–46.6)
HCT VFR BLD AUTO: 30.7 % (ref 34–46.6)
HCT VFR BLD AUTO: 30.8 % (ref 34–46.6)
HCT VFR BLD AUTO: 31.4 % (ref 34–46.6)
HCT VFR BLD AUTO: 32.7 % (ref 34–46.6)
HCT VFR BLD AUTO: 33.4 % (ref 34–46.6)
HCT VFR BLD AUTO: 33.6 % (ref 34–46.6)
HCT VFR BLD CALC: 26 %
HCT VFR BLD CALC: 26.2 %
HCT VFR BLDA CALC: 21 % (ref 38–51)
HCT VFR BLDA CALC: 24 % (ref 38–51)
HDLC SERPL-MCNC: 48 MG/DL (ref 40–60)
HEMOCCULT STL QL: NEGATIVE
HGB BLD-MCNC: 5.9 G/DL (ref 12–15.9)
HGB BLD-MCNC: 6.2 G/DL (ref 12–15.9)
HGB BLD-MCNC: 7.3 G/DL (ref 12–15.9)
HGB BLD-MCNC: 8 G/DL (ref 12–15.9)
HGB BLD-MCNC: 8 G/DL (ref 12–15.9)
HGB BLD-MCNC: 8.2 G/DL (ref 12–15.9)
HGB BLD-MCNC: 8.3 G/DL (ref 12–15.9)
HGB BLD-MCNC: 8.5 G/DL (ref 12–15.9)
HGB BLD-MCNC: 8.8 G/DL (ref 12–15.9)
HGB BLD-MCNC: 8.9 G/DL (ref 12–15.9)
HGB BLD-MCNC: 9 G/DL (ref 12–15.9)
HGB BLD-MCNC: 9.1 G/DL (ref 12–15.9)
HGB BLDA-MCNC: 7.1 G/DL (ref 12–17)
HGB BLDA-MCNC: 8.2 G/DL (ref 12–17)
HGB BLDA-MCNC: 8.5 G/DL (ref 14–18)
HGB BLDA-MCNC: 8.5 G/DL (ref 14–18)
HGB UR QL STRIP.AUTO: NEGATIVE
HOLD SPECIMEN: NORMAL
HOLD SPECIMEN: NORMAL
HOROWITZ INDEX BLD+IHG-RTO: 35 %
HOROWITZ INDEX BLD+IHG-RTO: 44 %
HYPOCHROMIA BLD QL: NORMAL
IMM GRANULOCYTES # BLD AUTO: 0.03 10*3/MM3 (ref 0–0.05)
IMM GRANULOCYTES # BLD AUTO: 0.05 10*3/MM3 (ref 0–0.05)
IMM GRANULOCYTES # BLD AUTO: 0.06 10*3/MM3 (ref 0–0.05)
IMM GRANULOCYTES # BLD AUTO: 0.07 10*3/MM3 (ref 0–0.05)
IMM GRANULOCYTES NFR BLD AUTO: 0.4 % (ref 0–0.5)
IMM GRANULOCYTES NFR BLD AUTO: 0.5 % (ref 0–0.5)
IMM GRANULOCYTES NFR BLD AUTO: 0.6 % (ref 0–0.5)
IMM GRANULOCYTES NFR BLD AUTO: 0.6 % (ref 0–0.5)
IMM GRANULOCYTES NFR BLD AUTO: 0.7 % (ref 0–0.5)
IMM GRANULOCYTES NFR BLD AUTO: 0.9 % (ref 0–0.5)
INR PPP: 1.26 (ref 0.85–1.16)
IPAP: 14
IRON 24H UR-MRATE: 64 MCG/DL (ref 37–145)
IRON SATN MFR SERPL: 19 % (ref 20–50)
KETONES UR QL STRIP: NEGATIVE
LDLC SERPL CALC-MCNC: 93 MG/DL (ref 0–100)
LDLC/HDLC SERPL: 1.93 {RATIO}
LEUKOCYTE ESTERASE UR QL STRIP.AUTO: NEGATIVE
LV EF 2D ECHO EST: 70 %
LYMPHOCYTES # BLD AUTO: 0.5 10*3/MM3 (ref 0.7–3.1)
LYMPHOCYTES # BLD AUTO: 0.51 10*3/MM3 (ref 0.7–3.1)
LYMPHOCYTES # BLD AUTO: 0.74 10*3/MM3 (ref 0.7–3.1)
LYMPHOCYTES # BLD AUTO: 0.77 10*3/MM3 (ref 0.7–3.1)
LYMPHOCYTES # BLD AUTO: 0.81 10*3/MM3 (ref 0.7–3.1)
LYMPHOCYTES # BLD AUTO: 1.04 10*3/MM3 (ref 0.7–3.1)
LYMPHOCYTES NFR BLD AUTO: 10.1 % (ref 19.6–45.3)
LYMPHOCYTES NFR BLD AUTO: 10.4 % (ref 19.6–45.3)
LYMPHOCYTES NFR BLD AUTO: 11 % (ref 19.6–45.3)
LYMPHOCYTES NFR BLD AUTO: 5 % (ref 19.6–45.3)
LYMPHOCYTES NFR BLD AUTO: 5.1 % (ref 19.6–45.3)
LYMPHOCYTES NFR BLD AUTO: 7.4 % (ref 19.6–45.3)
Lab: NORMAL
MAGNESIUM SERPL-MCNC: 1.7 MG/DL (ref 1.6–2.4)
MAXIMAL PREDICTED HEART RATE: 138 BPM
MCH RBC QN AUTO: 21.2 PG (ref 26.6–33)
MCH RBC QN AUTO: 21.9 PG (ref 26.6–33)
MCH RBC QN AUTO: 22 PG (ref 26.6–33)
MCH RBC QN AUTO: 22.3 PG (ref 26.6–33)
MCH RBC QN AUTO: 22.3 PG (ref 26.6–33)
MCH RBC QN AUTO: 22.5 PG (ref 26.6–33)
MCH RBC QN AUTO: 22.5 PG (ref 26.6–33)
MCH RBC QN AUTO: 22.6 PG (ref 26.6–33)
MCH RBC QN AUTO: 22.6 PG (ref 26.6–33)
MCH RBC QN AUTO: 22.7 PG (ref 26.6–33)
MCH RBC QN AUTO: 22.7 PG (ref 26.6–33)
MCHC RBC AUTO-ENTMCNC: 25.8 G/DL (ref 31.5–35.7)
MCHC RBC AUTO-ENTMCNC: 26.6 G/DL (ref 31.5–35.7)
MCHC RBC AUTO-ENTMCNC: 26.8 G/DL (ref 31.5–35.7)
MCHC RBC AUTO-ENTMCNC: 26.8 G/DL (ref 31.5–35.7)
MCHC RBC AUTO-ENTMCNC: 27 G/DL (ref 31.5–35.7)
MCHC RBC AUTO-ENTMCNC: 27.2 G/DL (ref 31.5–35.7)
MCHC RBC AUTO-ENTMCNC: 27.2 G/DL (ref 31.5–35.7)
MCHC RBC AUTO-ENTMCNC: 27.6 G/DL (ref 31.5–35.7)
MCHC RBC AUTO-ENTMCNC: 27.8 G/DL (ref 31.5–35.7)
MCHC RBC AUTO-ENTMCNC: 28 G/DL (ref 31.5–35.7)
MCHC RBC AUTO-ENTMCNC: 28.7 G/DL (ref 31.5–35.7)
MCV RBC AUTO: 79.3 FL (ref 79–97)
MCV RBC AUTO: 80.9 FL (ref 79–97)
MCV RBC AUTO: 81.4 FL (ref 79–97)
MCV RBC AUTO: 81.4 FL (ref 79–97)
MCV RBC AUTO: 81.6 FL (ref 79–97)
MCV RBC AUTO: 81.9 FL (ref 79–97)
MCV RBC AUTO: 81.9 FL (ref 79–97)
MCV RBC AUTO: 82.3 FL (ref 79–97)
MCV RBC AUTO: 82.6 FL (ref 79–97)
MCV RBC AUTO: 82.8 FL (ref 79–97)
MCV RBC AUTO: 84.2 FL (ref 79–97)
METHGB BLD QL: 1.2 % (ref 0–1.5)
METHGB BLD QL: 1.4 % (ref 0–1.5)
MODALITY: ABNORMAL
MODALITY: ABNORMAL
MONOCYTES # BLD AUTO: 0.19 10*3/MM3 (ref 0.1–0.9)
MONOCYTES # BLD AUTO: 0.43 10*3/MM3 (ref 0.1–0.9)
MONOCYTES # BLD AUTO: 0.71 10*3/MM3 (ref 0.1–0.9)
MONOCYTES # BLD AUTO: 0.77 10*3/MM3 (ref 0.1–0.9)
MONOCYTES # BLD AUTO: 0.87 10*3/MM3 (ref 0.1–0.9)
MONOCYTES # BLD AUTO: 0.92 10*3/MM3 (ref 0.1–0.9)
MONOCYTES NFR BLD AUTO: 11.2 % (ref 5–12)
MONOCYTES NFR BLD AUTO: 4.2 % (ref 5–12)
MONOCYTES NFR BLD AUTO: 4.3 % (ref 5–12)
MONOCYTES NFR BLD AUTO: 6.3 % (ref 5–12)
MONOCYTES NFR BLD AUTO: 6.8 % (ref 5–12)
MONOCYTES NFR BLD AUTO: 7.5 % (ref 5–12)
MRSA DNA SPEC QL NAA+PROBE: NEGATIVE
NEGATIVE CONTROL: NEGATIVE
NEUTROPHILS # BLD AUTO: 12.89 10*3/MM3 (ref 1.7–7)
NEUTROPHILS # BLD AUTO: 3.83 10*3/MM3 (ref 1.7–7)
NEUTROPHILS # BLD AUTO: 5.74 10*3/MM3 (ref 1.7–7)
NEUTROPHILS # BLD AUTO: 8.17 10*3/MM3 (ref 1.7–7)
NEUTROPHILS # BLD AUTO: 8.58 10*3/MM3 (ref 1.7–7)
NEUTROPHILS # BLD AUTO: 8.83 10*3/MM3 (ref 1.7–7)
NEUTROPHILS NFR BLD AUTO: 73.5 % (ref 42.7–76)
NEUTROPHILS NFR BLD AUTO: 79.5 % (ref 42.7–76)
NEUTROPHILS NFR BLD AUTO: 82.8 % (ref 42.7–76)
NEUTROPHILS NFR BLD AUTO: 83.9 % (ref 42.7–76)
NEUTROPHILS NFR BLD AUTO: 87.8 % (ref 42.7–76)
NEUTROPHILS NFR BLD AUTO: 89.1 % (ref 42.7–76)
NITRITE UR QL STRIP: NEGATIVE
NOTE: ABNORMAL
NOTE: ABNORMAL
NRBC BLD AUTO-RTO: 0 /100 WBC (ref 0–0.2)
NRBC BLD AUTO-RTO: 0.2 /100 WBC (ref 0–0.2)
NRBC BLD AUTO-RTO: 0.3 /100 WBC (ref 0–0.2)
NT-PROBNP SERPL-MCNC: ABNORMAL PG/ML (ref 5–1800)
NT-PROBNP SERPL-MCNC: ABNORMAL PG/ML (ref 5–1800)
OXYHGB MFR BLDV: 93.5 % (ref 94–99)
OXYHGB MFR BLDV: 96.3 % (ref 94–99)
PCO2 BLDA: 57.5 MM HG (ref 35–45)
PCO2 BLDA: 58.2 MM HG (ref 35–45)
PCO2 TEMP ADJ BLD: 57.5 MM HG (ref 35–45)
PCO2 TEMP ADJ BLD: 58.2 MM HG (ref 35–45)
PH BLDA: 7.25 PH UNITS (ref 7.35–7.45)
PH BLDA: 7.27 PH UNITS (ref 7.35–7.45)
PH UR STRIP.AUTO: <=5 [PH] (ref 5–8)
PH, TEMP CORRECTED: 7.25 PH UNITS
PH, TEMP CORRECTED: 7.27 PH UNITS
PLAT MORPH BLD: NORMAL
PLATELET # BLD AUTO: 127 10*3/MM3 (ref 140–450)
PLATELET # BLD AUTO: 195 10*3/MM3 (ref 140–450)
PLATELET # BLD AUTO: 211 10*3/MM3 (ref 140–450)
PLATELET # BLD AUTO: 213 10*3/MM3 (ref 140–450)
PLATELET # BLD AUTO: 213 10*3/MM3 (ref 140–450)
PLATELET # BLD AUTO: 219 10*3/MM3 (ref 140–450)
PLATELET # BLD AUTO: 224 10*3/MM3 (ref 140–450)
PLATELET # BLD AUTO: 232 10*3/MM3 (ref 140–450)
PLATELET # BLD AUTO: 242 10*3/MM3 (ref 140–450)
PLATELET # BLD AUTO: 242 10*3/MM3 (ref 140–450)
PLATELET # BLD AUTO: 273 10*3/MM3 (ref 140–450)
PMV BLD AUTO: 10.1 FL (ref 6–12)
PMV BLD AUTO: 10.3 FL (ref 6–12)
PMV BLD AUTO: 10.4 FL (ref 6–12)
PMV BLD AUTO: 10.5 FL (ref 6–12)
PMV BLD AUTO: 10.6 FL (ref 6–12)
PMV BLD AUTO: 10.6 FL (ref 6–12)
PMV BLD AUTO: 10.7 FL (ref 6–12)
PMV BLD AUTO: 11.1 FL (ref 6–12)
PMV BLD AUTO: 11.1 FL (ref 6–12)
PMV BLD AUTO: 11.3 FL (ref 6–12)
PMV BLD AUTO: 11.5 FL (ref 6–12)
PO2 BLDA: 134 MM HG (ref 83–108)
PO2 BLDA: 79.6 MM HG (ref 83–108)
PO2 TEMP ADJ BLD: 134 MM HG (ref 83–108)
PO2 TEMP ADJ BLD: 79.6 MM HG (ref 83–108)
POSITIVE CONTROL: POSITIVE
POTASSIUM BLD-SCNC: 3.9 MMOL/L (ref 3.5–5.2)
POTASSIUM BLD-SCNC: 4.1 MMOL/L (ref 3.5–5.2)
POTASSIUM BLD-SCNC: 4.2 MMOL/L (ref 3.5–5.2)
POTASSIUM BLD-SCNC: 4.3 MMOL/L (ref 3.5–5.2)
POTASSIUM BLD-SCNC: 4.6 MMOL/L (ref 3.5–5.2)
POTASSIUM BLD-SCNC: 4.8 MMOL/L (ref 3.5–5.2)
POTASSIUM BLD-SCNC: 4.9 MMOL/L (ref 3.5–5.2)
POTASSIUM BLD-SCNC: 4.9 MMOL/L (ref 3.5–5.2)
POTASSIUM BLD-SCNC: 5.2 MMOL/L (ref 3.5–5.2)
POTASSIUM BLD-SCNC: 5.6 MMOL/L (ref 3.5–5.2)
POTASSIUM BLDA-SCNC: 5.4 MMOL/L (ref 3.5–4.9)
POTASSIUM BLDA-SCNC: 7.1 MMOL/L (ref 3.5–4.9)
PROCALCITONIN SERPL-MCNC: 0.16 NG/ML (ref 0.1–0.25)
PROCALCITONIN SERPL-MCNC: 0.2 NG/ML (ref 0.1–0.25)
PROT SERPL-MCNC: 6.4 G/DL (ref 6–8.5)
PROT SERPL-MCNC: 6.7 G/DL (ref 6–8.5)
PROT SERPL-MCNC: 6.9 G/DL (ref 6–8.5)
PROT SERPL-MCNC: 7 G/DL (ref 6–8.5)
PROT UR QL STRIP: ABNORMAL
PROTHROMBIN TIME: 15.2 SECONDS (ref 11.2–14.3)
RBC # BLD AUTO: 2.93 10*6/MM3 (ref 3.77–5.28)
RBC # BLD AUTO: 3.34 10*6/MM3 (ref 3.77–5.28)
RBC # BLD AUTO: 3.52 10*6/MM3 (ref 3.77–5.28)
RBC # BLD AUTO: 3.54 10*6/MM3 (ref 3.77–5.28)
RBC # BLD AUTO: 3.64 10*6/MM3 (ref 3.77–5.28)
RBC # BLD AUTO: 3.73 10*6/MM3 (ref 3.77–5.28)
RBC # BLD AUTO: 3.73 10*6/MM3 (ref 3.77–5.28)
RBC # BLD AUTO: 3.88 10*6/MM3 (ref 3.77–5.28)
RBC # BLD AUTO: 3.95 10*6/MM3 (ref 3.77–5.28)
RBC # BLD AUTO: 3.99 10*6/MM3 (ref 3.77–5.28)
RBC # BLD AUTO: 4.08 10*6/MM3 (ref 3.77–5.28)
RETICS # AUTO: 0.06 10*6/MM3 (ref 0.02–0.13)
RETICS/RBC NFR AUTO: 1.72 % (ref 0.7–1.9)
RH BLD: POSITIVE
RIGHT ARM BP: NORMAL MMHG
SET MECH RESP RATE: 14
SODIUM BLD-SCNC: 141 MMOL/L (ref 136–145)
SODIUM BLD-SCNC: 142 MMOL/L (ref 136–145)
SODIUM BLD-SCNC: 143 MMOL/L (ref 136–145)
SODIUM BLD-SCNC: 144 MMOL/L (ref 136–145)
SODIUM BLD-SCNC: 145 MMOL/L (ref 136–145)
SODIUM BLD-SCNC: 145 MMOL/L (ref 136–145)
SODIUM BLD-SCNC: 146 MMOL/L (ref 136–145)
SODIUM BLDA-SCNC: 137 MMOL/L (ref 138–146)
SODIUM BLDA-SCNC: 141 MMOL/L (ref 138–146)
SP GR UR STRIP: 1.02 (ref 1–1.03)
STRESS TARGET HR: 117 BPM
T&S EXPIRATION DATE: NORMAL
TARGETS BLD QL SMEAR: NORMAL
TIBC SERPL-MCNC: 337 MCG/DL (ref 298–536)
TOTAL RATE: 23 BREATHS/MINUTE
TRANSFERRIN SERPL-MCNC: 226 MG/DL (ref 200–360)
TRIGL SERPL-MCNC: 96 MG/DL (ref 0–150)
TROPONIN T SERPL-MCNC: 0.01 NG/ML (ref 0–0.03)
TROPONIN T SERPL-MCNC: 0.02 NG/ML (ref 0–0.03)
TROPONIN T SERPL-MCNC: 0.03 NG/ML (ref 0–0.03)
UNIT  ABO: NORMAL
UNIT  ABO: NORMAL
UNIT  RH: NORMAL
UNIT  RH: NORMAL
UROBILINOGEN UR QL STRIP: ABNORMAL
VENTILATOR MODE: ABNORMAL
VENTILATOR MODE: ABNORMAL
VIT B12 BLD-MCNC: 271 PG/ML (ref 211–946)
VLDLC SERPL-MCNC: 19.2 MG/DL
WBC MORPH BLD: NORMAL
WBC NRBC COR # BLD: 10.28 10*3/MM3 (ref 3.4–10.8)
WBC NRBC COR # BLD: 10.37 10*3/MM3 (ref 3.4–10.8)
WBC NRBC COR # BLD: 14.68 10*3/MM3 (ref 3.4–10.8)
WBC NRBC COR # BLD: 4.56 10*3/MM3 (ref 3.4–10.8)
WBC NRBC COR # BLD: 4.68 10*3/MM3 (ref 3.4–10.8)
WBC NRBC COR # BLD: 5.08 10*3/MM3 (ref 3.4–10.8)
WBC NRBC COR # BLD: 7.57 10*3/MM3 (ref 3.4–10.8)
WBC NRBC COR # BLD: 7.8 10*3/MM3 (ref 3.4–10.8)
WBC NRBC COR # BLD: 8.05 10*3/MM3 (ref 3.4–10.8)
WBC NRBC COR # BLD: 9.55 10*3/MM3 (ref 3.4–10.8)
WBC NRBC COR # BLD: 9.92 10*3/MM3 (ref 3.4–10.8)
WHOLE BLOOD HOLD SPECIMEN: NORMAL
WHOLE BLOOD HOLD SPECIMEN: NORMAL

## 2019-01-01 PROCEDURE — 80053 COMPREHEN METABOLIC PANEL: CPT | Performed by: INTERNAL MEDICINE

## 2019-01-01 PROCEDURE — 97530 THERAPEUTIC ACTIVITIES: CPT

## 2019-01-01 PROCEDURE — 97110 THERAPEUTIC EXERCISES: CPT

## 2019-01-01 PROCEDURE — 25010000002 ONDANSETRON PER 1 MG: Performed by: PHYSICIAN ASSISTANT

## 2019-01-01 PROCEDURE — 82962 GLUCOSE BLOOD TEST: CPT

## 2019-01-01 PROCEDURE — 80048 BASIC METABOLIC PNL TOTAL CA: CPT | Performed by: EMERGENCY MEDICINE

## 2019-01-01 PROCEDURE — 84484 ASSAY OF TROPONIN QUANT: CPT | Performed by: EMERGENCY MEDICINE

## 2019-01-01 PROCEDURE — 92610 EVALUATE SWALLOWING FUNCTION: CPT

## 2019-01-01 PROCEDURE — 86900 BLOOD TYPING SEROLOGIC ABO: CPT | Performed by: EMERGENCY MEDICINE

## 2019-01-01 PROCEDURE — 36430 TRANSFUSION BLD/BLD COMPNT: CPT

## 2019-01-01 PROCEDURE — 25010000002 NA FERRIC GLUC CPLX PER 12.5 MG: Performed by: PHYSICIAN ASSISTANT

## 2019-01-01 PROCEDURE — 63710000001 PREDNISONE PER 1 MG: Performed by: INTERNAL MEDICINE

## 2019-01-01 PROCEDURE — 93306 TTE W/DOPPLER COMPLETE: CPT

## 2019-01-01 PROCEDURE — 71045 X-RAY EXAM CHEST 1 VIEW: CPT

## 2019-01-01 PROCEDURE — 99233 SBSQ HOSP IP/OBS HIGH 50: CPT | Performed by: INTERNAL MEDICINE

## 2019-01-01 PROCEDURE — 84450 TRANSFERASE (AST) (SGOT): CPT | Performed by: EMERGENCY MEDICINE

## 2019-01-01 PROCEDURE — 94799 UNLISTED PULMONARY SVC/PX: CPT

## 2019-01-01 PROCEDURE — 85027 COMPLETE CBC AUTOMATED: CPT | Performed by: NURSE PRACTITIONER

## 2019-01-01 PROCEDURE — 85018 HEMOGLOBIN: CPT | Performed by: EMERGENCY MEDICINE

## 2019-01-01 PROCEDURE — 99291 CRITICAL CARE FIRST HOUR: CPT | Performed by: NURSE PRACTITIONER

## 2019-01-01 PROCEDURE — 93010 ELECTROCARDIOGRAM REPORT: CPT | Performed by: INTERNAL MEDICINE

## 2019-01-01 PROCEDURE — 97165 OT EVAL LOW COMPLEX 30 MIN: CPT

## 2019-01-01 PROCEDURE — 93005 ELECTROCARDIOGRAM TRACING: CPT | Performed by: EMERGENCY MEDICINE

## 2019-01-01 PROCEDURE — 70551 MRI BRAIN STEM W/O DYE: CPT

## 2019-01-01 PROCEDURE — 25010000002 FUROSEMIDE PER 20 MG: Performed by: INTERNAL MEDICINE

## 2019-01-01 PROCEDURE — 85014 HEMATOCRIT: CPT | Performed by: INTERNAL MEDICINE

## 2019-01-01 PROCEDURE — 0 IOPAMIDOL PER 1 ML: Performed by: INTERNAL MEDICINE

## 2019-01-01 PROCEDURE — 85025 COMPLETE CBC W/AUTO DIFF WBC: CPT | Performed by: PHYSICIAN ASSISTANT

## 2019-01-01 PROCEDURE — 80048 BASIC METABOLIC PNL TOTAL CA: CPT | Performed by: INTERNAL MEDICINE

## 2019-01-01 PROCEDURE — 82805 BLOOD GASES W/O2 SATURATION: CPT

## 2019-01-01 PROCEDURE — 85025 COMPLETE CBC W/AUTO DIFF WBC: CPT | Performed by: NURSE PRACTITIONER

## 2019-01-01 PROCEDURE — 85027 COMPLETE CBC AUTOMATED: CPT | Performed by: INTERNAL MEDICINE

## 2019-01-01 PROCEDURE — 99214 OFFICE O/P EST MOD 30 MIN: CPT | Performed by: FAMILY MEDICINE

## 2019-01-01 PROCEDURE — 81003 URINALYSIS AUTO W/O SCOPE: CPT | Performed by: NURSE PRACTITIONER

## 2019-01-01 PROCEDURE — 99496 TRANSJ CARE MGMT HIGH F2F 7D: CPT | Performed by: FAMILY MEDICINE

## 2019-01-01 PROCEDURE — 99221 1ST HOSP IP/OBS SF/LOW 40: CPT | Performed by: PHYSICIAN ASSISTANT

## 2019-01-01 PROCEDURE — 36600 WITHDRAWAL OF ARTERIAL BLOOD: CPT

## 2019-01-01 PROCEDURE — 0042T HC CT CEREBRAL PERFUSION W/WO CONTRAST: CPT

## 2019-01-01 PROCEDURE — 80053 COMPREHEN METABOLIC PANEL: CPT | Performed by: NURSE PRACTITIONER

## 2019-01-01 PROCEDURE — 74230 X-RAY XM SWLNG FUNCJ C+: CPT

## 2019-01-01 PROCEDURE — 25010000002 PIPERACILLIN SOD-TAZOBACTAM PER 1 G: Performed by: NURSE PRACTITIONER

## 2019-01-01 PROCEDURE — 84145 PROCALCITONIN (PCT): CPT | Performed by: NURSE PRACTITIONER

## 2019-01-01 PROCEDURE — 63710000001 INSULIN LISPRO (HUMAN) PER 5 UNITS: Performed by: PHYSICIAN ASSISTANT

## 2019-01-01 PROCEDURE — 93005 ELECTROCARDIOGRAM TRACING: CPT | Performed by: INTERNAL MEDICINE

## 2019-01-01 PROCEDURE — 86900 BLOOD TYPING SEROLOGIC ABO: CPT

## 2019-01-01 PROCEDURE — 93880 EXTRACRANIAL BILAT STUDY: CPT

## 2019-01-01 PROCEDURE — 97163 PT EVAL HIGH COMPLEX 45 MIN: CPT

## 2019-01-01 PROCEDURE — 70498 CT ANGIOGRAPHY NECK: CPT

## 2019-01-01 PROCEDURE — 25010000002 FUROSEMIDE PER 20 MG: Performed by: NURSE PRACTITIONER

## 2019-01-01 PROCEDURE — 70496 CT ANGIOGRAPHY HEAD: CPT

## 2019-01-01 PROCEDURE — 97116 GAIT TRAINING THERAPY: CPT

## 2019-01-01 PROCEDURE — P9016 RBC LEUKOCYTES REDUCED: HCPCS

## 2019-01-01 PROCEDURE — 85025 COMPLETE CBC W/AUTO DIFF WBC: CPT | Performed by: INTERNAL MEDICINE

## 2019-01-01 PROCEDURE — 83605 ASSAY OF LACTIC ACID: CPT | Performed by: PHYSICIAN ASSISTANT

## 2019-01-01 PROCEDURE — 82272 OCCULT BLD FECES 1-3 TESTS: CPT | Performed by: FAMILY MEDICINE

## 2019-01-01 PROCEDURE — 70450 CT HEAD/BRAIN W/O DYE: CPT

## 2019-01-01 PROCEDURE — 86901 BLOOD TYPING SEROLOGIC RH(D): CPT | Performed by: EMERGENCY MEDICINE

## 2019-01-01 PROCEDURE — 94660 CPAP INITIATION&MGMT: CPT

## 2019-01-01 PROCEDURE — 84460 ALANINE AMINO (ALT) (SGPT): CPT | Performed by: EMERGENCY MEDICINE

## 2019-01-01 PROCEDURE — 87641 MR-STAPH DNA AMP PROBE: CPT

## 2019-01-01 PROCEDURE — 80053 COMPREHEN METABOLIC PANEL: CPT | Performed by: PHYSICIAN ASSISTANT

## 2019-01-01 PROCEDURE — 85014 HEMATOCRIT: CPT | Performed by: EMERGENCY MEDICINE

## 2019-01-01 PROCEDURE — 84466 ASSAY OF TRANSFERRIN: CPT | Performed by: PHYSICIAN ASSISTANT

## 2019-01-01 PROCEDURE — 80048 BASIC METABOLIC PNL TOTAL CA: CPT | Performed by: PHYSICIAN ASSISTANT

## 2019-01-01 PROCEDURE — 83540 ASSAY OF IRON: CPT | Performed by: PHYSICIAN ASSISTANT

## 2019-01-01 PROCEDURE — 87040 BLOOD CULTURE FOR BACTERIA: CPT | Performed by: FAMILY MEDICINE

## 2019-01-01 PROCEDURE — 93005 ELECTROCARDIOGRAM TRACING: CPT | Performed by: PHYSICIAN ASSISTANT

## 2019-01-01 PROCEDURE — 83036 HEMOGLOBIN GLYCOSYLATED A1C: CPT | Performed by: PHYSICIAN ASSISTANT

## 2019-01-01 PROCEDURE — 99223 1ST HOSP IP/OBS HIGH 75: CPT | Performed by: INTERNAL MEDICINE

## 2019-01-01 PROCEDURE — 85610 PROTHROMBIN TIME: CPT | Performed by: FAMILY MEDICINE

## 2019-01-01 PROCEDURE — 94640 AIRWAY INHALATION TREATMENT: CPT

## 2019-01-01 PROCEDURE — 85045 AUTOMATED RETICULOCYTE COUNT: CPT | Performed by: PHYSICIAN ASSISTANT

## 2019-01-01 PROCEDURE — 80061 LIPID PANEL: CPT | Performed by: PHYSICIAN ASSISTANT

## 2019-01-01 PROCEDURE — 83735 ASSAY OF MAGNESIUM: CPT | Performed by: NURSE PRACTITIONER

## 2019-01-01 PROCEDURE — 84145 PROCALCITONIN (PCT): CPT | Performed by: FAMILY MEDICINE

## 2019-01-01 PROCEDURE — 83880 ASSAY OF NATRIURETIC PEPTIDE: CPT | Performed by: NURSE PRACTITIONER

## 2019-01-01 PROCEDURE — 84484 ASSAY OF TROPONIN QUANT: CPT | Performed by: NURSE PRACTITIONER

## 2019-01-01 PROCEDURE — 85730 THROMBOPLASTIN TIME PARTIAL: CPT | Performed by: EMERGENCY MEDICINE

## 2019-01-01 PROCEDURE — 85018 HEMOGLOBIN: CPT | Performed by: INTERNAL MEDICINE

## 2019-01-01 PROCEDURE — 85014 HEMATOCRIT: CPT

## 2019-01-01 PROCEDURE — 82728 ASSAY OF FERRITIN: CPT | Performed by: PHYSICIAN ASSISTANT

## 2019-01-01 PROCEDURE — 85007 BL SMEAR W/DIFF WBC COUNT: CPT | Performed by: INTERNAL MEDICINE

## 2019-01-01 PROCEDURE — 86920 COMPATIBILITY TEST SPIN: CPT

## 2019-01-01 PROCEDURE — 85025 COMPLETE CBC W/AUTO DIFF WBC: CPT | Performed by: EMERGENCY MEDICINE

## 2019-01-01 PROCEDURE — 83880 ASSAY OF NATRIURETIC PEPTIDE: CPT | Performed by: PHYSICIAN ASSISTANT

## 2019-01-01 PROCEDURE — 82746 ASSAY OF FOLIC ACID SERUM: CPT | Performed by: PHYSICIAN ASSISTANT

## 2019-01-01 PROCEDURE — 83605 ASSAY OF LACTIC ACID: CPT | Performed by: NURSE PRACTITIONER

## 2019-01-01 PROCEDURE — 25010000002 VANCOMYCIN PER 500 MG

## 2019-01-01 PROCEDURE — 99285 EMERGENCY DEPT VISIT HI MDM: CPT

## 2019-01-01 PROCEDURE — 85027 COMPLETE CBC AUTOMATED: CPT | Performed by: PHYSICIAN ASSISTANT

## 2019-01-01 PROCEDURE — 99239 HOSP IP/OBS DSCHRG MGMT >30: CPT | Performed by: INTERNAL MEDICINE

## 2019-01-01 PROCEDURE — 99232 SBSQ HOSP IP/OBS MODERATE 35: CPT | Performed by: INTERNAL MEDICINE

## 2019-01-01 PROCEDURE — 71046 X-RAY EXAM CHEST 2 VIEWS: CPT

## 2019-01-01 PROCEDURE — 82607 VITAMIN B-12: CPT | Performed by: PHYSICIAN ASSISTANT

## 2019-01-01 PROCEDURE — 80047 BASIC METABLC PNL IONIZED CA: CPT

## 2019-01-01 PROCEDURE — 85007 BL SMEAR W/DIFF WBC COUNT: CPT | Performed by: EMERGENCY MEDICINE

## 2019-01-01 PROCEDURE — 74240 X-RAY XM UPR GI TRC 1CNTRST: CPT

## 2019-01-01 PROCEDURE — 92611 MOTION FLUOROSCOPY/SWALLOW: CPT

## 2019-01-01 PROCEDURE — 93306 TTE W/DOPPLER COMPLETE: CPT | Performed by: INTERNAL MEDICINE

## 2019-01-01 PROCEDURE — 83605 ASSAY OF LACTIC ACID: CPT | Performed by: FAMILY MEDICINE

## 2019-01-01 PROCEDURE — 87040 BLOOD CULTURE FOR BACTERIA: CPT | Performed by: NURSE PRACTITIONER

## 2019-01-01 PROCEDURE — 86850 RBC ANTIBODY SCREEN: CPT | Performed by: EMERGENCY MEDICINE

## 2019-01-01 PROCEDURE — 82270 OCCULT BLOOD FECES: CPT | Performed by: EMERGENCY MEDICINE

## 2019-01-01 RX ORDER — ONDANSETRON 2 MG/ML
4 INJECTION INTRAMUSCULAR; INTRAVENOUS EVERY 6 HOURS PRN
Status: DISCONTINUED | OUTPATIENT
Start: 2019-01-01 | End: 2019-01-01 | Stop reason: HOSPADM

## 2019-01-01 RX ORDER — LISINOPRIL AND HYDROCHLOROTHIAZIDE 25; 20 MG/1; MG/1
1 TABLET ORAL DAILY
Qty: 90 TABLET | Refills: 1 | Status: SHIPPED | OUTPATIENT
Start: 2019-01-01 | End: 2019-01-01 | Stop reason: HOSPADM

## 2019-01-01 RX ORDER — PANTOPRAZOLE SODIUM 40 MG/1
40 TABLET, DELAYED RELEASE ORAL
Status: DISCONTINUED | OUTPATIENT
Start: 2019-01-01 | End: 2019-01-01 | Stop reason: HOSPADM

## 2019-01-01 RX ORDER — CLOPIDOGREL BISULFATE 75 MG/1
75 TABLET ORAL NIGHTLY
Qty: 90 TABLET | Refills: 1 | Status: SHIPPED | OUTPATIENT
Start: 2019-01-01 | End: 2019-01-01 | Stop reason: SDUPTHER

## 2019-01-01 RX ORDER — FUROSEMIDE 10 MG/ML
20 INJECTION INTRAMUSCULAR; INTRAVENOUS DAILY
Status: DISCONTINUED | OUTPATIENT
Start: 2019-01-01 | End: 2019-01-01

## 2019-01-01 RX ORDER — DEXTROSE AND SODIUM CHLORIDE 5; .9 G/100ML; G/100ML
50 INJECTION, SOLUTION INTRAVENOUS CONTINUOUS
Status: DISCONTINUED | OUTPATIENT
Start: 2019-01-01 | End: 2019-01-01 | Stop reason: HOSPADM

## 2019-01-01 RX ORDER — TETRAHYDROZOLINE HCL 0.05 %
1 DROPS OPHTHALMIC (EYE) 4 TIMES DAILY PRN
Status: DISCONTINUED | OUTPATIENT
Start: 2019-01-01 | End: 2019-01-01 | Stop reason: HOSPADM

## 2019-01-01 RX ORDER — SODIUM CHLORIDE 0.9 % (FLUSH) 0.9 %
10 SYRINGE (ML) INJECTION AS NEEDED
Status: DISCONTINUED | OUTPATIENT
Start: 2019-01-01 | End: 2019-01-01 | Stop reason: HOSPADM

## 2019-01-01 RX ORDER — GABAPENTIN 300 MG/1
300 CAPSULE ORAL EVERY 8 HOURS SCHEDULED
Status: DISCONTINUED | OUTPATIENT
Start: 2019-01-01 | End: 2019-01-01 | Stop reason: HOSPADM

## 2019-01-01 RX ORDER — SODIUM CHLORIDE 0.9 % (FLUSH) 0.9 %
10 SYRINGE (ML) INJECTION EVERY 12 HOURS SCHEDULED
Status: DISCONTINUED | OUTPATIENT
Start: 2019-01-01 | End: 2019-01-01

## 2019-01-01 RX ORDER — GABAPENTIN 600 MG/1
600 TABLET ORAL 3 TIMES DAILY
Qty: 270 TABLET | Refills: 1 | Status: ON HOLD | OUTPATIENT
Start: 2019-01-01 | End: 2019-01-01 | Stop reason: SDUPTHER

## 2019-01-01 RX ORDER — FUROSEMIDE 10 MG/ML
20 INJECTION INTRAMUSCULAR; INTRAVENOUS ONCE
Status: COMPLETED | OUTPATIENT
Start: 2019-01-01 | End: 2019-01-01

## 2019-01-01 RX ORDER — AMOXICILLIN AND CLAVULANATE POTASSIUM 600; 42.9 MG/5ML; MG/5ML
600 POWDER, FOR SUSPENSION ORAL EVERY 12 HOURS SCHEDULED
Qty: 25 ML | Refills: 0 | Status: SHIPPED | OUTPATIENT
Start: 2019-01-01 | End: 2019-01-01

## 2019-01-01 RX ORDER — LEVOFLOXACIN 750 MG/1
750 TABLET ORAL DAILY
Qty: 5 TABLET | Refills: 0 | Status: SHIPPED | OUTPATIENT
Start: 2019-01-01 | End: 2019-01-01

## 2019-01-01 RX ORDER — FUROSEMIDE 20 MG/1
10 TABLET ORAL DAILY
Qty: 15 TABLET | Refills: 0 | Status: SHIPPED | OUTPATIENT
Start: 2019-01-01

## 2019-01-01 RX ORDER — DICYCLOMINE HYDROCHLORIDE 10 MG/1
CAPSULE ORAL
Qty: 360 CAPSULE | Refills: 0 | Status: SHIPPED | OUTPATIENT
Start: 2019-01-01 | End: 2019-01-01 | Stop reason: SDUPTHER

## 2019-01-01 RX ORDER — CLOPIDOGREL BISULFATE 75 MG/1
75 TABLET ORAL
Qty: 90 TABLET | Refills: 0 | Status: SHIPPED | OUTPATIENT
Start: 2019-01-01

## 2019-01-01 RX ORDER — GABAPENTIN 600 MG/1
600 TABLET ORAL 3 TIMES DAILY
Qty: 270 TABLET | Refills: 1 | Status: SHIPPED | OUTPATIENT
Start: 2019-01-01 | End: 2019-01-01 | Stop reason: SDUPTHER

## 2019-01-01 RX ORDER — ESOMEPRAZOLE MAGNESIUM 40 MG/1
40 CAPSULE, DELAYED RELEASE ORAL 2 TIMES DAILY
Qty: 60 CAPSULE | Refills: 0 | Status: SHIPPED | OUTPATIENT
Start: 2019-01-01

## 2019-01-01 RX ORDER — VANCOMYCIN HYDROCHLORIDE 1 G/200ML
1000 INJECTION, SOLUTION INTRAVENOUS ONCE
Status: COMPLETED | OUTPATIENT
Start: 2019-01-01 | End: 2019-01-01

## 2019-01-01 RX ORDER — ASPIRIN 81 MG/1
81 TABLET, CHEWABLE ORAL DAILY
Qty: 30 TABLET | Refills: 1 | Status: SHIPPED | OUTPATIENT
Start: 2019-01-01

## 2019-01-01 RX ORDER — PREDNISONE 20 MG/1
40 TABLET ORAL
Status: DISCONTINUED | OUTPATIENT
Start: 2019-01-01 | End: 2019-01-01 | Stop reason: HOSPADM

## 2019-01-01 RX ORDER — ASPIRIN 300 MG/1
325 SUPPOSITORY RECTAL ONCE
Status: COMPLETED | OUTPATIENT
Start: 2019-01-01 | End: 2019-01-01

## 2019-01-01 RX ORDER — GABAPENTIN 600 MG/1
600 TABLET ORAL 3 TIMES DAILY
Qty: 270 TABLET | Refills: 0 | Status: SHIPPED | OUTPATIENT
Start: 2019-01-01

## 2019-01-01 RX ORDER — ATORVASTATIN CALCIUM 80 MG/1
80 TABLET, FILM COATED ORAL NIGHTLY
Qty: 30 TABLET | Refills: 1 | Status: SHIPPED | OUTPATIENT
Start: 2019-01-01

## 2019-01-01 RX ORDER — CETIRIZINE HYDROCHLORIDE 10 MG/1
10 TABLET ORAL NIGHTLY
Status: DISCONTINUED | OUTPATIENT
Start: 2019-01-01 | End: 2019-01-01 | Stop reason: HOSPADM

## 2019-01-01 RX ORDER — HYDROCODONE BITARTRATE AND ACETAMINOPHEN 7.5; 325 MG/1; MG/1
1 TABLET ORAL EVERY 6 HOURS PRN
Status: DISCONTINUED | OUTPATIENT
Start: 2019-01-01 | End: 2019-01-01 | Stop reason: SDUPTHER

## 2019-01-01 RX ORDER — IPRATROPIUM BROMIDE AND ALBUTEROL SULFATE 2.5; .5 MG/3ML; MG/3ML
3 SOLUTION RESPIRATORY (INHALATION) EVERY 6 HOURS PRN
Status: DISCONTINUED | OUTPATIENT
Start: 2019-01-01 | End: 2019-01-01 | Stop reason: HOSPADM

## 2019-01-01 RX ORDER — GABAPENTIN 600 MG/1
300 TABLET ORAL 3 TIMES DAILY
Qty: 270 TABLET | Refills: 1
Start: 2019-01-01 | End: 2019-01-01 | Stop reason: SDUPTHER

## 2019-01-01 RX ORDER — DOCUSATE SODIUM 50 MG/5 ML
100 LIQUID (ML) ORAL 2 TIMES DAILY
Status: DISCONTINUED | OUTPATIENT
Start: 2019-01-01 | End: 2019-01-01 | Stop reason: HOSPADM

## 2019-01-01 RX ORDER — PANTOPRAZOLE SODIUM 40 MG/10ML
40 INJECTION, POWDER, LYOPHILIZED, FOR SOLUTION INTRAVENOUS
Status: DISCONTINUED | OUTPATIENT
Start: 2019-01-01 | End: 2019-01-01

## 2019-01-01 RX ORDER — L.ACID,PARA/B.BIFIDUM/S.THERM 8B CELL
1 CAPSULE ORAL DAILY
Status: DISCONTINUED | OUTPATIENT
Start: 2019-01-01 | End: 2019-01-01 | Stop reason: HOSPADM

## 2019-01-01 RX ORDER — GUAIFENESIN 200 MG/1
400 TABLET ORAL EVERY 8 HOURS
Status: DISCONTINUED | OUTPATIENT
Start: 2019-01-01 | End: 2019-01-01 | Stop reason: HOSPADM

## 2019-01-01 RX ORDER — FUROSEMIDE 20 MG/1
20 TABLET ORAL DAILY
Status: DISCONTINUED | OUTPATIENT
Start: 2019-01-01 | End: 2019-01-01 | Stop reason: HOSPADM

## 2019-01-01 RX ORDER — LISINOPRIL AND HYDROCHLOROTHIAZIDE 25; 20 MG/1; MG/1
TABLET ORAL
Qty: 90 TABLET | Refills: 1 | OUTPATIENT
Start: 2019-01-01

## 2019-01-01 RX ORDER — ATORVASTATIN CALCIUM 40 MG/1
80 TABLET, FILM COATED ORAL NIGHTLY
Status: DISCONTINUED | OUTPATIENT
Start: 2019-01-01 | End: 2019-01-01 | Stop reason: HOSPADM

## 2019-01-01 RX ORDER — CLOPIDOGREL BISULFATE 75 MG/1
75 TABLET ORAL DAILY
Status: DISCONTINUED | OUTPATIENT
Start: 2019-01-01 | End: 2019-01-01 | Stop reason: HOSPADM

## 2019-01-01 RX ORDER — GUAIFENESIN 600 MG/1
600 TABLET, EXTENDED RELEASE ORAL EVERY 12 HOURS SCHEDULED
Status: DISCONTINUED | OUTPATIENT
Start: 2019-01-01 | End: 2019-01-01 | Stop reason: CLARIF

## 2019-01-01 RX ORDER — DICYCLOMINE HYDROCHLORIDE 10 MG/1
CAPSULE ORAL
Qty: 120 CAPSULE | Refills: 2 | Status: SHIPPED | OUTPATIENT
Start: 2019-01-01 | End: 2019-01-01 | Stop reason: SDUPTHER

## 2019-01-01 RX ORDER — CLOPIDOGREL BISULFATE 75 MG/1
TABLET ORAL
Qty: 90 TABLET | Refills: 0 | Status: SHIPPED | OUTPATIENT
Start: 2019-01-01 | End: 2019-01-01 | Stop reason: SDUPTHER

## 2019-01-01 RX ORDER — ALBUTEROL SULFATE 90 UG/1
AEROSOL, METERED RESPIRATORY (INHALATION)
Qty: 36 INHALER | Refills: 2 | Status: SHIPPED | OUTPATIENT
Start: 2019-01-01 | End: 2019-01-01 | Stop reason: SDUPTHER

## 2019-01-01 RX ORDER — ALBUTEROL SULFATE 90 UG/1
AEROSOL, METERED RESPIRATORY (INHALATION)
Qty: 18 INHALER | Refills: 0 | Status: SHIPPED | OUTPATIENT
Start: 2019-01-01 | End: 2019-01-01 | Stop reason: SDUPTHER

## 2019-01-01 RX ORDER — DOCUSATE SODIUM 50 MG/5 ML
100 LIQUID (ML) ORAL 2 TIMES DAILY
Status: DISCONTINUED | OUTPATIENT
Start: 2019-01-01 | End: 2019-01-01

## 2019-01-01 RX ORDER — ONDANSETRON 4 MG/1
4 TABLET, FILM COATED ORAL EVERY 6 HOURS PRN
Status: DISCONTINUED | OUTPATIENT
Start: 2019-01-01 | End: 2019-01-01 | Stop reason: HOSPADM

## 2019-01-01 RX ORDER — METOPROLOL TARTRATE 5 MG/5ML
2.5 INJECTION INTRAVENOUS ONCE
Status: COMPLETED | OUTPATIENT
Start: 2019-01-01 | End: 2019-01-01

## 2019-01-01 RX ORDER — PANTOPRAZOLE SODIUM 40 MG/10ML
40 INJECTION, POWDER, LYOPHILIZED, FOR SOLUTION INTRAVENOUS EVERY 12 HOURS SCHEDULED
Status: DISCONTINUED | OUTPATIENT
Start: 2019-01-01 | End: 2019-01-01

## 2019-01-01 RX ORDER — HYDROCODONE BITARTRATE AND ACETAMINOPHEN 7.5; 325 MG/1; MG/1
1 TABLET ORAL EVERY 6 HOURS PRN
Status: DISCONTINUED | OUTPATIENT
Start: 2019-01-01 | End: 2019-01-01 | Stop reason: HOSPADM

## 2019-01-01 RX ORDER — DEXTROSE MONOHYDRATE 25 G/50ML
25 INJECTION, SOLUTION INTRAVENOUS
Status: DISCONTINUED | OUTPATIENT
Start: 2019-01-01 | End: 2019-01-01 | Stop reason: HOSPADM

## 2019-01-01 RX ORDER — HYDROCODONE BITARTRATE AND ACETAMINOPHEN 7.5; 325 MG/1; MG/1
1 TABLET ORAL EVERY 6 HOURS PRN
COMMUNITY

## 2019-01-01 RX ORDER — MONTELUKAST SODIUM 10 MG/1
10 TABLET ORAL NIGHTLY
Status: DISCONTINUED | OUTPATIENT
Start: 2019-01-01 | End: 2019-01-01 | Stop reason: HOSPADM

## 2019-01-01 RX ORDER — L.ACID,PARA/B.BIFIDUM/S.THERM 8B CELL
1 CAPSULE ORAL DAILY
Qty: 30 CAPSULE | Refills: 0 | Status: SHIPPED | OUTPATIENT
Start: 2019-01-01

## 2019-01-01 RX ORDER — ASPIRIN 81 MG/1
81 TABLET, CHEWABLE ORAL DAILY
Status: DISCONTINUED | OUTPATIENT
Start: 2019-01-01 | End: 2019-01-01

## 2019-01-01 RX ORDER — ACLIDINIUM BROMIDE 400 UG/1
POWDER, METERED RESPIRATORY (INHALATION)
Qty: 3 EACH | Refills: 1 | OUTPATIENT
Start: 2019-01-01

## 2019-01-01 RX ORDER — AMOXICILLIN AND CLAVULANATE POTASSIUM 600; 42.9 MG/5ML; MG/5ML
600 POWDER, FOR SUSPENSION ORAL EVERY 12 HOURS SCHEDULED
Status: DISCONTINUED | OUTPATIENT
Start: 2019-01-01 | End: 2019-01-01 | Stop reason: HOSPADM

## 2019-01-01 RX ORDER — NITROGLYCERIN 0.4 MG/1
0.4 TABLET SUBLINGUAL
Status: DISCONTINUED | OUTPATIENT
Start: 2019-01-01 | End: 2019-01-01 | Stop reason: HOSPADM

## 2019-01-01 RX ORDER — SODIUM CHLORIDE 9 MG/ML
50 INJECTION, SOLUTION INTRAVENOUS CONTINUOUS
Status: DISCONTINUED | OUTPATIENT
Start: 2019-01-01 | End: 2019-01-01

## 2019-01-01 RX ORDER — ALBUTEROL SULFATE 90 UG/1
AEROSOL, METERED RESPIRATORY (INHALATION)
Qty: 36 INHALER | Refills: 2 | Status: SHIPPED | OUTPATIENT
Start: 2019-01-01

## 2019-01-01 RX ORDER — ALBUTEROL SULFATE 90 UG/1
AEROSOL, METERED RESPIRATORY (INHALATION)
Qty: 1 INHALER | Refills: 0 | Status: SHIPPED | OUTPATIENT
Start: 2019-01-01 | End: 2019-01-01 | Stop reason: SDUPTHER

## 2019-01-01 RX ORDER — PREDNISONE 20 MG/1
40 TABLET ORAL
Qty: 6 TABLET | Refills: 0 | Status: SHIPPED | OUTPATIENT
Start: 2019-01-01 | End: 2019-01-01

## 2019-01-01 RX ORDER — CASTOR OIL AND BALSAM, PERU 788; 87 MG/G; MG/G
OINTMENT TOPICAL EVERY 12 HOURS SCHEDULED
Status: DISCONTINUED | OUTPATIENT
Start: 2019-01-01 | End: 2019-01-01 | Stop reason: HOSPADM

## 2019-01-01 RX ORDER — FUROSEMIDE 10 MG/ML
20 SOLUTION ORAL ONCE
Status: DISCONTINUED | OUTPATIENT
Start: 2019-01-01 | End: 2019-01-01

## 2019-01-01 RX ORDER — GABAPENTIN 600 MG/1
TABLET ORAL
Qty: 270 TABLET | Refills: 1 | OUTPATIENT
Start: 2019-01-01

## 2019-01-01 RX ORDER — BUDESONIDE AND FORMOTEROL FUMARATE DIHYDRATE 160; 4.5 UG/1; UG/1
2 AEROSOL RESPIRATORY (INHALATION)
Status: DISCONTINUED | OUTPATIENT
Start: 2019-01-01 | End: 2019-01-01 | Stop reason: HOSPADM

## 2019-01-01 RX ORDER — IPRATROPIUM BROMIDE AND ALBUTEROL SULFATE 2.5; .5 MG/3ML; MG/3ML
3 SOLUTION RESPIRATORY (INHALATION)
Status: DISCONTINUED | OUTPATIENT
Start: 2019-01-01 | End: 2019-01-01 | Stop reason: HOSPADM

## 2019-01-01 RX ORDER — NICOTINE POLACRILEX 4 MG
15 LOZENGE BUCCAL
Status: DISCONTINUED | OUTPATIENT
Start: 2019-01-01 | End: 2019-01-01 | Stop reason: HOSPADM

## 2019-01-01 RX ORDER — ASPIRIN 81 MG/1
324 TABLET, CHEWABLE ORAL ONCE
Status: DISCONTINUED | OUTPATIENT
Start: 2019-01-01 | End: 2019-01-01

## 2019-01-01 RX ORDER — SODIUM CHLORIDE 9 MG/ML
75 INJECTION, SOLUTION INTRAVENOUS CONTINUOUS
Status: DISCONTINUED | OUTPATIENT
Start: 2019-01-01 | End: 2019-01-01

## 2019-01-01 RX ORDER — ATORVASTATIN CALCIUM 80 MG/1
80 TABLET, FILM COATED ORAL NIGHTLY
Qty: 30 TABLET | Refills: 1 | Status: SHIPPED | OUTPATIENT
Start: 2019-01-01 | End: 2019-01-01 | Stop reason: SDUPTHER

## 2019-01-01 RX ORDER — PREDNISONE 20 MG/1
40 TABLET ORAL
Status: DISCONTINUED | OUTPATIENT
Start: 2019-01-01 | End: 2019-01-01

## 2019-01-01 RX ORDER — LISINOPRIL AND HYDROCHLOROTHIAZIDE 25; 20 MG/1; MG/1
TABLET ORAL
Qty: 14 TABLET | Refills: 0 | Status: SHIPPED | OUTPATIENT
Start: 2019-01-01 | End: 2019-01-01 | Stop reason: SDUPTHER

## 2019-01-01 RX ORDER — ACETAMINOPHEN 650 MG/1
650 SUPPOSITORY RECTAL EVERY 4 HOURS PRN
Status: DISCONTINUED | OUTPATIENT
Start: 2019-01-01 | End: 2019-01-01 | Stop reason: HOSPADM

## 2019-01-01 RX ORDER — ASPIRIN 81 MG/1
81 TABLET, CHEWABLE ORAL DAILY
Status: DISCONTINUED | OUTPATIENT
Start: 2019-01-01 | End: 2019-01-01 | Stop reason: HOSPADM

## 2019-01-01 RX ORDER — DOCUSATE SODIUM 100 MG/1
100 CAPSULE, LIQUID FILLED ORAL 2 TIMES DAILY
Status: DISCONTINUED | OUTPATIENT
Start: 2019-01-01 | End: 2019-01-01 | Stop reason: CLARIF

## 2019-01-01 RX ORDER — DOXYCYCLINE 100 MG/1
100 TABLET ORAL 2 TIMES DAILY
Qty: 20 TABLET | Refills: 0 | Status: SHIPPED | OUTPATIENT
Start: 2019-01-01 | End: 2019-01-01

## 2019-01-01 RX ORDER — ACLIDINIUM BROMIDE 400 UG/1
POWDER, METERED RESPIRATORY (INHALATION)
Qty: 3 EACH | Refills: 1 | Status: SHIPPED | OUTPATIENT
Start: 2019-01-01 | End: 2019-01-01 | Stop reason: SDUPTHER

## 2019-01-01 RX ORDER — ACETAMINOPHEN 325 MG/1
650 TABLET ORAL EVERY 4 HOURS PRN
Status: DISCONTINUED | OUTPATIENT
Start: 2019-01-01 | End: 2019-01-01 | Stop reason: HOSPADM

## 2019-01-01 RX ADMIN — GABAPENTIN 300 MG: 300 CAPSULE ORAL at 21:20

## 2019-01-01 RX ADMIN — IPRATROPIUM BROMIDE AND ALBUTEROL SULFATE 3 ML: 2.5; .5 SOLUTION RESPIRATORY (INHALATION) at 13:04

## 2019-01-01 RX ADMIN — IPRATROPIUM BROMIDE AND ALBUTEROL SULFATE 3 ML: 2.5; .5 SOLUTION RESPIRATORY (INHALATION) at 21:17

## 2019-01-01 RX ADMIN — INSULIN LISPRO 3 UNITS: 100 INJECTION, SOLUTION INTRAVENOUS; SUBCUTANEOUS at 21:28

## 2019-01-01 RX ADMIN — IPRATROPIUM BROMIDE AND ALBUTEROL SULFATE 3 ML: 2.5; .5 SOLUTION RESPIRATORY (INHALATION) at 19:57

## 2019-01-01 RX ADMIN — NITROGLYCERIN 0.4 MG: 0.4 TABLET SUBLINGUAL at 16:30

## 2019-01-01 RX ADMIN — ASPIRIN 81 MG 81 MG: 81 TABLET ORAL at 09:48

## 2019-01-01 RX ADMIN — CETIRIZINE HYDROCHLORIDE 10 MG: 10 TABLET, FILM COATED ORAL at 22:14

## 2019-01-01 RX ADMIN — CASTOR OIL AND BALSAM, PERU: 788; 87 OINTMENT TOPICAL at 08:58

## 2019-01-01 RX ADMIN — PANTOPRAZOLE SODIUM 40 MG: 40 TABLET, DELAYED RELEASE ORAL at 16:02

## 2019-01-01 RX ADMIN — IPRATROPIUM BROMIDE AND ALBUTEROL SULFATE 3 ML: 2.5; .5 SOLUTION RESPIRATORY (INHALATION) at 07:50

## 2019-01-01 RX ADMIN — PREDNISONE 40 MG: 20 TABLET ORAL at 14:09

## 2019-01-01 RX ADMIN — CASTOR OIL AND BALSAM, PERU: 788; 87 OINTMENT TOPICAL at 23:03

## 2019-01-01 RX ADMIN — PANTOPRAZOLE SODIUM 40 MG: 40 INJECTION, POWDER, FOR SOLUTION INTRAVENOUS at 18:01

## 2019-01-01 RX ADMIN — BUDESONIDE AND FORMOTEROL FUMARATE DIHYDRATE 2 PUFF: 160; 4.5 AEROSOL RESPIRATORY (INHALATION) at 22:34

## 2019-01-01 RX ADMIN — INSULIN LISPRO 2 UNITS: 100 INJECTION, SOLUTION INTRAVENOUS; SUBCUTANEOUS at 18:43

## 2019-01-01 RX ADMIN — ONDANSETRON HYDROCHLORIDE 4 MG: 2 INJECTION, SOLUTION INTRAMUSCULAR; INTRAVENOUS at 20:55

## 2019-01-01 RX ADMIN — AMOXICILLIN AND CLAVULANATE POTASSIUM 600 MG: 600; 42.9 SUSPENSION ORAL at 09:07

## 2019-01-01 RX ADMIN — IPRATROPIUM BROMIDE AND ALBUTEROL SULFATE 3 ML: 2.5; .5 SOLUTION RESPIRATORY (INHALATION) at 11:49

## 2019-01-01 RX ADMIN — IPRATROPIUM BROMIDE AND ALBUTEROL SULFATE 3 ML: 2.5; .5 SOLUTION RESPIRATORY (INHALATION) at 08:13

## 2019-01-01 RX ADMIN — CLOPIDOGREL BISULFATE 75 MG: 75 TABLET ORAL at 09:06

## 2019-01-01 RX ADMIN — IPRATROPIUM BROMIDE AND ALBUTEROL SULFATE 3 ML: 2.5; .5 SOLUTION RESPIRATORY (INHALATION) at 12:19

## 2019-01-01 RX ADMIN — BUDESONIDE AND FORMOTEROL FUMARATE DIHYDRATE 2 PUFF: 160; 4.5 AEROSOL RESPIRATORY (INHALATION) at 21:22

## 2019-01-01 RX ADMIN — BUDESONIDE AND FORMOTEROL FUMARATE DIHYDRATE 2 PUFF: 160; 4.5 AEROSOL RESPIRATORY (INHALATION) at 20:44

## 2019-01-01 RX ADMIN — PANTOPRAZOLE SODIUM 40 MG: 40 INJECTION, POWDER, FOR SOLUTION INTRAVENOUS at 06:34

## 2019-01-01 RX ADMIN — BUDESONIDE AND FORMOTEROL FUMARATE DIHYDRATE 2 PUFF: 160; 4.5 AEROSOL RESPIRATORY (INHALATION) at 19:46

## 2019-01-01 RX ADMIN — ASPIRIN 81 MG 81 MG: 81 TABLET ORAL at 09:47

## 2019-01-01 RX ADMIN — DOCUSATE SODIUM 100 MG: 50 LIQUID ORAL at 21:03

## 2019-01-01 RX ADMIN — CLOPIDOGREL BISULFATE 75 MG: 75 TABLET ORAL at 09:57

## 2019-01-01 RX ADMIN — AMOXICILLIN AND CLAVULANATE POTASSIUM 600 MG: 600; 42.9 SUSPENSION ORAL at 21:02

## 2019-01-01 RX ADMIN — SODIUM CHLORIDE 50 ML/HR: 9 INJECTION, SOLUTION INTRAVENOUS at 23:11

## 2019-01-01 RX ADMIN — IPRATROPIUM BROMIDE AND ALBUTEROL SULFATE 3 ML: 2.5; .5 SOLUTION RESPIRATORY (INHALATION) at 15:43

## 2019-01-01 RX ADMIN — CASTOR OIL AND BALSAM, PERU: 788; 87 OINTMENT TOPICAL at 11:35

## 2019-01-01 RX ADMIN — MONTELUKAST SODIUM 10 MG: 10 TABLET, COATED ORAL at 21:52

## 2019-01-01 RX ADMIN — PIPERACILLIN SODIUM AND TAZOBACTAM SODIUM 3.38 G: 3; .375 INJECTION, POWDER, LYOPHILIZED, FOR SOLUTION INTRAVENOUS at 22:12

## 2019-01-01 RX ADMIN — DOCUSATE SODIUM 100 MG: 50 LIQUID ORAL at 22:14

## 2019-01-01 RX ADMIN — ASPIRIN 81 MG 81 MG: 81 TABLET ORAL at 09:06

## 2019-01-01 RX ADMIN — CLOPIDOGREL BISULFATE 75 MG: 75 TABLET ORAL at 18:42

## 2019-01-01 RX ADMIN — PANTOPRAZOLE SODIUM 40 MG: 40 TABLET, DELAYED RELEASE ORAL at 06:28

## 2019-01-01 RX ADMIN — PANTOPRAZOLE SODIUM 40 MG: 40 INJECTION, POWDER, FOR SOLUTION INTRAVENOUS at 18:38

## 2019-01-01 RX ADMIN — HYDROCODONE BITARTRATE AND ACETAMINOPHEN 1 TABLET: 7.5; 325 TABLET ORAL at 09:47

## 2019-01-01 RX ADMIN — IPRATROPIUM BROMIDE AND ALBUTEROL SULFATE 3 ML: 2.5; .5 SOLUTION RESPIRATORY (INHALATION) at 07:38

## 2019-01-01 RX ADMIN — CASTOR OIL AND BALSAM, PERU: 788; 87 OINTMENT TOPICAL at 09:47

## 2019-01-01 RX ADMIN — DOCUSATE SODIUM 100 MG: 100 CAPSULE, LIQUID FILLED ORAL at 21:20

## 2019-01-01 RX ADMIN — FUROSEMIDE 20 MG: 20 TABLET ORAL at 16:01

## 2019-01-01 RX ADMIN — IPRATROPIUM BROMIDE AND ALBUTEROL SULFATE 3 ML: 2.5; .5 SOLUTION RESPIRATORY (INHALATION) at 16:48

## 2019-01-01 RX ADMIN — BUDESONIDE AND FORMOTEROL FUMARATE DIHYDRATE 2 PUFF: 160; 4.5 AEROSOL RESPIRATORY (INHALATION) at 20:06

## 2019-01-01 RX ADMIN — INSULIN LISPRO 2 UNITS: 100 INJECTION, SOLUTION INTRAVENOUS; SUBCUTANEOUS at 21:21

## 2019-01-01 RX ADMIN — AMOXICILLIN AND CLAVULANATE POTASSIUM 600 MG: 600; 42.9 SUSPENSION ORAL at 08:57

## 2019-01-01 RX ADMIN — SODIUM CHLORIDE, PRESERVATIVE FREE 10 ML: 5 INJECTION INTRAVENOUS at 11:35

## 2019-01-01 RX ADMIN — GUAIFENESIN 400 MG: 200 TABLET ORAL at 06:28

## 2019-01-01 RX ADMIN — ASPIRIN 81 MG 81 MG: 81 TABLET ORAL at 10:29

## 2019-01-01 RX ADMIN — ATORVASTATIN CALCIUM 80 MG: 40 TABLET, FILM COATED ORAL at 21:20

## 2019-01-01 RX ADMIN — SODIUM CHLORIDE 75 ML/HR: 9 INJECTION, SOLUTION INTRAVENOUS at 22:04

## 2019-01-01 RX ADMIN — HYDROCODONE BITARTRATE AND ACETAMINOPHEN 1 TABLET: 7.5; 325 TABLET ORAL at 19:42

## 2019-01-01 RX ADMIN — FUROSEMIDE 20 MG: 10 INJECTION, SOLUTION INTRAMUSCULAR; INTRAVENOUS at 12:30

## 2019-01-01 RX ADMIN — CASTOR OIL AND BALSAM, PERU: 788; 87 OINTMENT TOPICAL at 18:42

## 2019-01-01 RX ADMIN — ACETAMINOPHEN 650 MG: 650 SUPPOSITORY RECTAL at 06:43

## 2019-01-01 RX ADMIN — PIPERACILLIN SODIUM AND TAZOBACTAM SODIUM 3.38 G: 3; .375 INJECTION, POWDER, LYOPHILIZED, FOR SOLUTION INTRAVENOUS at 04:22

## 2019-01-01 RX ADMIN — Medication 1 CAPSULE: at 08:57

## 2019-01-01 RX ADMIN — PANTOPRAZOLE SODIUM 40 MG: 40 INJECTION, POWDER, FOR SOLUTION INTRAVENOUS at 09:07

## 2019-01-01 RX ADMIN — IPRATROPIUM BROMIDE AND ALBUTEROL SULFATE 3 ML: 2.5; .5 SOLUTION RESPIRATORY (INHALATION) at 12:31

## 2019-01-01 RX ADMIN — GUAIFENESIN 400 MG: 200 TABLET ORAL at 21:28

## 2019-01-01 RX ADMIN — SODIUM CHLORIDE, PRESERVATIVE FREE 10 ML: 5 INJECTION INTRAVENOUS at 21:52

## 2019-01-01 RX ADMIN — CASTOR OIL AND BALSAM, PERU: 788; 87 OINTMENT TOPICAL at 21:03

## 2019-01-01 RX ADMIN — PIPERACILLIN SODIUM AND TAZOBACTAM SODIUM 3.38 G: 3; .375 INJECTION, POWDER, LYOPHILIZED, FOR SOLUTION INTRAVENOUS at 20:53

## 2019-01-01 RX ADMIN — PANTOPRAZOLE SODIUM 40 MG: 40 INJECTION, POWDER, FOR SOLUTION INTRAVENOUS at 07:33

## 2019-01-01 RX ADMIN — SODIUM CHLORIDE, PRESERVATIVE FREE 10 ML: 5 INJECTION INTRAVENOUS at 23:03

## 2019-01-01 RX ADMIN — DOCUSATE SODIUM 100 MG: 50 LIQUID ORAL at 09:20

## 2019-01-01 RX ADMIN — CETIRIZINE HYDROCHLORIDE 10 MG: 10 TABLET, FILM COATED ORAL at 21:03

## 2019-01-01 RX ADMIN — BARIUM SULFATE 20 ML: 400 PASTE ORAL at 13:05

## 2019-01-01 RX ADMIN — GABAPENTIN 300 MG: 300 CAPSULE ORAL at 21:28

## 2019-01-01 RX ADMIN — GABAPENTIN 300 MG: 300 CAPSULE ORAL at 22:16

## 2019-01-01 RX ADMIN — ASPIRIN 81 MG 81 MG: 81 TABLET ORAL at 09:57

## 2019-01-01 RX ADMIN — IOPAMIDOL 115 ML: 755 INJECTION, SOLUTION INTRAVENOUS at 23:23

## 2019-01-01 RX ADMIN — AMOXICILLIN AND CLAVULANATE POTASSIUM 600 MG: 600; 42.9 SUSPENSION ORAL at 09:19

## 2019-01-01 RX ADMIN — ASPIRIN 81 MG 81 MG: 81 TABLET ORAL at 09:21

## 2019-01-01 RX ADMIN — GABAPENTIN 300 MG: 300 CAPSULE ORAL at 06:08

## 2019-01-01 RX ADMIN — DOCUSATE SODIUM 100 MG: 100 CAPSULE, LIQUID FILLED ORAL at 21:52

## 2019-01-01 RX ADMIN — DOCUSATE SODIUM 100 MG: 50 LIQUID ORAL at 21:29

## 2019-01-01 RX ADMIN — INSULIN LISPRO 2 UNITS: 100 INJECTION, SOLUTION INTRAVENOUS; SUBCUTANEOUS at 12:16

## 2019-01-01 RX ADMIN — PANTOPRAZOLE SODIUM 40 MG: 40 INJECTION, POWDER, FOR SOLUTION INTRAVENOUS at 22:32

## 2019-01-01 RX ADMIN — IPRATROPIUM BROMIDE AND ALBUTEROL SULFATE 3 ML: 2.5; .5 SOLUTION RESPIRATORY (INHALATION) at 16:01

## 2019-01-01 RX ADMIN — HYDROCODONE BITARTRATE AND ACETAMINOPHEN 1 TABLET: 7.5; 325 TABLET ORAL at 23:30

## 2019-01-01 RX ADMIN — DOCUSATE SODIUM 100 MG: 100 CAPSULE, LIQUID FILLED ORAL at 21:21

## 2019-01-01 RX ADMIN — IPRATROPIUM BROMIDE AND ALBUTEROL SULFATE 3 ML: 2.5; .5 SOLUTION RESPIRATORY (INHALATION) at 20:08

## 2019-01-01 RX ADMIN — GUAIFENESIN 400 MG: 200 TABLET ORAL at 05:42

## 2019-01-01 RX ADMIN — GUAIFENESIN 400 MG: 200 TABLET ORAL at 14:09

## 2019-01-01 RX ADMIN — ONDANSETRON HYDROCHLORIDE 4 MG: 4 TABLET, FILM COATED ORAL at 14:54

## 2019-01-01 RX ADMIN — GUAIFENESIN 600 MG: 600 TABLET, EXTENDED RELEASE ORAL at 09:48

## 2019-01-01 RX ADMIN — DOCUSATE SODIUM 100 MG: 100 CAPSULE, LIQUID FILLED ORAL at 09:48

## 2019-01-01 RX ADMIN — BUDESONIDE AND FORMOTEROL FUMARATE DIHYDRATE 2 PUFF: 160; 4.5 AEROSOL RESPIRATORY (INHALATION) at 07:19

## 2019-01-01 RX ADMIN — PREDNISONE 40 MG: 20 TABLET ORAL at 08:57

## 2019-01-01 RX ADMIN — BUDESONIDE AND FORMOTEROL FUMARATE DIHYDRATE 2 PUFF: 160; 4.5 AEROSOL RESPIRATORY (INHALATION) at 20:08

## 2019-01-01 RX ADMIN — PREDNISONE 40 MG: 20 TABLET ORAL at 09:20

## 2019-01-01 RX ADMIN — GABAPENTIN 300 MG: 300 CAPSULE ORAL at 22:11

## 2019-01-01 RX ADMIN — GABAPENTIN 300 MG: 300 CAPSULE ORAL at 15:18

## 2019-01-01 RX ADMIN — IPRATROPIUM BROMIDE AND ALBUTEROL SULFATE 3 ML: 2.5; .5 SOLUTION RESPIRATORY (INHALATION) at 07:33

## 2019-01-01 RX ADMIN — GABAPENTIN 300 MG: 300 CAPSULE ORAL at 05:49

## 2019-01-01 RX ADMIN — MONTELUKAST SODIUM 10 MG: 10 TABLET, COATED ORAL at 21:19

## 2019-01-01 RX ADMIN — GUAIFENESIN 400 MG: 200 TABLET ORAL at 22:16

## 2019-01-01 RX ADMIN — IPRATROPIUM BROMIDE AND ALBUTEROL SULFATE 3 ML: 2.5; .5 SOLUTION RESPIRATORY (INHALATION) at 08:49

## 2019-01-01 RX ADMIN — DOCUSATE SODIUM 100 MG: 100 CAPSULE, LIQUID FILLED ORAL at 09:07

## 2019-01-01 RX ADMIN — DOCUSATE SODIUM 100 MG: 50 LIQUID ORAL at 22:12

## 2019-01-01 RX ADMIN — BARIUM SULFATE 100 ML: 0.81 POWDER, FOR SUSPENSION ORAL at 13:05

## 2019-01-01 RX ADMIN — ONDANSETRON HYDROCHLORIDE 4 MG: 2 INJECTION, SOLUTION INTRAMUSCULAR; INTRAVENOUS at 02:45

## 2019-01-01 RX ADMIN — GABAPENTIN 300 MG: 300 CAPSULE ORAL at 14:08

## 2019-01-01 RX ADMIN — SODIUM CHLORIDE, PRESERVATIVE FREE 10 ML: 5 INJECTION INTRAVENOUS at 13:25

## 2019-01-01 RX ADMIN — PANTOPRAZOLE SODIUM 40 MG: 40 INJECTION, POWDER, FOR SOLUTION INTRAVENOUS at 07:43

## 2019-01-01 RX ADMIN — MONTELUKAST SODIUM 10 MG: 10 TABLET, COATED ORAL at 21:28

## 2019-01-01 RX ADMIN — INSULIN LISPRO 2 UNITS: 100 INJECTION, SOLUTION INTRAVENOUS; SUBCUTANEOUS at 12:34

## 2019-01-01 RX ADMIN — Medication 1 CAPSULE: at 12:31

## 2019-01-01 RX ADMIN — ONDANSETRON HYDROCHLORIDE 4 MG: 4 TABLET, FILM COATED ORAL at 13:25

## 2019-01-01 RX ADMIN — DOCUSATE SODIUM 100 MG: 50 LIQUID ORAL at 12:30

## 2019-01-01 RX ADMIN — CASTOR OIL AND BALSAM, PERU 1 EACH: 788; 87 OINTMENT TOPICAL at 09:57

## 2019-01-01 RX ADMIN — ATORVASTATIN CALCIUM 80 MG: 40 TABLET, FILM COATED ORAL at 21:28

## 2019-01-01 RX ADMIN — ATORVASTATIN CALCIUM 80 MG: 40 TABLET, FILM COATED ORAL at 21:03

## 2019-01-01 RX ADMIN — ATORVASTATIN CALCIUM 80 MG: 40 TABLET, FILM COATED ORAL at 21:52

## 2019-01-01 RX ADMIN — HYDROCODONE BITARTRATE AND ACETAMINOPHEN 1 TABLET: 7.5; 325 TABLET ORAL at 18:26

## 2019-01-01 RX ADMIN — SODIUM CHLORIDE, PRESERVATIVE FREE 10 ML: 5 INJECTION INTRAVENOUS at 20:53

## 2019-01-01 RX ADMIN — FUROSEMIDE 20 MG: 10 INJECTION, SOLUTION INTRAMUSCULAR; INTRAVENOUS at 06:34

## 2019-01-01 RX ADMIN — PANTOPRAZOLE SODIUM 40 MG: 40 INJECTION, POWDER, FOR SOLUTION INTRAVENOUS at 06:09

## 2019-01-01 RX ADMIN — ASPIRIN 81 MG 81 MG: 81 TABLET ORAL at 08:57

## 2019-01-01 RX ADMIN — GUAIFENESIN 600 MG: 600 TABLET, EXTENDED RELEASE ORAL at 20:54

## 2019-01-01 RX ADMIN — ASPIRIN 81 MG 81 MG: 81 TABLET ORAL at 12:31

## 2019-01-01 RX ADMIN — SODIUM CHLORIDE, PRESERVATIVE FREE 10 ML: 5 INJECTION INTRAVENOUS at 21:23

## 2019-01-01 RX ADMIN — CLOPIDOGREL BISULFATE 75 MG: 75 TABLET ORAL at 08:57

## 2019-01-01 RX ADMIN — GABAPENTIN 300 MG: 300 CAPSULE ORAL at 14:54

## 2019-01-01 RX ADMIN — IPRATROPIUM BROMIDE AND ALBUTEROL SULFATE 3 ML: 2.5; .5 SOLUTION RESPIRATORY (INHALATION) at 20:06

## 2019-01-01 RX ADMIN — ACETAMINOPHEN 650 MG: 650 SUPPOSITORY RECTAL at 04:59

## 2019-01-01 RX ADMIN — CASTOR OIL AND BALSAM, PERU: 788; 87 OINTMENT TOPICAL at 22:43

## 2019-01-01 RX ADMIN — IPRATROPIUM BROMIDE AND ALBUTEROL SULFATE 3 ML: 2.5; .5 SOLUTION RESPIRATORY (INHALATION) at 07:31

## 2019-01-01 RX ADMIN — HYDROCODONE BITARTRATE AND ACETAMINOPHEN 1 TABLET: 7.5; 325 TABLET ORAL at 05:42

## 2019-01-01 RX ADMIN — CASTOR OIL AND BALSAM, PERU: 788; 87 OINTMENT TOPICAL at 22:15

## 2019-01-01 RX ADMIN — PANTOPRAZOLE SODIUM 40 MG: 40 INJECTION, POWDER, FOR SOLUTION INTRAVENOUS at 05:49

## 2019-01-01 RX ADMIN — BUDESONIDE AND FORMOTEROL FUMARATE DIHYDRATE 2 PUFF: 160; 4.5 AEROSOL RESPIRATORY (INHALATION) at 19:38

## 2019-01-01 RX ADMIN — BUDESONIDE AND FORMOTEROL FUMARATE DIHYDRATE 2 PUFF: 160; 4.5 AEROSOL RESPIRATORY (INHALATION) at 07:49

## 2019-01-01 RX ADMIN — GABAPENTIN 300 MG: 300 CAPSULE ORAL at 06:28

## 2019-01-01 RX ADMIN — INSULIN LISPRO 3 UNITS: 100 INJECTION, SOLUTION INTRAVENOUS; SUBCUTANEOUS at 22:18

## 2019-01-01 RX ADMIN — HYDROCODONE BITARTRATE AND ACETAMINOPHEN 1 TABLET: 7.5; 325 TABLET ORAL at 14:59

## 2019-01-01 RX ADMIN — PANTOPRAZOLE SODIUM 40 MG: 40 INJECTION, POWDER, FOR SOLUTION INTRAVENOUS at 06:37

## 2019-01-01 RX ADMIN — IPRATROPIUM BROMIDE AND ALBUTEROL SULFATE 3 ML: 2.5; .5 SOLUTION RESPIRATORY (INHALATION) at 16:21

## 2019-01-01 RX ADMIN — CASTOR OIL AND BALSAM, PERU: 788; 87 OINTMENT TOPICAL at 21:51

## 2019-01-01 RX ADMIN — GABAPENTIN 300 MG: 300 CAPSULE ORAL at 05:42

## 2019-01-01 RX ADMIN — IPRATROPIUM BROMIDE AND ALBUTEROL SULFATE 3 ML: 2.5; .5 SOLUTION RESPIRATORY (INHALATION) at 11:36

## 2019-01-01 RX ADMIN — AMOXICILLIN AND CLAVULANATE POTASSIUM 600 MG: 600; 42.9 SUSPENSION ORAL at 22:13

## 2019-01-01 RX ADMIN — CETIRIZINE HYDROCHLORIDE 10 MG: 10 TABLET, FILM COATED ORAL at 21:21

## 2019-01-01 RX ADMIN — IPRATROPIUM BROMIDE AND ALBUTEROL SULFATE 3 ML: 2.5; .5 SOLUTION RESPIRATORY (INHALATION) at 12:15

## 2019-01-01 RX ADMIN — GUAIFENESIN 600 MG: 600 TABLET, EXTENDED RELEASE ORAL at 13:25

## 2019-01-01 RX ADMIN — GUAIFENESIN 400 MG: 200 TABLET ORAL at 21:03

## 2019-01-01 RX ADMIN — CLOPIDOGREL BISULFATE 75 MG: 75 TABLET ORAL at 09:20

## 2019-01-01 RX ADMIN — MONTELUKAST SODIUM 10 MG: 10 TABLET, COATED ORAL at 22:11

## 2019-01-01 RX ADMIN — PIPERACILLIN SODIUM AND TAZOBACTAM SODIUM 3.38 G: 3; .375 INJECTION, POWDER, LYOPHILIZED, FOR SOLUTION INTRAVENOUS at 12:43

## 2019-01-01 RX ADMIN — BUDESONIDE AND FORMOTEROL FUMARATE DIHYDRATE 2 PUFF: 160; 4.5 AEROSOL RESPIRATORY (INHALATION) at 07:38

## 2019-01-01 RX ADMIN — METOPROLOL TARTRATE 2.5 MG: 1 INJECTION, SOLUTION INTRAVENOUS at 22:30

## 2019-01-01 RX ADMIN — GUAIFENESIN 400 MG: 200 TABLET ORAL at 18:26

## 2019-01-01 RX ADMIN — PANTOPRAZOLE SODIUM 40 MG: 40 INJECTION, POWDER, FOR SOLUTION INTRAVENOUS at 22:00

## 2019-01-01 RX ADMIN — BUDESONIDE AND FORMOTEROL FUMARATE DIHYDRATE 2 PUFF: 160; 4.5 AEROSOL RESPIRATORY (INHALATION) at 19:57

## 2019-01-01 RX ADMIN — ACETAMINOPHEN 650 MG: 650 SUPPOSITORY RECTAL at 00:14

## 2019-01-01 RX ADMIN — CASTOR OIL AND BALSAM, PERU: 788; 87 OINTMENT TOPICAL at 21:55

## 2019-01-01 RX ADMIN — DOCUSATE SODIUM 100 MG: 50 LIQUID ORAL at 08:57

## 2019-01-01 RX ADMIN — SODIUM CHLORIDE, PRESERVATIVE FREE 10 ML: 5 INJECTION INTRAVENOUS at 09:57

## 2019-01-01 RX ADMIN — SODIUM CHLORIDE, PRESERVATIVE FREE 10 ML: 5 INJECTION INTRAVENOUS at 09:21

## 2019-01-01 RX ADMIN — IPRATROPIUM BROMIDE AND ALBUTEROL SULFATE 3 ML: 2.5; .5 SOLUTION RESPIRATORY (INHALATION) at 20:44

## 2019-01-01 RX ADMIN — ATORVASTATIN CALCIUM 80 MG: 40 TABLET, FILM COATED ORAL at 22:15

## 2019-01-01 RX ADMIN — CASTOR OIL AND BALSAM, PERU: 788; 87 OINTMENT TOPICAL at 13:58

## 2019-01-01 RX ADMIN — CLOPIDOGREL BISULFATE 75 MG: 75 TABLET ORAL at 12:31

## 2019-01-01 RX ADMIN — BARIUM SULFATE 100 ML: 0.81 POWDER, FOR SUSPENSION ORAL at 13:07

## 2019-01-01 RX ADMIN — PIPERACILLIN SODIUM AND TAZOBACTAM SODIUM 3.38 G: 3; .375 INJECTION, POWDER, LYOPHILIZED, FOR SOLUTION INTRAVENOUS at 12:29

## 2019-01-01 RX ADMIN — SODIUM CHLORIDE 75 ML/HR: 9 INJECTION, SOLUTION INTRAVENOUS at 03:53

## 2019-01-01 RX ADMIN — ATORVASTATIN CALCIUM 80 MG: 40 TABLET, FILM COATED ORAL at 22:11

## 2019-01-01 RX ADMIN — INSULIN LISPRO 2 UNITS: 100 INJECTION, SOLUTION INTRAVENOUS; SUBCUTANEOUS at 18:10

## 2019-01-01 RX ADMIN — IPRATROPIUM BROMIDE AND ALBUTEROL SULFATE 3 ML: 2.5; .5 SOLUTION RESPIRATORY (INHALATION) at 05:04

## 2019-01-01 RX ADMIN — IPRATROPIUM BROMIDE AND ALBUTEROL SULFATE 3 ML: 2.5; .5 SOLUTION RESPIRATORY (INHALATION) at 22:34

## 2019-01-01 RX ADMIN — Medication 1 CAPSULE: at 09:20

## 2019-01-01 RX ADMIN — GUAIFENESIN 400 MG: 200 TABLET ORAL at 22:12

## 2019-01-01 RX ADMIN — DOCUSATE SODIUM 100 MG: 50 LIQUID ORAL at 10:28

## 2019-01-01 RX ADMIN — HYDROCODONE BITARTRATE AND ACETAMINOPHEN 1 TABLET: 7.5; 325 TABLET ORAL at 14:54

## 2019-01-01 RX ADMIN — MONTELUKAST SODIUM 10 MG: 10 TABLET, COATED ORAL at 21:03

## 2019-01-01 RX ADMIN — DOCUSATE SODIUM 100 MG: 50 LIQUID ORAL at 09:07

## 2019-01-01 RX ADMIN — PANTOPRAZOLE SODIUM 40 MG: 40 INJECTION, POWDER, FOR SOLUTION INTRAVENOUS at 20:53

## 2019-01-01 RX ADMIN — PANTOPRAZOLE SODIUM 40 MG: 40 INJECTION, POWDER, FOR SOLUTION INTRAVENOUS at 17:27

## 2019-01-01 RX ADMIN — GABAPENTIN 300 MG: 300 CAPSULE ORAL at 10:21

## 2019-01-01 RX ADMIN — CASTOR OIL AND BALSAM, PERU: 788; 87 OINTMENT TOPICAL at 15:01

## 2019-01-01 RX ADMIN — FUROSEMIDE 20 MG: 20 TABLET ORAL at 09:20

## 2019-01-01 RX ADMIN — PIPERACILLIN SODIUM AND TAZOBACTAM SODIUM 3.38 G: 3; .375 INJECTION, POWDER, LYOPHILIZED, FOR SOLUTION INTRAVENOUS at 05:17

## 2019-01-01 RX ADMIN — CETIRIZINE HYDROCHLORIDE 10 MG: 10 TABLET, FILM COATED ORAL at 21:52

## 2019-01-01 RX ADMIN — CLOPIDOGREL BISULFATE 75 MG: 75 TABLET ORAL at 09:47

## 2019-01-01 RX ADMIN — CASTOR OIL AND BALSAM, PERU: 788; 87 OINTMENT TOPICAL at 22:11

## 2019-01-01 RX ADMIN — BUDESONIDE AND FORMOTEROL FUMARATE DIHYDRATE 2 PUFF: 160; 4.5 AEROSOL RESPIRATORY (INHALATION) at 08:13

## 2019-01-01 RX ADMIN — Medication 1 CAPSULE: at 10:29

## 2019-01-01 RX ADMIN — CETIRIZINE HYDROCHLORIDE 10 MG: 10 TABLET, FILM COATED ORAL at 21:28

## 2019-01-01 RX ADMIN — ASPIRIN 300 MG: 300 SUPPOSITORY RECTAL at 04:59

## 2019-01-01 RX ADMIN — IPRATROPIUM BROMIDE AND ALBUTEROL SULFATE 3 ML: 2.5; .5 SOLUTION RESPIRATORY (INHALATION) at 17:34

## 2019-01-01 RX ADMIN — Medication 1 CAPSULE: at 09:48

## 2019-01-01 RX ADMIN — Medication 1 CAPSULE: at 09:06

## 2019-01-01 RX ADMIN — SODIUM CHLORIDE 125 MG: 9 INJECTION, SOLUTION INTRAVENOUS at 23:52

## 2019-01-01 RX ADMIN — GUAIFENESIN 400 MG: 200 TABLET ORAL at 16:01

## 2019-01-01 RX ADMIN — CASTOR OIL AND BALSAM, PERU: 788; 87 OINTMENT TOPICAL at 10:20

## 2019-01-01 RX ADMIN — GUAIFENESIN 400 MG: 200 TABLET ORAL at 14:54

## 2019-01-01 RX ADMIN — GABAPENTIN 300 MG: 300 CAPSULE ORAL at 05:17

## 2019-01-01 RX ADMIN — PANTOPRAZOLE SODIUM 40 MG: 40 INJECTION, POWDER, FOR SOLUTION INTRAVENOUS at 17:52

## 2019-01-01 RX ADMIN — HYDROCODONE BITARTRATE AND ACETAMINOPHEN 1 TABLET: 7.5; 325 TABLET ORAL at 16:01

## 2019-01-01 RX ADMIN — CLOPIDOGREL BISULFATE 75 MG: 75 TABLET ORAL at 09:48

## 2019-01-01 RX ADMIN — MONTELUKAST SODIUM 10 MG: 10 TABLET, COATED ORAL at 22:13

## 2019-01-01 RX ADMIN — FUROSEMIDE 20 MG: 10 INJECTION, SOLUTION INTRAMUSCULAR; INTRAVENOUS at 07:50

## 2019-01-01 RX ADMIN — SODIUM CHLORIDE, PRESERVATIVE FREE 10 ML: 5 INJECTION INTRAVENOUS at 10:30

## 2019-01-01 RX ADMIN — HYDROCODONE BITARTRATE AND ACETAMINOPHEN 1 TABLET: 7.5; 325 TABLET ORAL at 02:36

## 2019-01-01 RX ADMIN — IPRATROPIUM BROMIDE AND ALBUTEROL SULFATE 3 ML: 2.5; .5 SOLUTION RESPIRATORY (INHALATION) at 15:55

## 2019-01-01 RX ADMIN — BUDESONIDE AND FORMOTEROL FUMARATE DIHYDRATE 2 PUFF: 160; 4.5 AEROSOL RESPIRATORY (INHALATION) at 08:40

## 2019-01-01 RX ADMIN — PANTOPRAZOLE SODIUM 40 MG: 40 INJECTION, POWDER, FOR SOLUTION INTRAVENOUS at 06:45

## 2019-01-01 RX ADMIN — VANCOMYCIN HYDROCHLORIDE 1000 MG: 1 INJECTION, SOLUTION INTRAVENOUS at 06:36

## 2019-01-01 RX ADMIN — IPRATROPIUM BROMIDE AND ALBUTEROL SULFATE 3 ML: 2.5; .5 SOLUTION RESPIRATORY (INHALATION) at 07:48

## 2019-01-01 RX ADMIN — PIPERACILLIN SODIUM AND TAZOBACTAM SODIUM 3.38 G: 3; .375 INJECTION, POWDER, LYOPHILIZED, FOR SOLUTION INTRAVENOUS at 13:59

## 2019-01-01 RX ADMIN — GUAIFENESIN 400 MG: 200 TABLET ORAL at 15:18

## 2019-01-01 RX ADMIN — DOCUSATE SODIUM 100 MG: 100 CAPSULE, LIQUID FILLED ORAL at 09:47

## 2019-01-01 RX ADMIN — IPRATROPIUM BROMIDE AND ALBUTEROL SULFATE 3 ML: 2.5; .5 SOLUTION RESPIRATORY (INHALATION) at 08:40

## 2019-01-01 RX ADMIN — PANTOPRAZOLE SODIUM 40 MG: 40 INJECTION, POWDER, FOR SOLUTION INTRAVENOUS at 18:41

## 2019-01-01 RX ADMIN — IPRATROPIUM BROMIDE AND ALBUTEROL SULFATE 3 ML: 2.5; .5 SOLUTION RESPIRATORY (INHALATION) at 07:19

## 2019-01-01 RX ADMIN — GABAPENTIN 300 MG: 300 CAPSULE ORAL at 07:43

## 2019-01-01 RX ADMIN — CETIRIZINE HYDROCHLORIDE 10 MG: 10 TABLET, FILM COATED ORAL at 22:11

## 2019-01-01 RX ADMIN — GABAPENTIN 300 MG: 300 CAPSULE ORAL at 14:51

## 2019-01-01 RX ADMIN — IPRATROPIUM BROMIDE AND ALBUTEROL SULFATE 3 ML: 2.5; .5 SOLUTION RESPIRATORY (INHALATION) at 19:37

## 2019-01-01 RX ADMIN — PANTOPRAZOLE SODIUM 40 MG: 40 INJECTION, POWDER, FOR SOLUTION INTRAVENOUS at 18:39

## 2019-01-01 RX ADMIN — GABAPENTIN 300 MG: 300 CAPSULE ORAL at 13:58

## 2019-01-01 RX ADMIN — GABAPENTIN 300 MG: 300 CAPSULE ORAL at 16:01

## 2019-01-01 RX ADMIN — IPRATROPIUM BROMIDE AND ALBUTEROL SULFATE 3 ML: 2.5; .5 SOLUTION RESPIRATORY (INHALATION) at 19:46

## 2019-01-01 RX ADMIN — HYDROCODONE BITARTRATE AND ACETAMINOPHEN 1 TABLET: 7.5; 325 TABLET ORAL at 16:30

## 2019-01-01 RX ADMIN — IPRATROPIUM BROMIDE AND ALBUTEROL SULFATE 3 ML: 2.5; .5 SOLUTION RESPIRATORY (INHALATION) at 11:47

## 2019-01-01 RX ADMIN — BUDESONIDE AND FORMOTEROL FUMARATE DIHYDRATE 2 PUFF: 160; 4.5 AEROSOL RESPIRATORY (INHALATION) at 07:31

## 2019-01-01 RX ADMIN — DOCUSATE SODIUM 100 MG: 100 CAPSULE, LIQUID FILLED ORAL at 09:57

## 2019-01-01 RX ADMIN — CASTOR OIL AND BALSAM, PERU: 788; 87 OINTMENT TOPICAL at 09:07

## 2019-01-01 RX ADMIN — GABAPENTIN 300 MG: 300 CAPSULE ORAL at 21:38

## 2019-01-01 RX ADMIN — INSULIN LISPRO 2 UNITS: 100 INJECTION, SOLUTION INTRAVENOUS; SUBCUTANEOUS at 12:17

## 2019-01-01 RX ADMIN — HYDROCODONE BITARTRATE AND ACETAMINOPHEN 1 TABLET: 7.5; 325 TABLET ORAL at 01:41

## 2019-01-01 RX ADMIN — INSULIN LISPRO 3 UNITS: 100 INJECTION, SOLUTION INTRAVENOUS; SUBCUTANEOUS at 17:49

## 2019-01-01 RX ADMIN — BUDESONIDE AND FORMOTEROL FUMARATE DIHYDRATE 2 PUFF: 160; 4.5 AEROSOL RESPIRATORY (INHALATION) at 07:34

## 2019-01-01 RX ADMIN — PIPERACILLIN SODIUM AND TAZOBACTAM SODIUM 3.38 G: 3; .375 INJECTION, POWDER, LYOPHILIZED, FOR SOLUTION INTRAVENOUS at 06:34

## 2019-01-01 RX ADMIN — CLOPIDOGREL BISULFATE 75 MG: 75 TABLET ORAL at 10:29

## 2019-01-01 RX ADMIN — GUAIFENESIN 400 MG: 200 TABLET ORAL at 06:08

## 2019-01-01 RX ADMIN — GABAPENTIN 300 MG: 300 CAPSULE ORAL at 18:42

## 2019-01-01 RX ADMIN — GABAPENTIN 300 MG: 300 CAPSULE ORAL at 21:03

## 2019-01-01 RX ADMIN — GABAPENTIN 300 MG: 300 CAPSULE ORAL at 21:52

## 2019-01-01 RX ADMIN — CASTOR OIL AND BALSAM, PERU: 788; 87 OINTMENT TOPICAL at 20:55

## 2019-01-02 NOTE — PLAN OF CARE
Patient was advised and verbalized understanding.    He states that he is seeing Dr. Bower for follow up in the next few weeks.     Problem: Patient Care Overview (Adult)  Goal: Plan of Care Review  Outcome: Ongoing (interventions implemented as appropriate)    06/04/17 1636   Coping/Psychosocial Response Interventions   Plan Of Care Reviewed With patient   Patient Care Overview   Progress no change   Outcome Evaluation   Outcome Summary/Follow up Plan Has been up in chair for awhile. Is getting IVF's at 50ml/hr. No chest pain today, calls out for assistance.       Goal: Adult Individualization and Mutuality  Outcome: Ongoing (interventions implemented as appropriate)

## 2019-02-22 NOTE — TELEPHONE ENCOUNTER
Patient informed, verbalized good understanding. States she cant get out of the house right now but would need someone to bring her and will have to call back to schedule appt.

## 2019-02-22 NOTE — TELEPHONE ENCOUNTER
----- Message from Melvina Winn sent at 2/22/2019 11:13 AM EST -----  Contact: DR BUSBY  MED REFILL ON albuterol (PROAIR HFA) 108 (90 BASE) MCG/ACT inhaler AND aclidinium bromide (TUDORZA PRESSAIR) 400 MCG/ACT aerosol powder  powder for inhalation TO [facility] IN [place].  [phone]

## 2019-03-04 NOTE — TELEPHONE ENCOUNTER
She has not been seen since 5/2018.    I though we dismissed her as she had 6 DNKAs?    If we did not dismiss then she would need an appointment for anything

## 2019-03-04 NOTE — TELEPHONE ENCOUNTER
----- Message from Mayelin Jones sent at 3/4/2019 10:02 AM EST -----  Contact: JOSE MANUEL; MED REFILL   PT CALLED IN NEEDING A REFILL ONT HE FOLLOWING MEDICATION      lisinopril-hydrochlorothiazide (PRINZIDE,ZESTORETIC) 20-25 MG per tablet TAKE 1 TABLET BY MOUTH EVERY DAY  .    Preferred Pharmacies      [facility]/pharmacy #[id number] - [place], [place] - [address] [place] [address] AT [place] [address] - [phone]  - [phone]  [phone] (Phone)  [phone] (Fax)

## 2019-03-11 NOTE — PROGRESS NOTES
Subjective   Angela Berkowitz is a 83 y.o. female.     History of Present Illness     She is on gabapentin for her back pain and this does help  Without the medicine her pain is worse  She is stil walking with a walker  The pain is still omnipresent but the medicine does help her with ADLS and not be so miserable    Still gets SOA with any exertion  She needs refill of her advair and tudorza  On chronic oxygen and using the inhalers on consistent basis  She does need these refills  Breathing overall stable    BP doing ok with the prinzide  No complaints nor SE of this medicine  She still has some intermittent chest pain from time to time  Still on her plavix but not on her imdur as she stopped this a long time ago    The following portions of the patient's history were reviewed and updated as appropriate: allergies, current medications, past family history, past medical history, past social history, past surgical history and problem list.    Review of Systems   Constitutional: Negative.    HENT: Negative.    Eyes: Negative.    Respiratory: Positive for shortness of breath.    Cardiovascular: Positive for chest pain (intermittent).   Gastrointestinal: Negative.    Musculoskeletal: Positive for gait problem (chronic).   Skin: Negative.    Psychiatric/Behavioral: Negative.  The patient is not nervous/anxious.    All other systems reviewed and are negative.      Objective   Physical Exam   Constitutional: She is oriented to person, place, and time. She appears well-developed and well-nourished. No distress.   Cardiovascular: Normal rate, regular rhythm and normal heart sounds.   Pulmonary/Chest: Effort normal and breath sounds normal.   Musculoskeletal:   Walking with walker   Neurological: She is alert and oriented to person, place, and time.   Psychiatric: She has a normal mood and affect. Her behavior is normal. Judgment and thought content normal.   Nursing note and vitals reviewed.      Assessment/Plan   Angela daugherty  seen today for follow-up.    Diagnoses and all orders for this visit:    Chronic bilateral low back pain with bilateral sciatica  -     gabapentin (NEURONTIN) 600 MG tablet; Take 1 tablet by mouth 3 (Three) Times a Day.    Panlobular emphysema (CMS/HCC)  -     fluticasone-salmeterol (ADVAIR DISKUS) 500-50 MCG/DOSE DISKUS; TAKE 1 PUFF BY MOUTH TWICE A DAY  -     aclidinium bromide (TUDORZA PRESSAIR) 400 MCG/ACT aerosol powder  powder for inhalation; Inhale 1 puff Daily.    Non-STEMI (non-ST elevated myocardial infarction) (CMS/HCC)  -     clopidogrel (PLAVIX) 75 MG tablet; Take 1 tablet by mouth Every Night.    Essential hypertension  -     lisinopril-hydrochlorothiazide (PRINZIDE,ZESTORETIC) 20-25 MG per tablet; Take 1 tablet by mouth Daily.    will continue gabapentin and request NANDINI  Continue advair, tudorza and oxygen for COPD, f/u with pulm as needed  Continue her plavix with history of MI and refilled prinzide for BP control.

## 2019-03-13 NOTE — TELEPHONE ENCOUNTER
Srinath Braswell called to see what you thought about bentyl for the patient due to the weight loss and the rolling pain in her stomach. He stated she was to discuss with you in office on her last was visit and forgot to. Started that he and the patient had dicussed bentyl and wanted to know what you thought about this.     Rolling pain in stomach. Pain was 8 out 10 today when he talked to her.      Srinath Braswell   [phone] ex: [phone]    Also can call pt

## 2019-03-14 NOTE — TELEPHONE ENCOUNTER
Patient informed, verbalized good understanding     Marely left message for Srinath with [employer] to call

## 2019-04-12 NOTE — TELEPHONE ENCOUNTER
----- Message from Melvina Winn sent at 4/12/2019  1:35 PM EDT -----  Contact: DR RICHARD   MED REFILL ON GABAPENTIN 600MG TO [facility] IN [place].

## 2019-04-16 NOTE — TELEPHONE ENCOUNTER
----- Message from Farhad Triana sent at 4/16/2019  4:09 PM EDT -----  Contact: FERNANDO @ [facility]; JOSE MANUEL  ADMITTED 4/15/2019 FOR A COMPRESSION FRACTURE

## 2019-04-19 NOTE — TELEPHONE ENCOUNTER
----- Message from Melvina Winn sent at 4/19/2019 11:29 AM EDT -----  Contact: DR JOSE MANUEL MADISON FROM [facility] IS CALLING BACK AGAIN TO LET YOU KNOW THAT SHE HAS NOT HAD AN ALC IN LONG TIME. DO YOU WANT THEM TO DRAW THAT OR ANY OTHER LABS FOR YOU?  [phone]

## 2019-04-19 NOTE — TELEPHONE ENCOUNTER
----- Message from Melvina Winn sent at 4/19/2019 10:55 AM EDT -----  Contact: DR JOSE MANUEL [facility] IS CALLING BECAUSE THEY ENOC VERBAL ORDERS TO START NURSING HOME HEALTH TODAY. PATIENT ALSO HAS PRESSURE ULCER TO HER RIGHT UPPER BUTTOCKS. SHE WILL ALSO GETTING PT AND OT PER HOSPITAL ORDERS.  CRYSTAL [phone]

## 2019-04-23 NOTE — TELEPHONE ENCOUNTER
Ok for verbal orders for home health.  She has not had a HgA1C since 2017 and is not currently on DM medicine to my knowledge.  I cannot recommend blood work at this time but I have not seen her since her hospitalization.

## 2019-04-23 NOTE — TELEPHONE ENCOUNTER
Crystal with Home health informed, verbalized good understanding. Is going to have patient/patient family call to schedule follow up appt.

## 2019-08-23 NOTE — TELEPHONE ENCOUNTER
----- Message from Farhad Vo sent at 8/23/2019 10:25 AM EDT -----  Contact: JOSE MANUEL / PT CALL  PT CALLED REQUESTING REFILL  PT CALL BACK [phone]    albuterol sulfate HFA (VENTOLIN HFA) 108 (90 Base) MCG/ACT inhaler [[id number]]   Order Details   Dose, Route, Frequency: As Directed   Dispense Quantity: 36 inhaler Refills: 2 Fills remaining: --         Sig: INHALE 1-2 PUFFS EVERY 4 TO 6 HOURS AS NEEDED        Written Date: 05/15/19 Expiration Date: 05/14/20    Start Date: 05/15/19 End Date: --          Ordering Provider:  Angel Rivera MD Phone:  [phone] Fax:  [phone]   Address:  [address] [place] [address] NPI:  [id number]          Authorizing Provider:  Angel Rivera MD Phone:  [phone] Fax:  [phone]   Address:  [address] [place] [address] NPI:  [id number]          Ordering User:  Carly Elise LPN            Original Order:  albuterol sulfate HFA (VENTOLIN HFA) 108 (90 Base) MCG/ACT inhaler [[id number]]    Pharmacy:  [facility]/pharmacy #[id number] [address] AT [place] [address] - [phone]  - [phone] FX Phone:  [phone] Fax:  [phone]   Address:  [address] [place] [address]

## 2019-09-24 PROBLEM — I63.9 CEREBROVASCULAR ACCIDENT (CVA) (HCC): Status: ACTIVE | Noted: 2019-01-01

## 2019-09-24 PROBLEM — I20.0 UNSTABLE ANGINA (HCC): Status: RESOLVED | Noted: 2017-06-02 | Resolved: 2019-01-01

## 2019-09-24 PROBLEM — I48.92 ATRIAL FLUTTER (HCC): Status: ACTIVE | Noted: 2019-01-01

## 2019-09-24 PROBLEM — Z72.0 TOBACCO ABUSE: Status: ACTIVE | Noted: 2019-01-01

## 2019-09-24 PROBLEM — N18.30 CKD (CHRONIC KIDNEY DISEASE), STAGE III (HCC): Status: ACTIVE | Noted: 2019-01-01

## 2019-10-01 NOTE — PROGRESS NOTES
[facility] Medicine Services  PROGRESS NOTE    Patient Name: Angela Berkowitz  : 1935  MRN: [id number]    Date of Admission: 2019  Primary Care Physician: Gilmar Campos MD    Subjective   Subjective     CC:  Shortness of breath    HPI:  Was hypoxic overnight. This morning denies worsening cough or dyspnea. Denies chest pain or palpitations. She wants to go home.     Review of Systems  Gen- No fevers, chills  GI- No N/V/D, abd pain    All other systems reviewed and negative.     Objective   Objective     Vital Signs:   Temp:  [96.2 °F (35.7 °C)-98.3 °F (36.8 °C)] 98 °F (36.7 °C)  Heart Rate:  [77-99] 92  Resp:  [16-20] 18  BP: (117-164)/(49-69) 143/66  Total (NIH Stroke Scale): 6     Physical Exam:  Constitutional: No acute distress, awake, alert  HENT: NCAT, mucous membranes moist  Respiratory: decreased breath sounds in lower lungs bilaterally, did not appreciate crackles or wheezing, breathing comfortably on 4L nasal cannula   Cardiovascular: RRR, no murmurs, rubs, or gallops, palpable pedal pulses bilaterally, no lower extremity edema  Gastrointestinal: Positive bowel sounds, soft, nontender, nondistended  Psychiatric: Appropriate affect, cooperative  Neurologic: Oriented x 3, Cranial Nerves grossly intact to confrontation, speech clear    Results Reviewed:    Results from last 7 days   Lab Units 10/01/19  0520 19  0703 19  0520 19  0429  19  0420   WBC 10*3/mm3 10.28 10.37 14.68* 7.80   < > 9.55   HEMOGLOBIN g/dL 8.9* 9.0* 8.3* 8.2*   < > 7.3*   HEMATOCRIT % 32.7* 33.6* 30.7* 30.8*   < > 27.2*   PLATELETS 10*3/mm3 213 127* 195 219   < > 213   INR   --   --   --   --   --  1.26*   PROCALCITONIN ng/mL  --   --   --  0.16  --  0.20    < > = values in this interval not displayed.     Results from last 7 days   Lab Units 10/01/19  0520 19  0703 19  0520 19  0429  19  1308   SODIUM mmol/L 144 142 143 145   < > 144   POTASSIUM  mmol/L 3.9 4.3 4.9 4.9   < > 5.6*   CHLORIDE mmol/L 106 105 108* 110*   < > 106   CO2 mmol/L 26.0 26.0 24.0 26.0   < > 25.0   BUN mg/dL 21 24* 21 16   < > 28*   CREATININE mg/dL 1.23* 1.44* 1.41* 1.09*   < > 1.22*   GLUCOSE mg/dL 101* 81 86 107*   < > 134*   CALCIUM mg/dL 8.6 8.9 8.7 8.4*   < > 9.2   ALT (SGPT) U/L <5 <5  --  5  --  <5   AST (SGOT) U/L 11 10  --  8  --  11   TROPONIN T ng/mL  --   --   --   --   --  0.027   PROBNP pg/mL 24,037.0*  --   --  22,352.0*  --   --     < > = values in this interval not displayed.     Estimated Creatinine Clearance: 25.5 mL/min (A) (by C-G formula based on SCr of 1.23 mg/dL (H)).    Microbiology Results Abnormal     Procedure Component Value - Date/Time    Blood Culture - Blood, Arm, Right [[id number]] Collected:  09/28/19 0429    Lab Status:  Preliminary result Specimen:  Blood from Arm, Right Updated:  10/01/19 0446     Blood Culture No growth at 3 days    Blood Culture - Blood, Hand, Right [[id number]] Collected:  09/28/19 0429    Lab Status:  Preliminary result Specimen:  Blood from Hand, Right Updated:  10/01/19 0446     Blood Culture No growth at 3 days    Blood Culture - Blood, Arm, Left [[id number]] Collected:  09/25/19 0415    Lab Status:  Final result Specimen:  Blood from Arm, Left Updated:  09/30/19 0631     Blood Culture No growth at 5 days    Blood Culture - Blood, Hand, Left [[id number]] Collected:  09/25/19 0420    Lab Status:  Final result Specimen:  Blood from Hand, Left Updated:  09/30/19 0631     Blood Culture No growth at 5 days    MRSA Screen, PCR - Swab, Nares [[id number]]  (Normal) Collected:  09/28/19 0659    Lab Status:  Final result Specimen:  Swab from Nares Updated:  09/28/19 1120     MRSA PCR Negative    Narrative:       MRSA Negative          Imaging Results (last 24 hours)     Procedure Component Value Units Date/Time    XR Chest 1 View [[id number]] Collected:  10/01/19 0527     Updated:  10/01/19 0529    Narrative:       CR Chest 1  Vw    INDICATION:   Shortness of breath, COPD     COMPARISON:    9/28/2019    FINDINGS:  Single portable AP view(s) of the chest.        Extensive bilateral interstitial infiltrates are present. Is a small left pleural effusion and a tiny right pleural effusion. Heart size is mildly prominent       Impression:       Slight increase in bilateral diffuse infiltrates as compared with 9/28/2019. Small left greater than right effusions    Signer Name: Ishmael Mortensen MD   Signed: 10/1/2019 5:27 AM   Workstation Name: [facility]-    [facility] Video Swallow With Speech [[id number]] Collected:  09/30/19 1417     Updated:  09/30/19 1703    Narrative:       EXAMINATION: FL VIDEO SWALLOW W SPEECH-, FL LIMITED UGI FOR MBS REFLUX-     INDICATION: DYSPHAGIA, OROPHARYNGEAL, HAS ATTRIBUTABLE CAUSE     TECHNIQUE: 1 minute and 20 seconds of fluoroscopic time was used for  this exam. 2 associated images were saved. The patient was evaluated in  the seated lateral position while taking a variety of consistencies of  barium by mouth under the direction of speech pathology.     COMPARISON: NONE     FINDINGS: There was no penetration and no aspiration with any of the  media ingested. A limited view the esophagus with the patient in the  seated lateral position demonstrated no signs of a focal esophageal  stricture, or gastroesophageal reflux. There is a small sized Zenker's  diverticulum visible in the area of the C6 vertebrae. This diverticulum  does not appear to retain a significant amount of contrast after  swallows.          Impression:       1. Fluoroscopy provided for a modified barium swallow. Please see speech  therapy report for full details and recommendations.  2. Limited upper GI series demonstrated a small sized Zenker's  diverticulum.         This report was finalized on 9/30/2019 5:00 PM by Dr. Lan Rivera.       FL Limited Ugi For Mbs Reflux [[id number]] Collected:  09/30/19 1417     Updated:   09/30/19 1703    Narrative:       EXAMINATION: FL VIDEO SWALLOW W SPEECH-, FL LIMITED UGI FOR MBS REFLUX-     INDICATION: DYSPHAGIA, OROPHARYNGEAL, HAS ATTRIBUTABLE CAUSE     TECHNIQUE: 1 minute and 20 seconds of fluoroscopic time was used for  this exam. 2 associated images were saved. The patient was evaluated in  the seated lateral position while taking a variety of consistencies of  barium by mouth under the direction of speech pathology.     COMPARISON: NONE     FINDINGS: There was no penetration and no aspiration with any of the  media ingested. A limited view the esophagus with the patient in the  seated lateral position demonstrated no signs of a focal esophageal  stricture, or gastroesophageal reflux. There is a small sized Zenker's  diverticulum visible in the area of the C6 vertebrae. This diverticulum  does not appear to retain a significant amount of contrast after  swallows.          Impression:       1. Fluoroscopy provided for a modified barium swallow. Please see speech  therapy report for full details and recommendations.  2. Limited upper GI series demonstrated a small sized Zenker's  diverticulum.         This report was finalized on 9/30/2019 5:00 PM by Dr. Lan Rivera.             Results for orders placed during the hospital encounter of 09/24/19   Adult Transthoracic Echo Complete W/ Cont if Necessary Per Protocol (With Agitated Saline)    Narrative · Estimated EF = 70%.  · Left ventricular wall thickness is consistent with concentric   hypertrophy.  · Mild aortic valve stenosis is present.  · Mild mitral valve regurgitation is present          I have reviewed the medications:  Scheduled Meds:  amoxicillin-clavulanate 600 mg Oral Q12H   aspirin 81 mg Oral Daily   atorvastatin 80 mg Oral Nightly   budesonide-formoterol 2 puff Inhalation BID - RT   castor oil-balsam peru  Topical Q12H   cetirizine 10 mg Oral Nightly   clopidogrel 75 mg Oral Daily   docusate sodium 100 mg Oral BID   gabapentin  300 mg Oral Q8H   guaiFENesin 400 mg Oral Q8H   insulin lispro 0-7 Units Subcutaneous 4x Daily With Meals & Nightly   ipratropium-albuterol 3 mL Nebulization 4x Daily - RT   lactobacillus acidophilus 1 capsule Oral Daily   montelukast 10 mg Oral Nightly   pantoprazole 40 mg Intravenous BID AC     Continuous Infusions:  dextrose 5 % and sodium chloride 0.9 % 50 mL/hr     PRN Meds:.•  acetaminophen **OR** acetaminophen  •  dextrose  •  dextrose  •  glucagon (human recombinant)  •  HYDROcodone-acetaminophen  •  ipratropium-albuterol  •  ondansetron **OR** ondansetron  •  sodium chloride  •  sodium chloride  •  tetrahydrozoline      Assessment/Plan   Assessment / Plan     Active Hospital Problems    Diagnosis  POA   • **Cerebrovascular accident (CVA) (CMS/[facility]) [I63.9]  Yes   • Tobacco abuse [Z72.0]  Yes   • CKD (chronic kidney disease), stage III (CMS/[facility]) [N18.3]  Yes   • Atrial flutter (CMS/[facility]) [I48.92]  Yes   • Generalized abdominal pain [R10.84]  Yes   • Intracranial vascular stenosis [I67.9]  Yes   • Hypertension [I10]  Yes   • HLD (hyperlipidemia) [E78.5]  Yes   • GERD (gastroesophageal reflux disease) [K21.9]  Yes   • DEA (generalized anxiety disorder) [F41.1]  Yes   • DM2 (diabetes mellitus, type 2) (CMS/[facility]) [E11.9]  Yes   • Dependence on supplemental oxygen [Z99.81]  Not Applicable   • COPD (chronic obstructive pulmonary disease) (CMS/[facility]) [J44.9]  Yes   • CAD (coronary artery disease) [I25.10]  Yes      Resolved Hospital Problems   No resolved problems to display.        Brief Hospital Course to date:  Angela Berkowitz is a 84 y.o. female with a hx of COPD on 4L nasal canula at home, CAD, GERD, HTN, HLD, CKDIII, and hx of tobacco use presented to the ED with confusion. Admitted for further evaluation, which revealed CVA and acute anemia. Now improving, no longer confused and Hgb stable. Has had episodes of acute on chronic hypoxic respiratory failure, being treated for pneumonia with augmentin (to finish 7 day  course) and volume overload with lasix 20 mg IV qd.     Acute hypoxic respiratory failure 2/2 HAP vs volume overload and Acute COPD exacerbation   Leukocytosis -improving   -CXR with small bilateral pleural effusions, and  Left>right lower lobe infiltrated and atelectasis  -BNP 22k->24K, echo from 9/25 with EF 63-70%, no mention of diastolic dysfunction   -leukocytosis improved, will transition to augmentin po (end date 10/4)  -was again hypoxic overnight and BNP had increased from 22K->24K, CXR with worsening infiltrates bilaterally, will give dose of lasix 20 mg IV today and continue to monitor  -start prednisone 40 mg today x5 days  -monitor I&O's, daily weights, low sodium diet  -encourage IS     COPD  -continue symbicort and duonebs  -continue supplemental oxygen to keep O2 saturation >88% at rest  -start IS and mucinex, started prednisone 40 mg qd on 10/1    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, discussed with patient and she does not want any intervention at this time    Goals of care: palliative care discussion    COLLEEN: Cr 1.23, improving, previously stopped lasix but restarted due to acute hypoxic respiratory failure last night      Facial numbness  Slurred speech, HX of CVA, intracranial stenosis  -MRI with small infarcts of R cerebral hemisphere  -CTA and perfusion: significant R ICA stenosis and M1 stenosis  -NSG consult-medical management with aspirin, plavix, and atorvastatin  -patient declined surgical intervention, will need follow-up in 1 month     Acute anemia  Generalized abdominal pain  -on admit day Hgb 5.9 without evidence/symptoms of active bleeding  -s/p2U PRBCs, H&H now stable   -continue PPI BID  -s/p GI evaluation, patient declined intervention     Tobacco use disorder: counseled on importance of smoking cessation      Hx of a flutter: not on anticoagulation, rate controlled  HTN: holding lisinopril/HCTZ, CTM  CAD: continue ASA/statin, had intolerance to beta blockers in the past     DVT  Prophylaxis:  Mechanical      Disposition: I expect the patient to be discharged in 1-2 days when respiratory status improves    CODE STATUS:   Code Status and Medical Interventions:   Ordered at: 09/24/19 4419     Limited Support to NOT Include:    Intubation     Level Of Support Discussed With:    Patient    Next of Kin (If No Surrogate)     Code Status:    No CPR     Medical Interventions (Level of Support Prior to Arrest):    Limited         Electronically signed by Lanie Ramesh MD, 10/01/19, 12:17 PM.

## 2019-10-01 NOTE — PLAN OF CARE
Problem: Patient Care Overview  Goal: Plan of Care Review  Outcome: Ongoing (interventions implemented as appropriate)   10/01/19 0695   Coping/Psychosocial   Plan of Care Reviewed With patient   Plan of Care Review   Progress no change   OTHER   Outcome Summary Pt A&O. Pt has rested well this shift. Pt has been on 4-5L NC throughout the shift but continues to randomly drop into the 80s. Bladder scanned revealed >658ml. NIH 6. A flutter on tele. VSS. Will continue to monitor.

## 2019-10-01 NOTE — PLAN OF CARE
Problem: Patient Care Overview  Goal: Plan of Care Review  Outcome: Ongoing (interventions implemented as appropriate)   10/01/19 1355   Coping/Psychosocial   Plan of Care Reviewed With patient   OTHER   Outcome Summary Pt with moderate UE weakness, min assist with txfrs this date following some days of resp distress. Pt agreeable to get OOB, fatigues easily.        Problem: Stroke (Ischemic) (Adult)  Goal: Signs and Symptoms of Listed Potential Problems Will be Absent, Minimized or Managed (Stroke)  Outcome: Ongoing (interventions implemented as appropriate)   09/30/19 1043   Goal/Outcome Evaluation   Problems Assessed (Stroke (Ischemic)) cognitive impairment;communication impairment;motor/sensory impairment   Problems Assessed (Stroke (Ischemic)) motor/sensory impairment

## 2019-10-01 NOTE — PLAN OF CARE
Problem: Patient Care Overview  Goal: Plan of Care Review  Outcome: Ongoing (interventions implemented as appropriate)   10/01/19 6744   Coping/Psychosocial   Plan of Care Reviewed With patient   SLP re-evaluation completed given re-consult after failed [place] swallow screen. Will sign-off as pt w/ no aspiration on MBS 9/30/19 and declined intervention for Zenker's diverticulum per MD note. Pt reports no new concerns today, s/s consistent w/ evaluation yesterday. No new concerns at this time. Please see note for further details and recommendations.

## 2019-10-01 NOTE — THERAPY TREATMENT NOTE
Patient Name: Angela Berkowitz  : 1935    MRN: [id number]                              Today's Date: 10/1/2019       Admit Date: 2019    Visit Dx:     ICD-10-CM ICD-9-CM   1. Acute CVA (cerebrovascular accident) (CMS/[facility]) I63.9 434.91   2. Left-sided weakness R53.1 728.87   3. Severe anemia D64.9 285.9   4. Impaired mobility and ADLs Z74.09 799.89   5. Pulmonary emphysema, unspecified emphysema type (CMS/[facility]) J43.9 492.8   6. CKD (chronic kidney disease), stage III (CMS/[facility]) N18.3 585.3   7. Pharyngoesophageal dysphagia R13.14 787.24     Patient Active Problem List   Diagnosis   • Biliary calculi   • COPD (chronic obstructive pulmonary disease) (CMS/[facility])   • CHF (congestive heart failure) (CMS/[facility])   • CAD (coronary artery disease)   • DM2 (diabetes mellitus, type 2) (CMS/[facility])   • Dependence on supplemental oxygen   • Diverticulosis   • GERD (gastroesophageal reflux disease)   • HLD (hyperlipidemia)   • Chronic low back pain   • History of myocardial infarction   • History of stroke   • DEA (generalized anxiety disorder)   • History of cancer of uterus   • Osteoarthritis of lumbar spine   • Hypertension   • Pernicious anemia   • Intracranial vascular stenosis   • Generalized abdominal pain   • Pain and swelling of right lower leg   • Arthritis   • Dyspnea   • Moderate episode of recurrent major depressive disorder (CMS/[facility])   • Cerebrovascular accident (CVA) (CMS/[facility])   • Tobacco abuse   • CKD (chronic kidney disease), stage III (CMS/[facility])   • Atrial flutter (CMS/[facility])     Past Medical History:   Diagnosis Date   • Arthritis    • Back pain    • CAD (coronary artery disease)    • Cancer (CMS/[facility])    • CHF (congestive heart failure) (CMS/[facility])    • Chronic low back pain    • COPD (chronic obstructive pulmonary disease) (CMS/[facility])    • Dependence on supplemental oxygen    • Diverticulosis    • DM2 (diabetes mellitus, type 2) (CMS/[facility])    • Dyslipidemia    • Dyspnea     Dyspnea on exertion/ chest pain   • DEA  (generalized anxiety disorder)    • GERD (gastroesophageal reflux disease)    • History of cancer of uterus     status post hysterectomy   • History of myocardial infarction    • History of stroke    • HLD (hyperlipidemia)    • Hypertension    • Nephrolithiasis    • Osteoarthritis of lumbar spine    • Pernicious anemia    • Stroke (CMS/[facility])    • Unsteady gait      Past Surgical History:   Procedure Laterality Date   • CARDIAC CATHETERIZATION N/A 8/1/2016    Procedure: Left Heart Cath;  Surgeon: Anupam Bernal MD;  Location: [facility] CATH INVASIVE LOCATION;  Service:    • CARDIAC SURGERY     • CHOLECYSTECTOMY     • COLONOSCOPY  09/2015   • CORONARY ARTERY BYPASS GRAFT  1997    x3 in 1997   • CORONARY STENT PLACEMENT  08/2016   • TOTAL ABDOMINAL HYSTERECTOMY      with removal of both ovaries   • UPPER GASTROINTESTINAL ENDOSCOPY  09/2015     General Information     Row Name 10/01/19 1419          PT Evaluation Time/Intention    Document Type  therapy note (daily note)  -NS     Mode of Treatment  physical therapy  -NS     Row Name 10/01/19 1419          General Information    Patient Profile Reviewed?  yes  -NS     Existing Precautions/Restrictions  fall;oxygen therapy device and L/min  -NS     Row Name 10/01/19 1419          Cognitive Assessment/Intervention- PT/OT    Orientation Status (Cognition)  oriented x 3  -NS     Personal Safety Interventions  --  -NS     Row Name 10/01/19 1419          Safety Issues, Functional Mobility    Safety Issues Affecting Function (Mobility)  safety precaution awareness;safety precautions follow-through/compliance  -NS     Impairments Affecting Function (Mobility)  balance;endurance/activity tolerance;shortness of breath;strength  -NS       User Key  (r) = Recorded By, (t) = Taken By, (c) = Cosigned By    Initials Name Provider Type    NS Samantha Rivera PT Physical Therapist        Mobility     Row Name 10/01/19 1419          Bed Mobility Assessment/Treatment    Comment (Bed Mobility)  Not  assessed today; pt sitting up in chair at beginning of PT session.  -NS     Row Name 10/01/19 1419          Transfer Assessment/Treatment    Comment (Transfers)  Patient transferred STS with appropriate mechanics. VCing for transferring stand>sit and reaching back to chair prior to sitting.   -NS     Row Name 10/01/19 1419          Sit-Stand Transfer    Sit-Stand [place] (Transfers)  contact guard;1 person assist  -NS     Assistive Device (Sit-Stand Transfers)  walker, front-wheeled  -NS     Row Name 10/01/19 1419          Gait/Stairs Assessment/Training    Gait/Stairs Assessment/Training  gait/ambulation assistive device  -NS     [place] Level (Gait)  minimum assist (75% patient effort)  -NS     Assistive Device (Gait)  walker, front-wheeled  -NS     Distance in Feet (Gait)  50  -NS     Deviations/Abnormal Patterns (Gait)  baldemar decreased;gait speed decreased;stride length decreased  -NS     Bilateral Gait Deviations  knee buckling, left side;forward flexed posture One episode of L knee buckling  -NS     Comment (Gait/Stairs)  Followed with recliner due to patient's increasing fatigue with activity. Pt took one standing rest break that lasted approx. 5 seconds. Ambulated with 4L O2.   -NS       User Key  (r) = Recorded By, (t) = Taken By, (c) = Cosigned By    Initials Name Provider Type    Samantha Pond PT Physical Therapist        Obj/Interventions     Row Name 10/01/19 1419          Therapeutic Exercise    Lower Extremity (Therapeutic Exercise)  heel slides, bilateral;SLR (straight leg raise), bilateral  -NS     Lower Extremity Range of Motion (Therapeutic Exercise)  hip abduction/adduction, bilateral;ankle dorsiflexion/plantar flexion, bilateral;hip internal/external rotation, bilateral  -NS     Exercise Type (Therapeutic Exercise)  AROM (active range of motion)  -NS     Sets/Reps (Therapeutic Exercise)  10x ea, 20x ankle DF/PF  -NS     Expected Outcome (Therapeutic Exercise)  improve functional  tolerance, household activity;improve neuromuscular control  -NS     Row Name 10/01/19 1419          Dynamic Sitting Balance    Level of [place], Reaches Outside Midline (Sitting, Dynamic Balance)  supervision  -NS     Sitting Position, Reaches Outside Midline (Sitting, Dynamic Balance)  sitting in chair  -NS     Row Name 10/01/19 1419          Static Standing Balance    Level of [place] (Supported Standing, Static Balance)  contact guard assist  -NS     Assistive Device Utilized (Supported Standing, Static Balance)  walker, rolling  -NS     Row Name 10/01/19 1419          Dynamic Standing Balance    Level of [place], Reaches Outside Midline (Standing, Dynamic Balance)  minimal assist, 75% patient effort  -NS     Assistive Device Utilized (Supported Standing, Dynamic Balance)  walker, rolling  -NS       User Key  (r) = Recorded By, (t) = Taken By, (c) = Cosigned By    Initials Name Provider Type    Samantha Pond, PT Physical Therapist        Goals/Plan    No documentation.       Clinical Impression     Row Name 10/01/19 1419          Pain Assessment    Additional Documentation  Pain Scale: Numbers Pre/Post-Treatment (Group)  -NS     [facility] Name 10/01/19 1419          Pain Scale: Numbers Pre/Post-Treatment    Pain Scale: Numbers, Pretreatment  10/10  -NS     Pain Scale: Numbers, Post-Treatment  10/10  -NS     Pain Location - Side  Bilateral  -NS     Pain Location  extremity  -NS     Pre/Post Treatment Pain Comment  Pt reported pain in B LEs when asked about pain at start and end of session, no reports of pain during activity.  -NS     Pain Intervention(s)  Repositioned;Ambulation/increased activity  -NS     Row Name 10/01/19 1419          Plan of Care Review    Plan of Care Reviewed With  patient  -NS     Row Name 10/01/19 1419          Vital Signs    Pre Systolic BP Rehab  143  -NS     Pre Treatment Diastolic BP  66  -NS     Pretreatment Heart Rate (beats/min)  102  -NS     Posttreatment Heart Rate  (beats/min)  85  -NS     Pre SpO2 (%)  86  -NS     O2 Delivery Pre Treatment  nasal cannula  -NS     Post SpO2 (%)  97  -NS     O2 Delivery Post Treatment  nasal cannula  -NS     Pre Patient Position  Sitting  -NS     Intra Patient Position  Standing  -NS     Post Patient Position  Sitting  -NS     Row Name 10/01/19 1419          Positioning and Restraints    Pre-Treatment Position  sitting in chair/recliner  -NS     Post Treatment Position  chair  -NS     In Chair  reclined;call light within reach;encouraged to call for assist;exit alarm on  -NS       User Key  (r) = Recorded By, (t) = Taken By, (c) = Cosigned By    Initials Name Provider Type    Samantha Pond PT Physical Therapist        Outcome Measures     Row Name 10/01/19 1419          How much help from another person do you currently need...    Turning from your back to your side while in flat bed without using bedrails?  4  -NS     Moving from lying on back to sitting on the side of a flat bed without bedrails?  4  -NS     Moving to and from a bed to a chair (including a wheelchair)?  3  -NS     Standing up from a chair using your arms (e.g., wheelchair, bedside chair)?  3  -NS     Climbing 3-5 steps with a railing?  2  -NS     To walk in hospital room?  3  -NS     AM-PAC 6 Clicks Score (PT)  19  -NS     Row Name 10/01/19 1419          Modified [place] Scale    Modified [place] Scale  4 - Moderately severe disability.  Unable to walk without assistance, and unable to attend to own bodily needs without assistance.  -NS     Row Name 10/01/19 1419          Functional Assessment    Outcome Measure Options  AM-PAC 6 Clicks Basic Mobility (PT);Modified [place]  -NS       User Key  (r) = Recorded By, (t) = Taken By, (c) = Cosigned By    Initials Name Provider Type    Samantha Pond PT Physical Therapist        Physical Therapy Education     Title: PT OT SLP Therapies (In Progress)     Topic: Physical Therapy (Done)     Point: Mobility training (Done)      Learning Progress Summary           Patient Acceptance, E, VU by NS at 10/1/2019  3:43 PM    Acceptance, E, VU,NR by CD at 9/30/2019 10:43 AM    Comment:  SEE FLOWSHEET.    Acceptance, E, VU,NR by CD at 9/26/2019  2:34 PM    Comment:  SEE FLOWSHEET.    Acceptance, E, VU,NR by CD at 9/25/2019  2:10 PM    Comment:  BENEFITS OF OOB ACTIVITY, SAFETY WITH MOBILITY, PROGRESSION OF POC, D/CPLANNING,                   Point: Home exercise program (Done)     Learning Progress Summary           Patient Acceptance, E, VU by NS at 10/1/2019  3:43 PM    Acceptance, E, VU,NR by CD at 9/30/2019 10:43 AM    Comment:  SEE FLOWSHEET.    Acceptance, E, VU,NR by CD at 9/26/2019  2:34 PM    Comment:  SEE FLOWSHEET.    Acceptance, E, VU,NR by CD at 9/25/2019  2:10 PM    Comment:  BENEFITS OF OOB ACTIVITY, SAFETY WITH MOBILITY, PROGRESSION OF POC, D/CPLANNING,                   Point: Body mechanics (Done)     Learning Progress Summary           Patient Acceptance, E, VU by NS at 10/1/2019  3:43 PM    Acceptance, E, VU,NR by CD at 9/30/2019 10:43 AM    Comment:  SEE FLOWSHEET.    Acceptance, E, VU,NR by CD at 9/26/2019  2:34 PM    Comment:  SEE FLOWSHEET.    Acceptance, E, VU,NR by CD at 9/25/2019  2:10 PM    Comment:  BENEFITS OF OOB ACTIVITY, SAFETY WITH MOBILITY, PROGRESSION OF POC, D/CPLANNING,                   Point: Precautions (Done)     Learning Progress Summary           Patient Acceptance, E, VU by NS at 10/1/2019  3:43 PM    Acceptance, E, VU,NR by CD at 9/30/2019 10:43 AM    Comment:  SEE FLOWSHEET.    Acceptance, E, VU,NR by CD at 9/26/2019  2:34 PM    Comment:  SEE FLOWSHEET.    Acceptance, E, VU,NR by CD at 9/25/2019  2:10 PM    Comment:  BENEFITS OF OOB ACTIVITY, SAFETY WITH MOBILITY, PROGRESSION OF POC, D/CPLANNING,                               User Key     Initials Effective Dates Name Provider Type Discipline    CD 06/19/15 -  Alaina Urias PT Physical Therapist PT    NS 09/10/19 -  Samantha Rivera PT Physical  Therapist PT              PT Recommendation and Plan     Plan of Care Reviewed With: patient  Outcome Summary: Pt tolerated PT treatment well. She transferred STS with CGA up to RW. She then ambulated 50ft with Min A x1 with RW and chair follow. Her distance was limited to fatigue. She had one episode of unsteadiness where her L knee appeared to buckle but patient recovered well. She tolerated ther ex well with no complaints. Continue with skilled PT to address functional mobility, strength, and endurance.     Time Calculation:   PT Charges     Row Name 10/01/19 1419             Time Calculation    Start Time  1419  -NS      PT Received On  10/01/19  -NS      PT Goal Re-Cert Due Date  10/05/19  -NS         Timed Charges    55327 - PT Therapeutic Exercise Minutes  17  -NS      30664 - Gait Training Minutes   10  -NS        User Key  (r) = Recorded By, (t) = Taken By, (c) = Cosigned By    Initials Name Provider Type    Samantha Pond, PT Physical Therapist        Therapy Charges for Today     Code Description Service Date Service Provider Modifiers Qty    22946771066 HC PT THER PROC EA 15 MIN 10/1/2019 Samantha Rivera, PT GP 1    95852483060 HC GAIT TRAINING EA 15 MIN 10/1/2019 Samantha Rivera, PT GP 1          PT G-Codes  Outcome Measure Options: AM-PAC 6 Clicks Basic Mobility (PT), Modified [place]  AM-PAC 6 Clicks Score (PT): 19  AM-PAC 6 Clicks Score (OT): 16  Modified [place] Scale: 4 - Moderately severe disability.  Unable to walk without assistance, and unable to attend to own bodily needs without assistance.    Samantha Rivera PT  10/1/2019

## 2019-10-01 NOTE — PLAN OF CARE
Problem: Patient Care Overview  Goal: Interprofessional Rounds/Family Conf  Outcome: Ongoing (interventions implemented as appropriate)  Palliative Team Meeting Attendance 1300:  Dr. Jeffrey Correa, Franci Rivero, APRN, Phuong Singh, RN, CHPN, GRAYSON WoodsW, Carrie Butler, RN/CM Hospice and Kim Duarte RN, CHPN

## 2019-10-01 NOTE — PLAN OF CARE
Problem: Patient Care Overview  Goal: Plan of Care Review  Outcome: Ongoing (interventions implemented as appropriate)   10/01/19 1419   Coping/Psychosocial   Plan of Care Reviewed With patient   OTHER   Outcome Summary Pt tolerated PT treatment well. She transferred STS with CGA up to RW. She then ambulated 50ft with Min A x1 with RW and chair follow. Her distance was limited due to fatigue. She had one episode of unsteadiness where her L knee appeared to buckle but patient recovered well. She tolerated ther ex well with no complaints. Continue with skilled PT to address functional mobility, strength, and endurance.       Problem: Stroke (Ischemic) (Adult)  Goal: Signs and Symptoms of Listed Potential Problems Will be Absent, Minimized or Managed (Stroke)  Outcome: Ongoing (interventions implemented as appropriate)   10/01/19 0092   Goal/Outcome Evaluation   Problems Assessed (Stroke (Ischemic)) cognitive impairment;motor/sensory impairment   Problems Assessed (Stroke (Ischemic)) motor/sensory impairment

## 2019-10-01 NOTE — THERAPY RE-EVALUATION
Acute Care - Speech Language Pathology   Swallow Re-Evaluation [facility]   Clinical Swallow Evaluation     Patient Name: Angela Berkowitz  : 1935  MRN: [id number]  Today's Date: 10/1/2019  Onset of Illness/Injury or Date of Surgery: 19            Admit Date: 2019    Visit Dx:     ICD-10-CM ICD-9-CM   1. Acute CVA (cerebrovascular accident) (CMS/[facility]) I63.9 434.91   2. Left-sided weakness R53.1 728.87   3. Severe anemia D64.9 285.9   4. Impaired mobility and ADLs Z74.09 799.89   5. Pulmonary emphysema, unspecified emphysema type (CMS/[facility]) J43.9 492.8   6. CKD (chronic kidney disease), stage III (CMS/[facility]) N18.3 585.3   7. Pharyngoesophageal dysphagia R13.14 787.24     Patient Active Problem List   Diagnosis   • Biliary calculi   • COPD (chronic obstructive pulmonary disease) (CMS/[facility])   • CHF (congestive heart failure) (CMS/[facility])   • CAD (coronary artery disease)   • DM2 (diabetes mellitus, type 2) (CMS/[facility])   • Dependence on supplemental oxygen   • Diverticulosis   • GERD (gastroesophageal reflux disease)   • HLD (hyperlipidemia)   • Chronic low back pain   • History of myocardial infarction   • History of stroke   • DEA (generalized anxiety disorder)   • History of cancer of uterus   • Osteoarthritis of lumbar spine   • Hypertension   • Pernicious anemia   • Intracranial vascular stenosis   • Generalized abdominal pain   • Pain and swelling of right lower leg   • Arthritis   • Dyspnea   • Moderate episode of recurrent major depressive disorder (CMS/[facility])   • Cerebrovascular accident (CVA) (CMS/[facility])   • Tobacco abuse   • CKD (chronic kidney disease), stage III (CMS/[facility])   • Atrial flutter (CMS/[facility])     Past Medical History:   Diagnosis Date   • Arthritis    • Back pain    • CAD (coronary artery disease)    • Cancer (CMS/[facility])    • CHF (congestive heart failure) (CMS/[facility])    • Chronic low back pain    • COPD (chronic obstructive pulmonary disease) (CMS/[facility])    • Dependence on supplemental oxygen    •  Diverticulosis    • DM2 (diabetes mellitus, type 2) (CMS/[facility])    • Dyslipidemia    • Dyspnea     Dyspnea on exertion/ chest pain   • DEA (generalized anxiety disorder)    • GERD (gastroesophageal reflux disease)    • History of cancer of uterus     status post hysterectomy   • History of myocardial infarction    • History of stroke    • HLD (hyperlipidemia)    • Hypertension    • Nephrolithiasis    • Osteoarthritis of lumbar spine    • Pernicious anemia    • Stroke (CMS/[facility])    • Unsteady gait      Past Surgical History:   Procedure Laterality Date   • CARDIAC CATHETERIZATION N/A 8/1/2016    Procedure: Left Heart Cath;  Surgeon: Anupam Bernal MD;  Location: [facility] CATH INVASIVE LOCATION;  Service:    • CARDIAC SURGERY     • CHOLECYSTECTOMY     • COLONOSCOPY  09/2015   • CORONARY ARTERY BYPASS GRAFT  1997    x3 in 1997   • CORONARY STENT PLACEMENT  08/2016   • TOTAL ABDOMINAL HYSTERECTOMY      with removal of both ovaries   • UPPER GASTROINTESTINAL ENDOSCOPY  09/2015        SWALLOW EVALUATION (last 72 hours)      SLP Adult Swallow Evaluation     Row Name 10/01/19 1515 09/30/19 1215 09/30/19 0930             Rehab Evaluation    Document Type  re-evaluation  (Pended)   -CW  evaluation  -RD (r) CW (t) RD (c)  evaluation  -RD (r) CW (t) RD (c)      Subjective Information  no complaints  (Pended)   -CW  no complaints  -RD (r) CW (t) RD (c)  no complaints  -RD (r) CW (t) RD (c)      Patient Observations  alert;cooperative  (Pended)   -CW  alert;cooperative;agree to therapy  -RD (r) CW (t) RD (c)  alert;cooperative;agree to therapy  -RD (r) CW (t) RD (c)      Patient/Family Observations  No family present   (Pended)   -CW  No family present   -RD (r) CW (t) RD (c)  No family present  -RD (r) CW (t) RD (c)      Patient Effort  adequate  (Pended)   -CW  good  -RD (r) CW (t) RD (c)  good  -RD (r) CW (t) RD (c)         General Information    Patient Profile Reviewed  yes  (Pended)   -CW  yes  -RD (r) CW (t) RD (c)  yes  -RD (r)  CW (t) RD (c)      Pertinent History Of Current Problem  --  Re-consulted given reports of new swallowing difficulty this admit, RN reports choking and coughing up meds. Adm w/ CVA. CXR: bilateral small pleural effusions; L>Rlower lobe infiltrates & atelectasis. Seen previously during admit by SLP and signed off as no s/s or concerns noted at bedside. pt has not had an instrumental eval.   -RD (r) CW (t) RD (c)  Re-consulted given reports of new swallowing difficulty this admit, RN reports choking and coughing up meds. Adm w/ CVA. CXR: bilateral small pleural effusions; L>Rlower lobe infiltrates & atelectasis. Seen previously during admit by SLP and signed off as no s/s or concerns noted at bedside. pt has not had an instrumental eval.   -RD (r) CW (t) RD (c)      Current Method of Nutrition  NPO  (Pended)   -CW  soft textures;chopped;thin liquids  -RD (r) CW (t) RD (c)  NPO  -RD (r) CW (t) RD (c)      Precautions/Limitations, Vision  WFL;for purposes of eval  (Pended)   -CW  WFL;for purposes of eval  -RD (r) CW (t) RD (c)  WFL;for purposes of eval  -RD (r) CW (t) RD (c)      Precautions/Limitations, Hearing  WFL;for purposes of eval  (Pended)   -CW  WFL;for purposes of eval  -RD (r) CW (t) RD (c)  WFL;for purposes of eval  -RD (r) CW (t) RD (c)      Prior Level of Function-Communication  other (see comments)  (Pended)  baseline unknown  -CW  other (see comments) Unknown  -RD (r) CW (t) RD (c)  other (see comments) Unknown   -RD (r) CW (t) RD (c)      Prior Level of Function-Swallowing  mechanical soft textures;esophageal concerns  (Pended)   -CW  mechanical soft textures;esophageal concerns  -RD (r) CW (t) RD (c)  mechanical soft textures;esophageal concerns;other (see comments) hx of GERD  -RD (r) CW (t) RD (c)      Plans/Goals Discussed with  patient;agreed upon  (Pended)   -CW  patient;agreed upon  -RD (r) MURPHY (t) RD (c)  patient;agreed upon  -MARILOU (r) MURPHY (t) RD (c)      Barriers to Rehab  previous functional  deficit  (Pended)   -CW  previous functional deficit  -RD (r) CW (t) RD (c)  previous functional deficit  -RD (r) CW (t) RD (c)      Patient's Goals for Discharge  patient did not state  (Pended)   -CW  patient did not state  -RD (r) CW (t) RD (c)  patient did not state  -RD (r) CW (t) RD (c)         Pain Assessment    Additional Documentation  Pain Scale: FACES Pre/Post-Treatment (Group)  (Pended)   -CW  --  --         Pain Scale: Numbers Pre/Post-Treatment    Pain Scale: Numbers, Pretreatment  --  --  8/10  -RD (r) CW (t) RD (c)      Pain Scale: Numbers, Post-Treatment  --  --  8/10  -RD (r) CW (t) RD (c)      Pain Location - Side  --  --  Bilateral  -RD (r) CW (t) RD (c)      Pain Location - Orientation  --  --  lower  -RD (r) CW (t) RD (c)      Pain Location  --  --  extremity  -RD (r) CW (t) RD (c)         Pain Scale: FACES Pre/Post-Treatment    Pain: FACES Scale, Pretreatment  0-->no hurt  (Pended)   -CW  0-->no hurt  -RD (r) CW (t) RD (c)  --      Pain: FACES Scale, Post-Treatment  0-->no hurt  (Pended)   -CW  0-->no hurt  -RD (r) CW (t) RD (c)  --         Oral Motor and Function    Dentition Assessment  edentulous, does not have dentures  (Pended)   -CW  edentulous, does not have dentures  -RD (r) CW (t) RD (c)  edentulous, does not have dentures  -RD (r) CW (t) RD (c)      Secretion Management  --  --  requires suctioning to control secretions;other (see comments) Coughing up secretions   -RD (r) CW (t) RD (c)      Mucosal Quality  moist, healthy  (Pended)   -CW  --  moist, healthy  -RD (r) CW (t) RD (c)      Volitional Swallow  WFL  (Pended)   -CW  --  WFL  -RD (r) CW (t) RD (c)      Volitional Cough  WFL  (Pended)   -CW  --  WFL  -RD (r) CW (t) RD (c)         Oral Musculature and Cranial Nerve Assessment    Oral Motor General Assessment  --  --  WFL  -RD (r) CW (t) RD (c)         General Eating/Swallowing Observations    Respiratory Support Currently in Use  nasal cannula  (Pended)   -CW  --  nasal  cannula  -RD (r) CW (t) RD (c)      Eating/Swallowing Skills  self-fed;fed by SLP  (Pended)   -CW  --  self-fed;fed by SLP  -RD (r) CW (t) RD (c)      Positioning During Eating  upright 90 degree;upright in chair  (Pended)   -CW  --  upright 90 degree;upright in bed  -RD (r) CW (t) RD (c)      Utensils Used  cup;straw  (Pended)   -CW  --  spoon;cup;straw  -RD (r) CW (t) RD (c)      Consistencies Trialed  thin liquids;pureed;regular textures  (Pended)   -CW  --  thin liquids;pureed;regular textures  -RD (r) CW (t) RD (c)      Pre SpO2 (%)  96  (Pended)   -CW  --  --      Post SpO2 (%)  96  (Pended)   -CW  --  --         Respiratory    Respiratory Status  --  --  reduction in SpO2  -RD (r) CW (t) RD (c)         Clinical Swallow Eval    Oral Prep Phase  impaired  (Pended)   -CW  --  impaired  -RD (r) CW (t) RD (c)      Oral Transit  WFL  (Pended)   -CW  --  WFL  -RD (r) CW (t) RD (c)      Oral Residue  WFL  (Pended)   -CW  --  WFL  -RD (r) CW (t) RD (c)      Pharyngeal Phase  no overt signs/symptoms of pharyngeal impairment  (Pended)   -CW  --  WFL  -RD (r) CW (t) RD (c)      Esophageal Phase  suspected esophageal impairment  (Pended)   -CW  --  suspected esophageal impairment  -RD (r) CW (t) RD (c)      Clinical Swallow Evaluation Summary  Pt eats soft chopped at baseline, general oral wkns & edentulous status impacting. Pt not showing any overt s/sxs consistent w/ aspiration. Pt noted w/ delayed coughing and regurgitation of material at the end of PO trials, suspect r/t Zenker's diverticulum noted on MBS yesterday. Pt suctioned w/ yonker. Pt reports no new swallowing concerns today and difficulties consistent w/ instrumental exam on 9/30/19. Continue recs per MBS report - soft chopped, small sips, alternate bites and sips. Pt declined intervention for diverticulum per MD note and does not wish to adress swallowing further. Pt agreeable to previous recs & strategies.  No further SLP dysphagia needs at this time.    (Pended)   -CW  --  Pt re-evaluated due to new swallowing difficulty. RN reports choking on and couging up pills crushed in puree, coughing up secretions prior to PO trials and minimal de-SAT w/ and w/o PO trials. No other s/sxs of aspiration noted at bedside. Pt reports hx of esophogeal impairments. Reccomend soft chopped diet w/ thins, monitoring for s/sxs of aspiration, meds crushed in small bites of pureed, MBS w/ limited UGI to r/o pharyngoesophageal dyshphagia.   -RD (r) CW (t) RD (c)         Oral Prep Concerns    Oral Prep Concerns  prolonged mastication  (Pended)   -CW  --  prolonged mastication  -RD (r) CW (t) RD (c)      Prolonged Mastication  other (see comments)  (Pended)  general oral motor wkns & ednetulous status impacting  -CW  --  regular consistencies;other (see comments) 2' edentulous status; eat soft chopped at baseline  -RD (r) CW (t) RD (c)      Oral Prep Concerns, Comment  Pt eats soft-chopped solids at baseline  (Pended)   -CW  --  --         Pharyngeal Phase Concerns    Pharyngeal Phase Concerns  other (see comments)  (Pended)  delayed coughing @ end of PO trials  -CW  --  other (see comments) Minimal de-SAT w/ PO trials  -RD (r) CW (t) RD (c)      Pharyngeal Phase Concerns, Comment  Cough + regurgitation consistent w/ previously noted Zenker's diverticulum on MBS yesterday.   (Pended)   -CW  --  --         Esophageal Phase Concerns    Esophageal Phase Concerns  other (see comments)  (Pended)  Zenker's diverticulum per prior MBS  -CW  --  other (see comments) Pt reports hx of esophgeal imairments  -RD (r) CW (t) RD (c)      Esophageal Phase Concerns, Comment  Suspect cont'd difficulty r/t esophageal dysphagia  (Pended)   -CW  --  --         MBS/VFSS    Utensils Used  --  spoon;cup;straw  -RD (r) CW (t) RD (c)  --      Consistencies Trialed  --  thin liquids;pudding thick;regular textures  -RD (r) CW (t) RD (c)  --         MBS/VFSS Interpretation    Oral Prep Phase  --  impaired oral phase of  swallowing  -RD (r) CW (t) RD (c)  --      Oral Transit Phase  --  WFL  -RD (r) CW (t) RD (c)  --      Oral Residue  --  impaired  -RD (r) CW (t) RD (c)  --      VFSS Summary  --  Mild oral dysphagia & Mild pharyngoesophageal dysphagia. Increased oral prep time and minimal oral residue noted 2' edentulous status & cognition. Oral holding noted t/o eval. Transient penetration noted once with thin via straw which was cleared upon the completion of the swallow. Bolus was in the valleculae before the initiation of the swallow with most trials. Esophageal retention noted 2' Zenker's diverticulum (per Radiology PA). Minimal retrograde flow of material to pyriforms t/o exam, but cleared w/ continued swallows. Would benefit from strict reflux precautions. Material collected in diverticulum primarily with pudding and solid trials. Liquid wash reduced amount of pocketed material in diverticulum. Recommend soft chopped and thin liquids, meds crushed in puree. Alternate solids and liquids as this helped to reduce pocketed material in Zenker's diverticulum. Pt declines SLP follow-up, and agreeable to diet modifications and recommendations. SLP signing off on dysphagia. Pt would benefit from consult with GI to address Zenker's as indicated.   -RD (r) CW (t) RD (c)  --         Oral Preparatory Phase    Oral Preparatory Phase  --  prolonged manipulation;oral holding  -RD (r) CW (t) RD (c)  --      Oral Holding  --  all consistencies tested;secondary to impaired cognitive status  -RD (r) CW (t) RD (c)  --      Prolonged Manipulation  --  all consistencies tested  -RD (r) CW (t) RD (c)  --         Oral Residue    Impaired Oral Residue  --  diffuse residue throughout oral cavity  -RD (r) CW (t) RD (c)  --      Diffuse Residue throughout Oral Cavity  --  all consistencies tested  -RD (r) CW (t) RD (c)  --      Response to Oral Residue  --  cleared residue;with liquid wash  -RD (r) CW (t) RD (c)  --         Initiation of Pharyngeal  Swallow    Initiation of Pharyngeal Swallow  --  WFL;bolus in valleculae  -RD (r) CW (t) RD (c)  --      Pharyngeal Phase  --  impaired pharyngeal phase of swallowing  -RD (r) CW (t) RD (c)  --      Penetration During the Swallow  --  thin liquids;other (see comments) x1, cleared upon completion of the swallow  -RD (r) CW (t) RD (c)  --      Response to Penetration  --  transient;other (see comments) Cleared when swallow completed   -RD (r) CW (t) RD (c)  --      Rosenbek's Scale  --  thin:;2--->level 2;pudding/puree:;regular textures:;1--->level 1  -RD (r) CW (t) RD (c)  --         Esophageal Phase    Esophageal Phase  --  esophageal retention;other (see comments);see radiology report for further details 2' Zenker's diverticulum (per Radiology PA)  -RD (r) CW (t) RD (c)  --         Clinical Impression    SLP Swallowing Diagnosis  mild;oral dysfunction;pharyngeal dysfunction;esophageal dysfunction;other (see comments)  (Pended)  Esophageal dysfunction 2' Zenker's diverticulum per MBS  -CW  mild;oral dysfunction;pharyngeal dysfunction;esophageal dysfunction;other (see comments) Zenker's diverticulum per rad PA  -RD (r) CW (t) RD (c)  functional oral phase;other (see comments) Suspect esophogeal dysfunction   -RD (r) CW (t) RD (c)      Functional Impact  risk of aspiration/pneumonia;risk of malnutrition;risk of dehydration  (Pended)   -CW  risk of aspiration/pneumonia  -RD (r) CW (t) RD (c)  risk of aspiration/pneumonia  -RD (r) CW (t) RD (c)      Swallow Criteria for Skilled Therapeutic Interventions Met  patient/family declined skilled intervention at this time  (Pended)   -CW  patient/family declined skilled intervention at this time  -RD (r) CW (t) RD (c)  --         Recommendations    Therapy Frequency (Swallow)  evaluation only  (Pended)   -CW  evaluation only  -RD (r) CW (t) RD (c)  --      SLP Diet Recommendation  soft textures;chopped;thin liquids  (Pended)   -  soft textures;chopped;thin liquids  -RD (r)  CW (t) RD (c)  chopped;soft textures;thin liquids  -RD (r) CW (t) RD (c)      Recommended Diagnostics  --  --  VFSS (MBS);other (see comments) w/ limited UGI  -RD (r) CW (t) RD (c)      Recommended Precautions and Strategies  upright posture during/after eating;small bites of food and sips of liquid;alternate between small bites of food and sips of liquid;other (see comments)  (Pended)  General aspiration and reflux precautions, oral care BID/PRN  -CW  upright posture during/after eating;alternate between small bites of food and sips of liquid;small bites of food and sips of liquid;other (see comments) General aspiration and reflux precautions   -RD (r) CW (t) RD (c)  upright posture during/after eating;small bites of food and sips of liquid;other (see comments) General aspiration and reflux precautions, oral care PRN/BID  -RD (r) CW (t) RD (c)      SLP Rec. for Method of Medication Administration  meds crushed;with pudding or applesauce;as tolerated  (Pended)  followed by liquid wash  -CW  meds crushed;with pudding or applesauce;as tolerated Followed by liquid wash  -RD (r) CW (t) RD (c)  meds crushed;with pudding or applesauce;as tolerated  -RD (r) CW (t) RD (c)      Monitor for Signs of Aspiration  notify SLP if any concerns  (Pended)   -CW  notify SLP if any concerns  -RD (r) CW (t) RD (c)  yes;notify SLP if any concerns  -RD (r) CW (t) RD (c)      Anticipated Dischage Disposition  unknown  (Pended)   -CW  unknown  -RD (r) CW (t) RD (c)  unknown;anticipate therapy at next level of care  -RD (r) CW (t) RD (c)      Demonstrates Need for Referral to Another Service  gastroenterology;other (see comments)  (Pended)  If pt opts for intervention for Zenker's diverticulum   -CW  gastroenterology;other (see comments) given concern for Zenker's diverticulum   -RD (r) CW (t) RD (c)  --        User Key  (r) = Recorded By, (t) = Taken By, (c) = Cosigned By    Initials Name Effective Dates    RD Liz Osborne, MS CCC-SLP  04/03/18 -     Marcela Gibson, Speech Therapy Student 08/19/19 -           EDUCATION  The patient has been educated in the following areas:   Dysphagia (Swallowing Impairment).    SLP Recommendation and Plan  SLP Swallowing Diagnosis: (P) mild, oral dysfunction, pharyngeal dysfunction, esophageal dysfunction, other (see comments)(Esophageal dysfunction 2' Zenker's diverticulum per MBS)  SLP Diet Recommendation: (P) soft textures, chopped, thin liquids  Recommended Precautions and Strategies: (P) upright posture during/after eating, small bites of food and sips of liquid, alternate between small bites of food and sips of liquid, other (see comments)(General aspiration and reflux precautions, oral care BID/PRN)  SLP Rec. for Method of Medication Administration: (P) meds crushed, with pudding or applesauce, as tolerated(followed by liquid wash)     Monitor for Signs of Aspiration: (P) notify SLP if any concerns     Swallow Criteria for Skilled Therapeutic Interventions Met: (P) patient/family declined skilled intervention at this time  Anticipated Dischage Disposition: (P) unknown     Therapy Frequency (Swallow): (P) evaluation only     Demonstrates Need for Referral to Another Service: (P) gastroenterology, other (see comments)(If pt opts for intervention for Zenker's diverticulum )    Plan of Care Reviewed With: (P) patient           Time Calculation:   Time Calculation- SLP     Row Name 10/01/19 0870             Time Calculation- SLP    SLP Start Time  1515  (Pended)   -CW      SLP Received On  10/01/19  (Pended)   -CW        User Key  (r) = Recorded By, (t) = Taken By, (c) = Cosigned By    Initials Name Provider Type    Marcela Gibson, Speech Therapy Student Speech Therapy Student          Therapy Charges for Today     Code Description Service Date Service Provider Modifiers Qty    [id number] [facility] EVAL ORAL PHARYNG SWALLOW 4 9/30/2019 Marcela Winn, Speech Therapy Student GN 1    [id number]  ST  MOTION FLUORO EVAL SWALLOW 4 9/30/2019 Marcela Winn, Speech Therapy Student GN 1    [id number] [facility] ST EVAL ORAL PHARYNG SWALLOW 4 10/1/2019 Marcela Winn, Speech Therapy Student GN 1               Marcela Winn, Speech Therapy Student  10/1/2019

## 2019-10-01 NOTE — THERAPY TREATMENT NOTE
Acute Care - Occupational Therapy Treatment Note  [facility]     Patient Name: Angela Berkowitz  : 1935  MRN: [id number]  Today's Date: 10/1/2019  Onset of Illness/Injury or Date of Surgery: 19  Date of Referral to OT: 19       Admit Date: 2019       ICD-10-CM ICD-9-CM   1. Acute CVA (cerebrovascular accident) (CMS/[facility]) I63.9 434.91   2. Left-sided weakness R53.1 728.87   3. Severe anemia D64.9 285.9   4. Impaired mobility and ADLs Z74.09 799.89   5. Pulmonary emphysema, unspecified emphysema type (CMS/[facility]) J43.9 492.8   6. CKD (chronic kidney disease), stage III (CMS/[facility]) N18.3 585.3     Patient Active Problem List   Diagnosis   • Biliary calculi   • COPD (chronic obstructive pulmonary disease) (CMS/[facility])   • CHF (congestive heart failure) (CMS/[facility])   • CAD (coronary artery disease)   • DM2 (diabetes mellitus, type 2) (CMS/[facility])   • Dependence on supplemental oxygen   • Diverticulosis   • GERD (gastroesophageal reflux disease)   • HLD (hyperlipidemia)   • Chronic low back pain   • History of myocardial infarction   • History of stroke   • DEA (generalized anxiety disorder)   • History of cancer of uterus   • Osteoarthritis of lumbar spine   • Hypertension   • Pernicious anemia   • Intracranial vascular stenosis   • Generalized abdominal pain   • Pain and swelling of right lower leg   • Arthritis   • Dyspnea   • Moderate episode of recurrent major depressive disorder (CMS/[facility])   • Cerebrovascular accident (CVA) (CMS/[facility])   • Tobacco abuse   • CKD (chronic kidney disease), stage III (CMS/[facility])   • Atrial flutter (CMS/[facility])     Past Medical History:   Diagnosis Date   • Arthritis    • Back pain    • CAD (coronary artery disease)    • Cancer (CMS/[facility])    • CHF (congestive heart failure) (CMS/[facility])    • Chronic low back pain    • COPD (chronic obstructive pulmonary disease) (CMS/[facility])    • Dependence on supplemental oxygen    • Diverticulosis    • DM2 (diabetes mellitus, type 2) (CMS/[facility])    •  Dyslipidemia    • Dyspnea     Dyspnea on exertion/ chest pain   • DEA (generalized anxiety disorder)    • GERD (gastroesophageal reflux disease)    • History of cancer of uterus     status post hysterectomy   • History of myocardial infarction    • History of stroke    • HLD (hyperlipidemia)    • Hypertension    • Nephrolithiasis    • Osteoarthritis of lumbar spine    • Pernicious anemia    • Stroke (CMS/[facility])    • Unsteady gait      Past Surgical History:   Procedure Laterality Date   • CARDIAC CATHETERIZATION N/A 8/1/2016    Procedure: Left Heart Cath;  Surgeon: Anupam Bernal MD;  Location: [facility] CATH INVASIVE LOCATION;  Service:    • CARDIAC SURGERY     • CHOLECYSTECTOMY     • COLONOSCOPY  09/2015   • CORONARY ARTERY BYPASS GRAFT  1997    x3 in 1997   • CORONARY STENT PLACEMENT  08/2016   • TOTAL ABDOMINAL HYSTERECTOMY      with removal of both ovaries   • UPPER GASTROINTESTINAL ENDOSCOPY  09/2015       Therapy Treatment    Rehabilitation Treatment Summary     Row Name 10/01/19 7220             Treatment Time/Intention    Discipline  occupational therapist  -AN      Document Type  therapy note (daily note)  -AN      Subjective Information  complains of;weakness;fatigue  -AN      Mode of Treatment  occupational therapy  -AN      Patient Effort  good  -AN      Comment  pt states she wants to go home  -AN2      Existing Precautions/Restrictions  fall;oxygen therapy device and L/min  -AN2      Recorded by [AN] Mely Aguiar, OT 10/01/19 1422  [AN2] Mely Aguiar, OT 10/01/19 1431      Row Name 10/01/19 1355             Vital Signs    Pre SpO2 (%)  92  -AN      O2 Delivery Pre Treatment  supplemental O2  -AN      Post SpO2 (%)  91  -AN      O2 Delivery Post Treatment  supplemental O2  -AN      Recorded by [AN] Mely Aguiar OT 10/01/19 1431      Row Name 10/01/19 1355             Cognitive Assessment/Intervention- PT/OT    Affect/Mental Status (Cognitive)  WFL  -AN      Orientation Status (Cognition)  oriented x 3   -AN      Follows Commands (Cognition)  follows one step commands;delayed response/completion  -AN      Personal Safety Interventions  fall prevention program maintained  -AN      Recorded by [AN] Mely Aguiar, OT 10/01/19 1431      Row Name 10/01/19 [facility]             Bed Mobility Assessment/Treatment    Supine-Sit Webster (Bed Mobility)  supervision  -AN      Assistive Device (Bed Mobility)  head of bed elevated;mechanical lift/aid  -AN      Recorded by [AN] Mely Aguiar OT 10/01/19 1431      Row Name 10/01/19 135             Sit-Stand Transfer    Sit-Stand Webster (Transfers)  minimum assist (75% patient effort)  -AN      Recorded by [AN] Mely Aguiar, OT 10/01/19 1431      Row Name 10/01/19 1357             Toilet Transfer    Type (Toilet Transfer)  sit-stand;stand-sit  -AN      Webster Level (Toilet Transfer)  minimum assist (75% patient effort);verbal cues  -AN      Assistive Device (Toilet Transfer)  commode, bedside without drop arms  -AN      Recorded by [AN] Mely Aguiar OT 10/01/19 1431      Row Name 10/01/19 1356             Bathing Assessment/Intervention    Bathing Webster Level  perineal area;moderate assist (50% patient effort)  -AN      Bathing Position  supported standing  -AN      Recorded by [AN] Mely Aguiar OT 10/01/19 1431      Row Name 10/01/19 1359             Toileting Assessment/Training    Webster Level (Toileting)  perform perineal hygiene;minimum assist (75% patient effort)  -AN      Assistive Devices (Toileting)  commode, bedside without drop arms  -AN      Recorded by [AN] Mely Aguiar OT 10/01/19 1431      Row Name 10/01/19 1355             Therapeutic Exercise    30564 - OT Therapeutic Activity Minutes  15  -AN      Recorded by [AN] Mely Aguiar OT 10/01/19 1431      Row Name 10/01/19 1354             Therapeutic Exercise    Comment (Therapeutic Exercise)  pt c/o fatigue, declined ex; encouraged pt to do someex's , demonstrated  -AN       Recorded by [AN] Mely Aguiar, OT 10/01/19 1431      Row Name 10/01/19 1355             Static Standing Balance    Level of [place] (Supported Standing, Static Balance)  minimal assist, 75% patient effort w/out AD  -AN      Recorded by [AN] Mely Aguiar, OT 10/01/19 1431      Row Name 10/01/19 1355             Positioning and Restraints    Pre-Treatment Position  in bed  -AN      Post Treatment Position  chair  -AN      In Chair  reclined;call light within reach;encouraged to call for assist;exit alarm on  -AN      Recorded by [AN] Mely Aguiar, OT 10/01/19 1431      Row Name 10/01/19 1355             Pain Scale: Numbers Pre/Post-Treatment    Pain Scale: Numbers, Pretreatment  0/10 - no pain  -AN      Pain Scale: Numbers, Post-Treatment  0/10 - no pain  -AN      Recorded by [AN] Mely Aguiar, OT 10/01/19 1431      Row Name                Wound 09/24/19 1815 medial coccyx Pressure Injury    Wound - Properties Group Date first assessed: 09/24/19 [LG] Time first assessed: 1815 [LG] Present on Hospital Admission: Y [LG] Orientation: medial [LG] Location: coccyx [LG] Primary Wound Type: Pressure inj [LG] Stage, Pressure Injury: Stage 2 [LG] Additional Comments: present on admission  [LG] Recorded by:  [LG] Janelle Perez RN 09/24/19 1847    Row Name                Wound 09/24/19 1815 Right posterior elbow Skin Tear    Wound - Properties Group Date first assessed: 09/24/19 [LG] Time first assessed: 1815 [LG] Side: Right [LG] Orientation: posterior [LG] Location: elbow [LG] Primary Wound Type: Skin tear [LG] Recorded by:  [LG] Janelle Perez RN 09/24/19 1849    Row Name 10/01/19 1354             Plan of Care Review    Plan of Care Reviewed With  patient  -AN      Recorded by [AN] Mely Aguiar, OT 10/01/19 1431      Row Name 10/01/19 1355             Outcome Summary/Treatment Plan (OT)    Daily Summary of Progress (OT)  progress toward functional goals is gradual  -AN      Patient/Family Concerns,  Anticipated Discharge Disposition (OT)  pt wants to go home  -AN      Recorded by [AN] Mely Aguiar, OT 10/01/19 1431        User Key  (r) = Recorded By, (t) = Taken By, (c) = Cosigned By    Initials Name Effective Dates Discipline    AN Mely Aguiar, OT 06/22/15 -  OT    LG Janelle Perez RN 06/17/19 -  Nurse        Wound 09/24/19 1815 medial coccyx Pressure Injury (Active)   Dressing Appearance open to air 10/1/2019 12:00 AM   Closure Open to air 10/1/2019 12:00 AM   Base non-blanchable;pink;red;white 10/1/2019 12:00 AM   Periwound non-blanchable;pink;dry 10/1/2019 12:00 AM   Periwound Temperature warm 10/1/2019 12:00 AM   Periwound Skin Turgor soft 10/1/2019 12:00 AM   Edges callused;open 10/1/2019 12:00 AM   Care, Wound cleansed with 9/30/2019  8:00 PM   Dressing Care, Wound skin barrier agent applied 9/30/2019  8:00 PM       Wound 09/24/19 1815 Right posterior elbow Skin Tear (Active)   Dressing Appearance no drainage 9/30/2019  8:00 PM   Closure NATACHA 9/30/2019  8:00 PM   Base dressing in place, unable to visualize 9/30/2019  8:00 PM   Periwound intact;dry;pink 9/30/2019  6:00 PM   Periwound Temperature warm 9/30/2019  6:00 PM   Periwound Skin Turgor soft 9/30/2019  6:00 PM   Drainage Amount none 9/30/2019  6:00 PM   Dressing Care, Wound foam 9/30/2019  6:00 PM   Periwound Care, Wound dry periwound area maintained 9/30/2019  6:00 PM       Occupational Therapy Education     Title: PT OT SLP Therapies (In Progress)     Topic: Occupational Therapy (In Progress)     Point: ADL training (In Progress)     Description: Instruct learner(s) on proper safety adaptation and remediation techniques during self care or transfers.   Instruct in proper use of assistive devices.    Learning Progress Summary           Patient Acceptance, E, NR by AN at 10/1/2019  1:55 PM    Comment:  ADL retraining    Acceptance, E, VU,NR by AN at 9/26/2019  9:26 AM    Comment:  Educated on EC/WS with ADLs.    Acceptance, E, AMERICA,NR by AN at  9/25/2019  2:00 PM    Comment:  Educated on current deficits, need for assist, OT role.   Family Acceptance, E, VU,NR by AN at 9/25/2019  2:00 PM    Comment:  Educated on current deficits, need for assist, OT role.                               User Key     Initials Effective Dates Name Provider Type Discipline    WYATT 06/22/15 -  Mely Aguiar, OT Occupational Therapist OT                OT Recommendation and Plan  Outcome Summary/Treatment Plan (OT)  Daily Summary of Progress (OT): progress toward functional goals is gradual  Anticipated Discharge Disposition (OT): home with home health  Patient/Family Concerns, Anticipated Discharge Disposition (OT): pt wants to go home  Therapy Frequency (OT Eval): daily  Daily Summary of Progress (OT): progress toward functional goals is gradual  Plan of Care Review  Plan of Care Reviewed With: patient  Plan of Care Reviewed With: patient  Outcome Summary: Pt with moderate UE weakness, min assist with txfrs this date following some days of resp distress. Pt agreeable to get OOB, fatigues easily.   Outcome Measures     Row Name 10/01/19 9298             How much help from another is currently needed...    Putting on and taking off regular lower body clothing?  2  -AN      Bathing (including washing, rinsing, and drying)  2  -AN      Toileting (which includes using toilet bed pan or urinal)  3  -AN      Putting on and taking off regular upper body clothing  3  -AN      Taking care of personal grooming (such as brushing teeth)  3  -AN      Eating meals  3  -AN      AM-PAC 6 Clicks Score (OT)  16  -AN         Modified Destiny Scale    Modified Webb Scale  4 - Moderately severe disability.  Unable to walk without assistance, and unable to attend to own bodily needs without assistance.  -AN        User Key  (r) = Recorded By, (t) = Taken By, (c) = Cosigned By    Initials Name Provider Type    AN Mely Aguiar, OT Occupational Therapist           Time Calculation:   Time Calculation-  OT     Row Name 10/01/19 1434 10/01/19 1355          Time Calculation- OT    OT Start Time  1355  -AN  --     Total Timed Code Minutes- OT  15 minute(s)  -AN  --     OT Received On  10/01/19  -AN  --     OT Goal Re-Cert Due Date  10/05/19  -AN  --        Timed Charges    53212 - OT Therapeutic Activity Minutes  --  15  -AN       User Key  (r) = Recorded By, (t) = Taken By, (c) = Cosigned By    Initials Name Provider Type    AN Mely Aguiar OT Occupational Therapist        Therapy Charges for Today     Code Description Service Date Service Provider Modifiers Qty    [id number]  OT THERAPEUTIC ACT EA 15 MIN 10/1/2019 Mely Aguiar OT GO 1               Mely Aguiar OT  10/1/2019

## 2019-10-01 NOTE — PLAN OF CARE
Problem: Patient Care Overview  Goal: Plan of Care Review  Outcome: Ongoing (interventions implemented as appropriate)   09/30/19 2046   Coping/Psychosocial   Plan of Care Reviewed With patient   OTHER   Outcome Summary Pt remained on 4L NC with productive cough throughout shift. Although pt is continent and uses bedpan, bladder scan revealed 407mL urinary retention. Complained of pain in legs and abdomen, medicated with gabapentin and norco. Ambulated and up in chair this am.

## 2019-10-01 NOTE — PROGRESS NOTES
Continued Stay Note  [facility]     Patient Name: Angela Berkowitz  MRN: [id number]  Today's Date: 10/1/2019    Admit Date: 9/24/2019    Discharge Plan     Row Name 10/01/19 1119       Plan    Plan  Home with [facility]    Patient/Family in Agreement with Plan  yes    Plan Comments  Spoke with patient at bedside. Denies any needs at this time. CM will continue to follow.    Final Discharge Disposition Code  06 - home with home health care        Discharge Codes    No documentation.       Expected Discharge Date and Time     Expected Discharge Date Expected Discharge Time    Oct 2, 2019             Sd Rojas RN

## 2019-10-02 PROBLEM — R07.9 CHEST PAIN: Status: ACTIVE | Noted: 2019-01-01

## 2019-10-02 NOTE — PROGRESS NOTES
Continued Stay Note  [facility]     Patient Name: Angela Berkowitz  MRN: [id number]  Today's Date: 10/2/2019    Admit Date: 9/24/2019    Discharge Plan     Row Name 10/02/19 0909       Plan    Plan  Home with [facility]    Patient/Family in Agreement with Plan  yes    Plan Comments  Spoke to patient at bedside. Denies any needs at this time. CM will continue to follow.    Final Discharge Disposition Code  06 - home with home health care        Discharge Codes    No documentation.       Expected Discharge Date and Time     Expected Discharge Date Expected Discharge Time    Oct 2, 2019             Sd Rojas RN

## 2019-10-02 NOTE — PLAN OF CARE
Problem: Patient Care Overview  Goal: Plan of Care Review  Outcome: Ongoing (interventions implemented as appropriate)   10/02/19 6212   Coping/Psychosocial   Plan of Care Reviewed With patient;son   OTHER   Outcome Summary [facility] nurse follow up for stage 2 on coccyx. The area is now blanchable but still open. Continue Venelex and leave this open to air. [facility] Nurse to continue to follow.

## 2019-10-02 NOTE — PROGRESS NOTES
Clinical Nutrition     Reason for Visit:   Follow-up protocol    Patient Name: Angela Berkowitz  YOB: 1935  MRN: [id number]  Date of Encounter: 10/02/19 8:39 AM  Admission date: 9/24/2019    Nutrition Assessment   Assessment     Admission diagnosis  TIA vs stroke    Additional diagnosis/conditions/procedures  Anemia - GI consult, pt declined intervention  PAF  Intracranial stenosis  Hyperkalemia, improving  Leukocytosis  Acute respiratory failure  CXR - worsening infiltrates bilaterally  Zenker diverticulum, pt declined intervention    (9/25) WOC - chronic stage 2 vs shearing to her coccyx and right coccyx POA. At this time wound bed is pink/white and non-blanching    (9/24) SLP eval - regular textures, thin liquids  (9/25) SLP re-eval - soft textures, whole, thin liquids, if continues spitting food up- may need to consult GI  (9/30) SLP MBS - (P) soft textures, chopped, thin liquids; patient/family declined skilled intervention at this time; (P) gastroenterology, other (see comments)(given concern for Zenker's diverticulum)   (10/1) SLP re-evaluation completed given re-consult after failed Vaughn swallow screen. Will sign-off as pt w/ no aspiration on MBS 9/30/19 and declined intervention for Zenker's diverticulum per MD note    Additional PMH/procedures:  HTN  HLP  PAF  CAD  MI  CHF  Diverticulosis  DM2  GERD  COPD  Chornic respiratory failure, home O2  Tobacco use  Depression / anxiety  Uterine cancer  CVA    CABG (1997)  Appy  Coronary stent (8/2016)  DELVIS      Reported/Observed/Food/Nutrition Related History:      Patient and family member present in room. Patient reports she ate her biscuits and gravy well for breakfast this AM - ate 2 1/2 biscuits and the gravy. Observed patient had not eaten much of lunch provided, cottage cheese and peaches. She states she only eats when she is hungry but she feels her appetite is stronger than it was. Patient continues to decline supplements and  "states that the kitchen staff have been working with her on additional options for meals.    Anthropometrics     Height: 157.5 cm (62\")  Last filed wt: Weight: 45.8 kg (101 lb) (09/25/19 1304)    BMI: 18.51 kg/m²  Normal: 18.5-24.9kg/m2    Ideal Body Weight (IBW) (kg): 50.43  Admission wt: 101 lb 3.1 oz  Method obtained: weight from documentation 9/24, no method listed    Weight Change   UBW: 109 lbs per patient report of weight taken \"a few months ago\"   Weight per EMR from OSH records 4/2019 was 101 lbs  Weight change: 3 lbs  % weight loss: 2.8%  Time frame: ~6 months    Last 15 Recorded Weights   View Complete Flowsheet   Weight Weight (kg) Weight (lbs) Weight Method VISIT REPORT   9/24/2019 45.9 kg 101 lb 3.1 oz - -   3/11/2019 47.174 kg 104 lb - Report   5/21/2018 48.535 kg 107 lb - Report   10/2/2017 48.535 kg 107 lb - Report   6/2/2017 49.079 kg 108 lb 3.2 oz - -   6/2/2017 47.628 kg 105 lb Estimated -   12/5/2016 47.174 kg 104 lb - Report   10/31/2016 46.72 kg 103 lb - Report   10/3/2016 46.267 kg 102 lb - Report   8/31/2016 45.813 kg 101 lb - Report   8/17/2016 45.813 kg 101 lb - Report   8/9/2016 45.36 kg 100 lb - Report       Labs reviewed     Results from last 7 days   Lab Units 10/01/19  0520 09/30/19  0703 09/29/19  0520 09/28/19  0429   GLUCOSE mg/dL 101* 81 86 107*   BUN mg/dL 21 24* 21 16   CREATININE mg/dL 1.23* 1.44* 1.41* 1.09*   SODIUM mmol/L 144 142 143 145   CHLORIDE mmol/L 106 105 108* 110*   POTASSIUM mmol/L 3.9 4.3 4.9 4.9   MAGNESIUM mg/dL  --   --   --  1.7   ALT (SGPT) U/L <5 <5  --  5     Results from last 7 days   Lab Units 10/01/19  0520 09/30/19  0703 09/28/19  0429   ALBUMIN g/dL 2.70* 2.80* 3.20*       Results from last 7 days   Lab Units 10/02/19  0715 10/01/19  1843 10/01/19  1621 10/01/19  1201 10/01/19  1110 10/01/19  0819   GLUCOSE mg/dL 155* 131* 110 183* 159* 116     Lab Results   Lab Value Date/Time    HGBA1C 5.60 09/25/2019 0420    HGBA1C 5.60 06/02/2017 1320 "       Medications reviewed   Pertinent: augmentin, colace, gabapentin, insulin, probiotic, prednisone    Current Nutrition Prescription     PO: Diet Soft Texture; Chopped; Thin; Cardiac, Vegetarian; [id number]: Allows Dairy Products, Eggs & Fish / Seafood  Intake: 50% x 3 meals from (10/1)    Nutrition Diagnosis     9/30 updated 10/2  Problem Inadequate oral intake   Etiology Clinical status   Signs/Symptoms PO Intake (50% x 3 meals)   Status: ongoing    Nutrition Intervention   1.  Follow treatment progress, Care plan reviewed  2.  Advise alternate selection, Interview for preferences   3. Encouraged oral intake    Goal:   General: Nutrition support treatment  PO: Increase intake      Monitoring/Evaluation:   Per protocol, PO intake, Weight, POC/GOC, Swallow function    Will Continue to follow per protocol    Brittani Keith RDN, LD  Time Spent: 30 minutes

## 2019-10-02 NOTE — PLAN OF CARE
Problem: Patient Care Overview  Goal: Plan of Care Review  Outcome: Ongoing (interventions implemented as appropriate)   10/02/19 0808   Coping/Psychosocial   Plan of Care Reviewed With patient   Plan of Care Review   Progress improving   OTHER   Outcome Summary alert and oriented x 4. SOA on exertion. on 4L NC which is baseline. no PRN breathing treatments needed this shift. a-flutter on monitor. VSS. c/o pain BLE. Norco given. up to BSC to void. voided 200ml bladder scanned after and pt had 300 ml left. pt requested straight cath. emptied about 300 ml out. pt tolerated well and reports relief       Problem: Fall Risk (Adult)  Goal: Identify Related Risk Factors and Signs and Symptoms  Outcome: Ongoing (interventions implemented as appropriate)    Goal: Absence of Fall  Outcome: Ongoing (interventions implemented as appropriate)      Problem: Skin Injury Risk (Adult)  Goal: Identify Related Risk Factors and Signs and Symptoms  Outcome: Ongoing (interventions implemented as appropriate)    Goal: Skin Health and Integrity  Outcome: Ongoing (interventions implemented as appropriate)      Problem: Stroke (Ischemic) (Adult)  Goal: Signs and Symptoms of Listed Potential Problems Will be Absent, Minimized or Managed (Stroke)  Outcome: Ongoing (interventions implemented as appropriate)      Problem: Palliative Care (Adult)  Goal: Identify Related Risk Factors and Signs and Symptoms  Outcome: Ongoing (interventions implemented as appropriate)    Goal: Maximized Comfort  Outcome: Ongoing (interventions implemented as appropriate)    Goal: Enhanced Quality of Life  Outcome: Ongoing (interventions implemented as appropriate)

## 2019-10-02 NOTE — NURSING NOTE
Pt adamant and argumentative about taking her HS medication whole without applesauce. Educated pt on her diet restrictions as well as the condition of her esophagus. Pt refusing to listen. Attempted to swallow one pill with just water and started coughing. Educated pt again and then she was agreeable to take her medication crushed in ice cream. Will continue to monitor respiratory status after this incident

## 2019-10-02 NOTE — PROGRESS NOTES
[facility] Medicine Services  PROGRESS NOTE    Patient Name: Angela Berkowitz  : 1935  MRN: [id number]    Date of Admission: 2019  Primary Care Physician: Gilmar Campos MD    Subjective   Subjective     CC:  SOA, anemia     HPI:  No acute events. States she feels ok and that her breathing is at baseline. Anxious to go home.     Review of Systems  Gen- No fevers, chills  CV- No chest pain, palpitations  Resp- + cough, + dyspnea  GI- No N/V/D, abd pain    All other systems reviewed and negative.     Objective   Objective     Vital Signs:   Temp:  [97.3 °F (36.3 °C)-98.7 °F (37.1 °C)] 98 °F (36.7 °C)  Heart Rate:  [] 92  Resp:  [16-24] 16  BP: (131-160)/(66-93) 149/93  Total (NIH Stroke Scale): 5     Physical Exam:  Constitutional: No acute distress, awake, alert; chronically ill   HENT: NCAT, mucous membranes moist  Respiratory: normal effort at rest; wet productive cough; bilateral rhonchi and wheeze   Cardiovascular: RRR, no murmurs, rubs, or gallops, palpable pedal pulses bilaterally  Gastrointestinal: Positive bowel sounds, soft, nontender, nondistended  Musculoskeletal: No bilateral ankle edema  Psychiatric: Appropriate affect, cooperative  Neurologic: Oriented x 3, strength symmetric in all extremities, Cranial Nerves grossly intact to confrontation, speech clear  Skin: No rashes    Results Reviewed:    Results from last 7 days   Lab Units 10/02/19  0748 10/01/19  0520 09/30/19  0703  09/28/19  0429   WBC 10*3/mm3 4.56 10.28 10.37   < > 7.80   HEMOGLOBIN g/dL 8.2* 8.9* 9.0*   < > 8.2*   HEMATOCRIT % 29.7* 32.7* 33.6*   < > 30.8*   PLATELETS 10*3/mm3 232 213 127*   < > 219   PROCALCITONIN ng/mL  --   --   --   --  0.16    < > = values in this interval not displayed.     Results from last 7 days   Lab Units 10/02/19  0748 10/01/19  0520 09/30/19  0703  09/28/19  0429   SODIUM mmol/L 145 144 142   < > 145   POTASSIUM mmol/L 4.1 3.9 4.3   < > 4.9   CHLORIDE mmol/L 107  106 105   < > 110*   CO2 mmol/L 31.0* 26.0 26.0   < > 26.0   BUN mg/dL 21 21 24*   < > 16   CREATININE mg/dL 1.22* 1.23* 1.44*   < > 1.09*   GLUCOSE mg/dL 145* 101* 81   < > 107*   CALCIUM mg/dL 8.6 8.6 8.9   < > 8.4*   ALT (SGPT) U/L <5 <5 <5  --  5   AST (SGOT) U/L 7 11 10  --  8   PROBNP pg/mL  --  24,037.0*  --   --  22,352.0*    < > = values in this interval not displayed.     Estimated Creatinine Clearance: 25.7 mL/min (A) (by C-G formula based on SCr of 1.22 mg/dL (H)).    Microbiology Results Abnormal     Procedure Component Value - Date/Time    Blood Culture - Blood, Arm, Right [[id number]] Collected:  09/28/19 0429    Lab Status:  Preliminary result Specimen:  Blood from Arm, Right Updated:  10/02/19 0446     Blood Culture No growth at 4 days    Blood Culture - Blood, Hand, Right [[id number]] Collected:  09/28/19 0429    Lab Status:  Preliminary result Specimen:  Blood from Hand, Right Updated:  10/02/19 0446     Blood Culture No growth at 4 days    Blood Culture - Blood, Arm, Left [[id number]] Collected:  09/25/19 0415    Lab Status:  Final result Specimen:  Blood from Arm, Left Updated:  09/30/19 0631     Blood Culture No growth at 5 days    Blood Culture - Blood, Hand, Left [[id number]] Collected:  09/25/19 0420    Lab Status:  Final result Specimen:  Blood from Hand, Left Updated:  09/30/19 0631     Blood Culture No growth at 5 days    MRSA Screen, PCR - Swab, Nares [[id number]]  (Normal) Collected:  09/28/19 0659    Lab Status:  Final result Specimen:  Swab from Nares Updated:  09/28/19 1120     MRSA PCR Negative    Narrative:       MRSA Negative          Imaging Results (last 24 hours)     ** No results found for the last 24 hours. **          Results for orders placed during the hospital encounter of 09/24/19   Adult Transthoracic Echo Complete W/ Cont if Necessary Per Protocol (With Agitated Saline)    Narrative · Estimated EF = 70%.  · Left ventricular wall thickness is consistent with concentric    hypertrophy.  · Mild aortic valve stenosis is present.  · Mild mitral valve regurgitation is present          I have reviewed the medications:  Scheduled Meds:  amoxicillin-clavulanate 600 mg Oral Q12H   aspirin 81 mg Oral Daily   atorvastatin 80 mg Oral Nightly   budesonide-formoterol 2 puff Inhalation BID - RT   castor oil-balsam peru  Topical Q12H   cetirizine 10 mg Oral Nightly   clopidogrel 75 mg Oral Daily   docusate sodium 100 mg Oral BID   gabapentin 300 mg Oral Q8H   guaiFENesin 400 mg Oral Q8H   insulin lispro 0-7 Units Subcutaneous 4x Daily With Meals & Nightly   ipratropium-albuterol 3 mL Nebulization 4x Daily - RT   lactobacillus acidophilus 1 capsule Oral Daily   montelukast 10 mg Oral Nightly   pantoprazole 40 mg Oral BID AC   predniSONE 40 mg Oral Daily With Breakfast     Continuous Infusions:  dextrose 5 % and sodium chloride 0.9 % 50 mL/hr     PRN Meds:.•  acetaminophen **OR** acetaminophen  •  dextrose  •  dextrose  •  glucagon (human recombinant)  •  HYDROcodone-acetaminophen  •  ipratropium-albuterol  •  ondansetron **OR** ondansetron  •  sodium chloride  •  sodium chloride  •  tetrahydrozoline      Assessment/Plan   Assessment / Plan     Active Hospital Problems    Diagnosis  POA   • **Cerebrovascular accident (CVA) (CMS/[facility]) [I63.9]  Yes   • Tobacco abuse [Z72.0]  Yes   • CKD (chronic kidney disease), stage III (CMS/[facility]) [N18.3]  Yes   • Atrial flutter (CMS/[facility]) [I48.92]  Yes   • Generalized abdominal pain [R10.84]  Yes   • Intracranial vascular stenosis [I67.9]  Yes   • Hypertension [I10]  Yes   • HLD (hyperlipidemia) [E78.5]  Yes   • GERD (gastroesophageal reflux disease) [K21.9]  Yes   • DEA (generalized anxiety disorder) [F41.1]  Yes   • DM2 (diabetes mellitus, type 2) (CMS/[facility]) [E11.9]  Yes   • Dependence on supplemental oxygen [Z99.81]  Not Applicable   • COPD (chronic obstructive pulmonary disease) (CMS/[facility]) [J44.9]  Yes   • CAD (coronary artery disease) [I25.10]  Yes      Resolved  Hospital Problems   No resolved problems to display.        Brief Hospital Course to date:  Angela Berkowitz is a 84 y.o. female with a hx of COPD on 4L nasal canula at home, CAD, GERD, HTN, HLD, CKDIII, and hx of tobacco use presented to the ED with confusion. Admitted for further evaluation, which revealed CVA and acute anemia. Now improving, no longer confused and Hgb stable. Has had episodes of acute on chronic hypoxic respiratory failure, being treated for pneumonia with augmentin (to finish 7 day course) and volume overload with lasix 20 mg IV qd.      Acute hypoxic respiratory failure 2/2 HAP vs volume overload and Acute COPD exacerbation   Leukocytosis -improving   -CXR with small bilateral pleural effusions, and  Left>right lower lobe infiltrated and atelectasis  -BNP elevated; echo from 9/25 with EF 63-70%, LVH  -leukocytosis improved, will transition to augmentin po (end date 10/4)  - Intermittent hypoxia but overall improved  - Start lasix 20 mg PO daily and monitor renal function  - Continue inhalers   - Cont prednisone      COPD  -continue symbicort and duonebs  -continue supplemental oxygen to keep O2 saturation >88% at rest  -start IS and mucinex, started prednisone 40 mg qd on 10/1 x 5 days      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, discussed with patient and she does not want any intervention at this time     Goals of care: palliative care discussion     COLLEEN: Cr overall stable; monitor with resuming lasix      Facial numbness  Slurred speech, HX of CVA, intracranial stenosis  -MRI with small infarcts of R cerebral hemisphere  -CTA and perfusion: significant R ICA stenosis and M1 stenosis  -NSG consult-medical management with aspirin, plavix, and atorvastatin  -patient declined surgical intervention, will need follow-up in 1 month     Acute anemia  Generalized abdominal pain  -on admit day Hgb 5.9 without evidence/symptoms of active bleeding  -s/p2U PRBCs, H&H now stable   -continue PPI BID  -s/p GI  evaluation, patient declined intervention     Tobacco use disorder: counseled on importance of smoking cessation      Hx of a flutter: not on anticoagulation, rate controlled  HTN: holding lisinopril/HCTZ, CTM  CAD: continue ASA/statin, had intolerance to beta blockers in the past     DVT Prophylaxis:  Mechanical      Disposition: I expect the patient to be discharged in 1-2 days when respiratory status improves    CODE STATUS:   Code Status and Medical Interventions:   Ordered at: 09/24/19 3090     Limited Support to NOT Include:    Intubation     Level Of Support Discussed With:    Patient    Next of Kin (If No Surrogate)     Code Status:    No CPR     Medical Interventions (Level of Support Prior to Arrest):    Limited         Electronically signed by Gloria Pak DO, 10/02/19, 1:21 PM.

## 2019-10-02 NOTE — THERAPY TREATMENT NOTE
Patient Name: Angela Berkowitz  : 1935    MRN: [id number]                              Today's Date: 10/2/2019       Admit Date: 2019    Visit Dx:     ICD-10-CM ICD-9-CM   1. Acute CVA (cerebrovascular accident) (CMS/[facility]) I63.9 434.91   2. Left-sided weakness R53.1 728.87   3. Severe anemia D64.9 285.9   4. Impaired mobility and ADLs Z74.09 799.89   5. Pulmonary emphysema, unspecified emphysema type (CMS/[facility]) J43.9 492.8   6. CKD (chronic kidney disease), stage III (CMS/[facility]) N18.3 585.3   7. Pharyngoesophageal dysphagia R13.14 787.24     Patient Active Problem List   Diagnosis   • Biliary calculi   • COPD (chronic obstructive pulmonary disease) (CMS/[facility])   • CHF (congestive heart failure) (CMS/[facility])   • CAD (coronary artery disease)   • DM2 (diabetes mellitus, type 2) (CMS/[facility])   • Dependence on supplemental oxygen   • Diverticulosis   • GERD (gastroesophageal reflux disease)   • HLD (hyperlipidemia)   • Chronic low back pain   • History of myocardial infarction   • History of stroke   • DEA (generalized anxiety disorder)   • History of cancer of uterus   • Osteoarthritis of lumbar spine   • Hypertension   • Pernicious anemia   • Intracranial vascular stenosis   • Generalized abdominal pain   • Pain and swelling of right lower leg   • Arthritis   • Dyspnea   • Moderate episode of recurrent major depressive disorder (CMS/[facility])   • Cerebrovascular accident (CVA) (CMS/[facility])   • Tobacco abuse   • CKD (chronic kidney disease), stage III (CMS/[facility])   • Atrial flutter (CMS/[facility])     Past Medical History:   Diagnosis Date   • Arthritis    • Back pain    • CAD (coronary artery disease)    • Cancer (CMS/[facility])    • CHF (congestive heart failure) (CMS/[facility])    • Chronic low back pain    • COPD (chronic obstructive pulmonary disease) (CMS/[facility])    • Dependence on supplemental oxygen    • Diverticulosis    • DM2 (diabetes mellitus, type 2) (CMS/[facility])    • Dyslipidemia    • Dyspnea     Dyspnea on exertion/ chest pain   • DEA  (generalized anxiety disorder)    • GERD (gastroesophageal reflux disease)    • History of cancer of uterus     status post hysterectomy   • History of myocardial infarction    • History of stroke    • HLD (hyperlipidemia)    • Hypertension    • Nephrolithiasis    • Osteoarthritis of lumbar spine    • Pernicious anemia    • Stroke (CMS/HCC)    • Unsteady gait      Past Surgical History:   Procedure Laterality Date   • CARDIAC CATHETERIZATION N/A 8/1/2016    Procedure: Left Heart Cath;  Surgeon: Anupam Bernal MD;  Location: [facility] CATH INVASIVE LOCATION;  Service:    • CARDIAC SURGERY     • CHOLECYSTECTOMY     • COLONOSCOPY  09/2015   • CORONARY ARTERY BYPASS GRAFT  1997    x3 in 1997   • CORONARY STENT PLACEMENT  08/2016   • TOTAL ABDOMINAL HYSTERECTOMY      with removal of both ovaries   • UPPER GASTROINTESTINAL ENDOSCOPY  09/2015     General Information     Row Name 10/02/19 1026          PT Evaluation Time/Intention    Document Type  therapy note (daily note)  -CD     Mode of Treatment  physical therapy  -CD     Row Name 10/02/19 1026          General Information    Patient Profile Reviewed?  yes  -CD     Existing Precautions/Restrictions  oxygen therapy device and L/min;fall  -CD     Barriers to Rehab  medically complex;previous functional deficit  -CD     Row Name 10/02/19 1026          Cognitive Assessment/Intervention- PT/OT    Orientation Status (Cognition)  oriented x 3  -CD     Cognitive Assessment/Intervention Comment  FOLLOWS COMMANDS 100%  -CD     Row Name 10/02/19 1026          Safety Issues, Functional Mobility    Impairments Affecting Function (Mobility)  balance;endurance/activity tolerance;shortness of breath;strength  -CD     Comment, Safety Issues/Impairments (Mobility)  CUES FOR SAFETY WITH TRANSFERS, PLB WITH ACTIVITY.   -CD       User Key  (r) = Recorded By, (t) = Taken By, (c) = Cosigned By    Initials Name Provider Type    CD Alaina Urias PT Physical Therapist        Mobility     Row Name  10/02/19 1027          Bed Mobility Assessment/Treatment    Bed Mobility Assessment/Treatment  supine-sit  -CD     Supine-Sit [place] (Bed Mobility)  minimum assist (75% patient effort)  -CD     Assistive Device (Bed Mobility)  bed rails;head of bed elevated  -CD     Comment (Bed Mobility)  INCREASED TIME AND EFFORT.   -CD     Row Name 10/02/19 1027          Transfer Assessment/Treatment    Comment (Transfers)  CUES FOR HAND PLACEMENT.   -CD     Row Name 10/02/19 1027          Sit-Stand Transfer    Sit-Stand [place] (Transfers)  contact guard  -CD     Assistive Device (Sit-Stand Transfers)  walker, front-wheeled  -CD     Row Name 10/02/19 1027          Gait/Stairs Assessment/Training    Gait/Stairs Assessment/Training  gait/ambulation independence  -CD     [place] Level (Gait)  contact guard  -CD     Assistive Device (Gait)  walker, front-wheeled  -CD     Distance in Feet (Gait)  70  -CD     Deviations/Abnormal Patterns (Gait)  baldemar decreased;gait speed decreased;stride length decreased  -CD     Bilateral Gait Deviations  forward flexed posture;heel strike decreased  -CD     Comment (Gait/Stairs)  FOLLOWED WITH RECLINER. CUES FOR PLB AND UPRIGHT POSTURE. AMBULATED ON 4L O2.   -CD       User Key  (r) = Recorded By, (t) = Taken By, (c) = Cosigned By    Initials Name Provider Type    CD Alaina Urias PT Physical Therapist        Obj/Interventions     Row Name 10/02/19 1029          Static Sitting Balance    Level of [place] (Unsupported Sitting, Static Balance)  independent  -CD     Sitting Position (Unsupported Sitting, Static Balance)  sitting on edge of bed  -CD     Time Able to Maintain Position (Unsupported Sitting, Static Balance)  more than 5 minutes  -CD     Comment (Unsupported Sitting, Static Balance)  WORKED ON PLB AND UPRIGHT POSTURE.   -CD     Row Name 10/02/19 1029          Dynamic Sitting Balance    Level of [place], Reaches Outside Midline (Sitting, Dynamic Balance)   supervision  -CD     Sitting Position, Reaches Outside Midline (Sitting, Dynamic Balance)  sitting on edge of bed  -CD     Row Name 10/02/19 1029          Static Standing Balance    Level of [place] (Supported Standing, Static Balance)  contact guard assist  -CD     Assistive Device Utilized (Supported Standing, Static Balance)  walker, rolling  -CD     Row Name 10/02/19 1029          Dynamic Standing Balance    Level of [place], Reaches Outside Midline (Standing, Dynamic Balance)  minimal assist, 75% patient effort  -CD     Assistive Device Utilized (Supported Standing, Dynamic Balance)  walker, rolling  -CD     Comment, Reaches Outside Midline (Standing, Dynamic Balance)  GAIT IN [place]ART[place].   -CD       User Key  (r) = Recorded By, (t) = Taken By, (c) = Cosigned By    Initials Name Provider Type    CD Alaina Urias, PT Physical Therapist        Goals/Plan    No documentation.       Clinical Impression     Row Name 10/02/19 1030          Pain Assessment    Additional Documentation  Pain Scale: FACES Pre/Post-Treatment (Group)  -CD     Row Name 10/02/19 1030          Pain Scale: Numbers Pre/Post-Treatment    Pain Location - Side  Bilateral  -CD     Pain Location - Orientation  lower  -CD     Pain Location  extremity  -CD     Pain Intervention(s)  Ambulation/increased activity;Repositioned  -CD     Row Name 10/02/19 1030          Pain Scale: FACES Pre/Post-Treatment    Pain: FACES Scale, Pretreatment  2-->hurts little bit  -CD     Pain: FACES Scale, Post-Treatment  2-->hurts little bit  -CD     Row Name 10/02/19 1030          Plan of Care Review    Plan of Care Reviewed With  patient  -CD     Row Name 10/02/19 1030          Physical Therapy Clinical Impression    Patient/Family Goals Statement (PT Clinical Impression)  HOME WITH FAMILY AND HHPT  -CD     Criteria for Skilled Interventions Met (PT Clinical Impression)  yes  -CD     Rehab Potential (PT Clinical Summary)  good, to achieve stated therapy goals  -CD      Row Name 10/02/19 1030          Vital Signs    Pre SpO2 (%)  98  -CD     O2 Delivery Pre Treatment  supplemental O2  -CD     Post SpO2 (%)  98  -CD     O2 Delivery Post Treatment  supplemental O2  -CD     Pre Patient Position  Supine  -CD     Intra Patient Position  Standing  -CD     Post Patient Position  Sitting  -CD     Row Name 10/02/19 1030          Positioning and Restraints    Pre-Treatment Position  in bed  -CD     Post Treatment Position  chair  -CD     In Chair  reclined;call light within reach;encouraged to call for assist;exit alarm on;legs elevated;RUE elevated;LUE elevated;notified nsg  -CD       User Key  (r) = Recorded By, (t) = Taken By, (c) = Cosigned By    Initials Name Provider Type    CD Alaina Urias PT Physical Therapist        Outcome Measures     Row Name 10/02/19 1121          How much help from another person do you currently need...    Turning from your back to your side while in flat bed without using bedrails?  4  -CD     Moving from lying on back to sitting on the side of a flat bed without bedrails?  3  -CD     Moving to and from a bed to a chair (including a wheelchair)?  3  -CD     Standing up from a chair using your arms (e.g., wheelchair, bedside chair)?  3  -CD     Climbing 3-5 steps with a railing?  2  -CD     To walk in hospital room?  3  -CD     AM-PAC 6 Clicks Score (PT)  18  -CD     Row Name 10/02/19 1121          Modified [place] Scale    Modified [place] Scale  4 - Moderately severe disability.  Unable to walk without assistance, and unable to attend to own bodily needs without assistance.  -CD     Row Name 10/02/19 1121          Functional Assessment    Outcome Measure Options  AM-PAC 6 Clicks Basic Mobility (PT)  -CD       User Key  (r) = Recorded By, (t) = Taken By, (c) = Cosigned By    Initials Name Provider Type    CD Alaina Urias PT Physical Therapist        Physical Therapy Education     Title: PT OT SLP Therapies (In Progress)     Topic: Physical Therapy  (Done)     Point: Mobility training (Done)     Learning Progress Summary           Patient Acceptance, E, VU,NR by CD at 10/2/2019 11:19 AM    Comment:  See flowsheet.    Acceptance, E, VU by NS at 10/1/2019  3:43 PM    Acceptance, E, VU,NR by CD at 9/30/2019 10:43 AM    Comment:  SEE FLOWSHEET.    Acceptance, E, VU,NR by CD at 9/26/2019  2:34 PM    Comment:  SEE FLOWSHEET.    Acceptance, E, VU,NR by CD at 9/25/2019  2:10 PM    Comment:  BENEFITS OF OOB ACTIVITY, SAFETY WITH MOBILITY, PROGRESSION OF POC, D/CPLANNING,                   Point: Home exercise program (Done)     Learning Progress Summary           Patient Acceptance, E, VU,NR by CD at 10/2/2019 11:19 AM    Comment:  See flowsheet.    Acceptance, E, VU by NS at 10/1/2019  3:43 PM    Acceptance, E, VU,NR by CD at 9/30/2019 10:43 AM    Comment:  SEE FLOWSHEET.    Acceptance, E, VU,NR by CD at 9/26/2019  2:34 PM    Comment:  SEE FLOWSHEET.    Acceptance, E, VU,NR by CD at 9/25/2019  2:10 PM    Comment:  BENEFITS OF OOB ACTIVITY, SAFETY WITH MOBILITY, PROGRESSION OF POC, D/CPLANNING,                   Point: Body mechanics (Done)     Learning Progress Summary           Patient Acceptance, E, VU,NR by CD at 10/2/2019 11:19 AM    Comment:  See flowsheet.    Acceptance, E, VU by NS at 10/1/2019  3:43 PM    Acceptance, E, VU,NR by CD at 9/30/2019 10:43 AM    Comment:  SEE FLOWSHEET.    Acceptance, E, VU,NR by CD at 9/26/2019  2:34 PM    Comment:  SEE FLOWSHEET.    Acceptance, E, VU,NR by CD at 9/25/2019  2:10 PM    Comment:  BENEFITS OF OOB ACTIVITY, SAFETY WITH MOBILITY, PROGRESSION OF POC, D/CPLANNING,                   Point: Precautions (Done)     Learning Progress Summary           Patient Acceptance, E, VU,NR by CD at 10/2/2019 11:19 AM    Comment:  See flowsheet.    Acceptance, E, VU by NS at 10/1/2019  3:43 PM    Acceptance, E, VU,NR by CD at 9/30/2019 10:43 AM    Comment:  SEE FLOWSHEET.    Clem, AMERICA ROBERTSONNR by CD at 9/26/2019  2:34 PM    Comment:   SEE FLOWSHEET.    Acceptance, E, VU,NR by CD at 9/25/2019  2:10 PM    Comment:  BENEFITS OF OOB ACTIVITY, SAFETY WITH MOBILITY, PROGRESSION OF POC, D/CPLANNING,                               User Key     Initials Effective Dates Name Provider Type Discipline    CD 06/19/15 -  Alaina Urias, PT Physical Therapist PT    NS 09/10/19 -  Samantha Rivera PT Physical Therapist PT              PT Recommendation and Plan     Outcome Summary/Treatment Plan (PT)  Anticipated Discharge Disposition (PT): inpatient rehabilitation facility(BUT PT REFUSES. WANTS HOME WITH FAMILY AND HHPT. )  Plan of Care Reviewed With: patient  Progress: improving  Outcome Summary: INCREASED DISTANCE AMBULATED TO 70 FEET WITH R WALKER AND MIN ASSIST. PT LIMITED BY SOA, FATIGUE. O2 SATS STABLE ON 4L. PT NEEDS FREQUENT REST BREAKS. PLAN IS HOME WITH HHPT AND FAMILY ASSIST AS PT DECLINES IRF.      Time Calculation:   PT Charges     Row Name 10/02/19 1122             Time Calculation    Start Time  0900  -CD      PT Received On  10/02/19  -CD      PT Goal Re-Cert Due Date  10/05/19  -         Time Calculation- PT    Total Timed Code Minutes- PT  30 minute(s)  -CD         Timed Charges    24161 - Gait Training Minutes   15  -CD      21037 - PT Therapeutic Activity Minutes  15  -CD        User Key  (r) = Recorded By, (t) = Taken By, (c) = Cosigned By    Initials Name Provider Type    CD Alaina Urias, PT Physical Therapist        Therapy Charges for Today     Code Description Service Date Service Provider Modifiers Qty    04583816416 HC GAIT TRAINING EA 15 MIN 10/2/2019 Alaina Urias, PT GP 1    [id number] HC PT THERAPEUTIC ACT EA 15 MIN 10/2/2019 Alaina Urias, PT GP 1          PT G-Codes  Outcome Measure Options: AM-PAC 6 Clicks Basic Mobility (PT)  AM-PAC 6 Clicks Score (PT): 18  AM-PAC 6 Clicks Score (OT): 16  Modified [place] Scale: 4 - Moderately severe disability.  Unable to walk without assistance, and unable to attend to own bodily needs  without assistance.    Alaina Urias, PT  10/2/2019

## 2019-10-02 NOTE — PLAN OF CARE
Problem: Patient Care Overview  Goal: Plan of Care Review  Outcome: Ongoing (interventions implemented as appropriate)   10/02/19 1120   Coping/Psychosocial   Plan of Care Reviewed With patient   Plan of Care Review   Progress improving   OTHER   Outcome Summary INCREASED DISTANCE AMBULATED TO 70 FEET WITH R WALKER AND MIN ASSIST. PT LIMITED BY SOA, FATIGUE. O2 SATS STABLE ON 4L. PT NEEDS FREQUENT REST BREAKS. PLAN IS HOME WITH HHPT AND FAMILY ASSIST AS PT DECLINES IRF.

## 2019-10-02 NOTE — SIGNIFICANT NOTE
"    [facility] Medicine Services  CRITICAL CARE NOTE    Patient Name: Angela Berkowitz  : 1935  MRN: [id number]    Date of Admission: 2019  Length of Stay: 8  Subjective   Event/HPI:  Angela Berkowitz is an 83yo female with PMH significant for pernicious anemia, T2DM, HLD, depression, DEA, HTN, CAD, CHF, stroke, COPD, CKD, hx MI, tobacco abuse who was admitted on 2019 for a stroke.Patient is lying in bed and began having left-sided chest pain with radiation down her left arm.  Patient states that her pain hurts with cough, movement and palpation.  Patient denied any acute dyspnea, nausea, vomiting or diaphoresis.  Her \"normal\" chest pain.  Patient states she has had several MIs and bypass surgery.  NTG SL helped the patient's pain.  Her EKG stated Acute MI/STEMI.    Dr. Lamar, cardiology was called and asked to review EKG.  He is not concern for STEMI.  Agreed with trending troponins, check and labs and giving nitrates.  Said he be available to us if anything changed.  Objective   Vital Signs:   Temp:  [97.3 °F (36.3 °C)-98.6 °F (37 °C)] 98.6 °F (37 °C)  Heart Rate:  [] 81  Resp:  [16-24] 18  BP: (131-156)/(65-93) 150/65  Total (NIH Stroke Scale): 5  Pertinent Physical Exam:  Constitutional: Alert, WD, thin ill-appearing female in NAD  Eyes: EOMI, sclerae anicteric, no conjunctival injection  Head: NCAT  ENT: [place], moist mucous membranes   Respiratory: Non-labored, symmetrical chest expansion, CTAB  Cardiovascular: RRR, no M/R/G, +DP pulses bilaterally  Gastrointestinal: Soft, NT, ND +BS  Musculoskeletal: [place]O; no LE edema bilaterally  Neurologic: Oriented x4, strength symmetric in all extremities, follows all commands, speech clear  Skin: No rashes on exposed skin  Psychiatric: Pleasant and cooperative; normal affect    Results Reviewed:  I have personally reviewed current lab, radiology, and data and agree.  Results from last 7 days   Lab Units 10/02/19  0748 10/01/19  0520 " 09/30/19  0703   WBC 10*3/mm3 4.56 10.28 10.37   HEMOGLOBIN g/dL 8.2* 8.9* 9.0*   HEMATOCRIT % 29.7* 32.7* 33.6*   PLATELETS 10*3/mm3 232 213 127*     Results from last 7 days   Lab Units 10/02/19  0748 10/01/19  0520 09/30/19  0703   SODIUM mmol/L 145 144 142   POTASSIUM mmol/L 4.1 3.9 4.3   CHLORIDE mmol/L 107 106 105   CO2 mmol/L 31.0* 26.0 26.0   BUN mg/dL 21 21 24*   CREATININE mg/dL 1.22* 1.23* 1.44*   GLUCOSE mg/dL 145* 101* 81   CALCIUM mg/dL 8.6 8.6 8.9   ALT (SGPT) U/L <5 <5 <5   AST (SGOT) U/L 7 11 10     No results found for: BNP  No results found for: PHART, PCO2    Microbiology Results Abnormal     Procedure Component Value - Date/Time    Blood Culture - Blood, Arm, Right [[id number]] Collected:  09/28/19 0429    Lab Status:  Preliminary result Specimen:  Blood from Arm, Right Updated:  10/02/19 0446     Blood Culture No growth at 4 days    Blood Culture - Blood, Hand, Right [[id number]] Collected:  09/28/19 0429    Lab Status:  Preliminary result Specimen:  Blood from Hand, Right Updated:  10/02/19 0446     Blood Culture No growth at 4 days    Blood Culture - Blood, Arm, Left [[id number]] Collected:  09/25/19 0415    Lab Status:  Final result Specimen:  Blood from Arm, Left Updated:  09/30/19 0631     Blood Culture No growth at 5 days    Blood Culture - Blood, Hand, Left [[id number]] Collected:  09/25/19 0420    Lab Status:  Final result Specimen:  Blood from Hand, Left Updated:  09/30/19 0631     Blood Culture No growth at 5 days    MRSA Screen, PCR - Swab, Nares [[id number]]  (Normal) Collected:  09/28/19 0659    Lab Status:  Final result Specimen:  Swab from Nares Updated:  09/28/19 1120     MRSA PCR Negative    Narrative:       MRSA Negative        Imaging Results (last 24 hours)     Procedure Component Value Units Date/Time    XR Chest 1 View [[id number]] Collected:  10/02/19 1703     Updated:  10/02/19 1704    Narrative:          EXAMINATION: XR CHEST 1 VW-      INDICATION: chest pain; I63.9-Cerebral  infarction, unspecified;  R53.1-Weakness; D64.9-Anemia, unspecified; Z74.09-Other reduced  mobility; J43.9-Emphysema, unspecified; N18.3-Chronic kidney disease,  stage 3 (moderate); R13.14-Dysphagia, pharyngoesophageal phase      COMPARISON: Chest radiograph 10/01/2019     FINDINGS: Single portable chest radiograph is submitted for review. The  heart is enlarged, similar to prior. Similar-appearing extensive  calcification of the aorta. Postsurgical changes in mediastinum  identified. Multifocal airspace disease is noted probably involving the  left lower lobe with a small left pleural effusion, similar to prior.  Visualized upper abdomen is unrevealing. Diffuse osteopenia is present.  No acute osseous abnormality identified.       Impression:       Redemonstration of multifocal airspace disease in the  setting of chronic lung disease most pronounced involving the left lower  lobe with a small left pleural effusion.              Results for orders placed during the hospital encounter of 09/24/19   Adult Transthoracic Echo Complete W/ Cont if Necessary Per Protocol (With Agitated Saline)    Narrative · Estimated EF = 70%.  · Left ventricular wall thickness is consistent with concentric   hypertrophy.  · Mild aortic valve stenosis is present.  · Mild mitral valve regurgitation is present        I have reviewed the current medications.  Assessment / Plan     Active Hospital Problems    Diagnosis POA   • **Cerebrovascular accident (CVA) (CMS/HCC) [I63.9] Yes   • Chest pain [R07.9] Unknown   • Tobacco abuse [Z72.0] Yes   • CKD (chronic kidney disease), stage III (CMS/HCC) [N18.3] Yes   • Atrial flutter (CMS/HCC) [I48.92] Yes   • Generalized abdominal pain [R10.84] Yes   • Intracranial vascular stenosis [I67.9] Yes     8/3/16  carotid duplex greater than 70% left, 50-69 percent right ICA     • Hypertension [I10] Yes   • HLD (hyperlipidemia) [E78.5] Yes   • GERD (gastroesophageal reflux disease) [K21.9] Yes   • DEA  (generalized anxiety disorder) [F41.1] Yes   • DM2 (diabetes mellitus, type 2) (CMS/[facility]) [E11.9] Yes   • Dependence on supplemental oxygen [Z99.81] Not Applicable   • COPD (chronic obstructive pulmonary disease) (CMS/[facility]) [J44.9] Yes     With 24/7 oxygen therapy 2.5 to 3 liters     • CAD (coronary artery disease) [I25.10] Yes     A. CABG 1990s: LIMA to LAD, SVG to PDA, SVG to OM.  B. 11/2013, in STEMI with 2/3 patent grafts, stenting of the SVG to obtuse marginal with 4.0 x  23 mm I instructed to the stent.  C. 8/1/16 non-STEMI.  SVG to LCx 99 (2.5 x 23 Xience) LIMA patent RCA mild.  D. November 2013 echocardiogram. EF 55-60%. Moderate aortic sclerosis and mild tricuspid regurgitation  Sinus rhythm with LVH and diffuse ST changes ( new from EKG reviewed from November 2013) 01/22/14  New dynamic EKG changes         Angela Berkowitz is an 85yo female admitted on 9/24/2019 for a stroke. This evening, she was lying in bed and began having left-sided chest pain with radiation down her left arm.  Patient states that her pain hurts with cough, movement and palpation.  Patient denied any acute dyspnea, nausea, vomiting or diaphoresis.    Pertinent Assessment & Plan:   Chest pain  --EKG shows Acute MI/STEMI, 3:1 atrial flutter, HR 82  --Troponin, CBC, BMP  --Trend troponin  --EKG for Dr Lamar to review  --PRN EKG    CODE STATUS:    Code Status and Medical Interventions:   Ordered at: 09/24/19 1625     Limited Support to NOT Include:    Intubation     Level Of Support Discussed With:    Patient    Next of Kin (If No Surrogate)     Code Status:    No CPR     Medical Interventions (Level of Support Prior to Arrest):    Limited     Critical Care time spent in direct patient care:    45 minutes (excluding procedure time, if applicable) including high complexity decision making to assess and treat vital organ system failure in this individual who has impairment of one or more vital organ systems such that there is a high probability  of imminent or life threatening deterioration in the patient’s condition. Failure to initiate the above interventions on an urgent basis would likely result in sudden, clinically significant or life threatening deterioration in the patient's condition.    Electronically signed by MARIBEL Champagne, 10/02/19, 4:53 PM.

## 2019-10-02 NOTE — PLAN OF CARE
Problem: Patient Care Overview  Goal: Plan of Care Review  Outcome: Ongoing (interventions implemented as appropriate)   10/02/19 9539   Coping/Psychosocial   Plan of Care Reviewed With patient   OTHER   Outcome Summary Alert and oriented. SOA with activity. 4L cannula baseline. Aflutter monitor. New onset left anterior chest pain after bath, bedside commode, and transfer from recliner to bed. Rapid response called, Dr Pak notified, and chest pain workup initiated. Chest pain improved after Nitro SL x 1 and rest. PRN norco given once for bilateral leg pain.  Son bedside for visit.  Currently sleep at shift change and no obvious distress.

## 2019-10-03 PROBLEM — R07.9 CHEST PAIN: Status: RESOLVED | Noted: 2019-01-01 | Resolved: 2019-01-01

## 2019-10-03 NOTE — PLAN OF CARE
Problem: Patient Care Overview  Goal: Plan of Care Review  Outcome: Ongoing (interventions implemented as appropriate)   10/03/19 1036   Coping/Psychosocial   Plan of Care Reviewed With patient   Plan of Care Review   Progress improving   OTHER   Outcome Summary PT CONTINUES TO PROGRESS TOWARD GOALS. INCREASED DISTANCE AMBULATED  FFET WITH R WALKER AND CGA. REQUIRES CGA FOR TRANSFERS. CONTINUES WITH SOA WITH ACTIVITY ON 4L O2. PLAN IS FOR D/C HOME WITH SON AND HHPT.        Problem: Stroke (Ischemic) (Adult)  Goal: Signs and Symptoms of Listed Potential Problems Will be Absent, Minimized or Managed (Stroke)  Outcome: Ongoing (interventions implemented as appropriate)   10/03/19 1036   Goal/Outcome Evaluation   Problems Assessed (Stroke (Ischemic)) cognitive impairment;communication impairment;motor/sensory impairment   Problems Assessed (Stroke (Ischemic)) motor/sensory impairment

## 2019-10-03 NOTE — THERAPY TREATMENT NOTE
Patient Name: Angela Berkowitz  : 1935    MRN: [id number]                              Today's Date: 10/3/2019       Admit Date: 2019    Visit Dx:     ICD-10-CM ICD-9-CM   1. Acute CVA (cerebrovascular accident) (CMS/[facility]) I63.9 434.91   2. Left-sided weakness R53.1 728.87   3. Severe anemia D64.9 285.9   4. Impaired mobility and ADLs Z74.09 799.89   5. Pulmonary emphysema, unspecified emphysema type (CMS/[facility]) J43.9 492.8   6. CKD (chronic kidney disease), stage III (CMS/[facility]) N18.3 585.3   7. Pharyngoesophageal dysphagia R13.14 787.24     Patient Active Problem List   Diagnosis   • Biliary calculi   • COPD (chronic obstructive pulmonary disease) (CMS/[facility])   • CHF (congestive heart failure) (CMS/[facility])   • CAD (coronary artery disease)   • DM2 (diabetes mellitus, type 2) (CMS/[facility])   • Dependence on supplemental oxygen   • Diverticulosis   • GERD (gastroesophageal reflux disease)   • HLD (hyperlipidemia)   • Chronic low back pain   • History of myocardial infarction   • History of stroke   • DEA (generalized anxiety disorder)   • History of cancer of uterus   • Osteoarthritis of lumbar spine   • Hypertension   • Pernicious anemia   • Intracranial vascular stenosis   • Generalized abdominal pain   • Pain and swelling of right lower leg   • Arthritis   • Dyspnea   • Moderate episode of recurrent major depressive disorder (CMS/[facility])   • Cerebrovascular accident (CVA) (CMS/[facility])   • Tobacco abuse   • CKD (chronic kidney disease), stage III (CMS/[facility])   • Atrial flutter (CMS/[facility])   • Chest pain     Past Medical History:   Diagnosis Date   • Arthritis    • Back pain    • CAD (coronary artery disease)    • Cancer (CMS/[facility])    • CHF (congestive heart failure) (CMS/[facility])    • Chronic low back pain    • COPD (chronic obstructive pulmonary disease) (CMS/[facility])    • Dependence on supplemental oxygen    • Diverticulosis    • DM2 (diabetes mellitus, type 2) (CMS/[facility])    • Dyslipidemia    • Dyspnea     Dyspnea on exertion/ chest  pain   • DEA (generalized anxiety disorder)    • GERD (gastroesophageal reflux disease)    • History of cancer of uterus     status post hysterectomy   • History of myocardial infarction    • History of stroke    • HLD (hyperlipidemia)    • Hypertension    • Nephrolithiasis    • Osteoarthritis of lumbar spine    • Pernicious anemia    • Stroke (CMS/HCC)    • Unsteady gait      Past Surgical History:   Procedure Laterality Date   • CARDIAC CATHETERIZATION N/A 8/1/2016    Procedure: Left Heart Cath;  Surgeon: Anupam Bernal MD;  Location: [facility] CATH INVASIVE LOCATION;  Service:    • CARDIAC SURGERY     • CHOLECYSTECTOMY     • COLONOSCOPY  09/2015   • CORONARY ARTERY BYPASS GRAFT  1997    x3 in 1997   • CORONARY STENT PLACEMENT  08/2016   • TOTAL ABDOMINAL HYSTERECTOMY      with removal of both ovaries   • UPPER GASTROINTESTINAL ENDOSCOPY  09/2015     General Information     Row Name 10/03/19 1031          PT Evaluation Time/Intention    Document Type  therapy note (daily note)  -CD     Mode of Treatment  physical therapy  -CD     Row Name 10/03/19 1031          General Information    Patient Profile Reviewed?  yes  -CD     Existing Precautions/Restrictions  oxygen therapy device and L/min;fall  -CD     Row Name 10/03/19 1031          Cognitive Assessment/Intervention- PT/OT    Orientation Status (Cognition)  oriented x 3  -CD     Row Name 10/03/19 1031          Safety Issues, Functional Mobility    Safety Issues Affecting Function (Mobility)  safety precaution awareness;safety precautions follow-through/compliance  -CD     Impairments Affecting Function (Mobility)  balance;endurance/activity tolerance;strength  -CD     Comment, Safety Issues/Impairments (Mobility)  CUES FOR PLB WITH ACTIVITY.   -CD       User Key  (r) = Recorded By, (t) = Taken By, (c) = Cosigned By    Initials Name Provider Type    CD Alaina Urias, PT Physical Therapist        Mobility     Row Name 10/03/19 1032          Bed Mobility  Assessment/Treatment    Comment (Bed Mobility)  PT [facility].   -CD     Row Name 10/03/19 1032          Transfer Assessment/Treatment    Comment (Transfers)  CUES FOR HAND PLACEMENT.  -CD     Row Name 10/03/19 1032          Sit-Stand Transfer    Sit-Stand [place] (Transfers)  contact guard  -CD     Assistive Device (Sit-Stand Transfers)  walker, front-wheeled  -CD     Row Name 10/03/19 1032          Gait/Stairs Assessment/Training    [place] Level (Gait)  contact guard  -CD     Assistive Device (Gait)  walker, front-wheeled  -CD     Distance in Feet (Gait)  102  -CD     Deviations/Abnormal Patterns (Gait)  stride length decreased;gait speed decreased  -CD     Bilateral Gait Deviations  forward flexed posture  -CD     Comment (Gait/Stairs)  FOLLOWED WITH RECLINER. CUES FOR UPRIGHT POSTURE. AMBULATED ON 4L O2.   -CD       User Key  (r) = Recorded By, (t) = Taken By, (c) = Cosigned By    Initials Name Provider Type    Alaina Herrera, PT Physical Therapist        Obj/Interventions     Row Name 10/03/19 1033          Static Standing Balance    Level of [place] (Supported Standing, Static Balance)  contact guard assist  -CD     Assistive Device Utilized (Supported Standing, Static Balance)  walker, rolling  -CD     Row Name 10/03/19 1033          Dynamic Standing Balance    Level of [place], Reaches Outside Midline (Standing, Dynamic Balance)  contact guard assist  -CD     Assistive Device Utilized (Supported Standing, Dynamic Balance)  walker, rolling  -CD     Comment, Reaches Outside Midline (Standing, Dynamic Balance)  GAIT IN MARTINEZ.   -CD       User Key  (r) = Recorded By, (t) = Taken By, (c) = Cosigned By    Initials Name Provider Type    Alaina Herrera, PT Physical Therapist        Goals/Plan    No documentation.       Clinical Impression     Row Name 10/03/19 1034          Pain Scale: Numbers Pre/Post-Treatment    Pain Location - Orientation  generalized  -CD     Pain Intervention(s)   Ambulation/increased activity;Repositioned  -CD     Row Name 10/03/19 1034          Pain Scale: FACES Pre/Post-Treatment    Pain: FACES Scale, Pretreatment  2-->hurts little bit  -CD     Pain: FACES Scale, Post-Treatment  2-->hurts little bit  -CD     Row Name 10/03/19 1034          Plan of Care Review    Plan of Care Reviewed With  patient  -CD     Row Name 10/03/19 1034          Physical Therapy Clinical Impression    Patient/Family Goals Statement (PT Clinical Impression)  HOME WITH FAMILY AND HHPT.   -CD     Criteria for Skilled Interventions Met (PT Clinical Impression)  yes  -CD     Rehab Potential (PT Clinical Summary)  good, to achieve stated therapy goals  -CD     Row Name 10/03/19 1034          Vital Signs    Pre SpO2 (%)  97  -CD     O2 Delivery Pre Treatment  supplemental O2  -CD     Post SpO2 (%)  97  -CD     O2 Delivery Post Treatment  supplemental O2  -CD     Pre Patient Position  Sitting  -CD     Intra Patient Position  Standing  -CD     Post Patient Position  Sitting  -CD     Row Name 10/03/19 1034          Positioning and Restraints    Pre-Treatment Position  sitting in chair/recliner  -CD     Post Treatment Position  chair  -CD     In Chair  reclined;call light within reach;encouraged to call for assist;legs elevated  -CD       User Key  (r) = Recorded By, (t) = Taken By, (c) = Cosigned By    Initials Name Provider Type    CD Alaina Urias, PT Physical Therapist        Outcome Measures     Row Name 10/03/19 1037          How much help from another person do you currently need...    Turning from your back to your side while in flat bed without using bedrails?  4  -CD     Moving from lying on back to sitting on the side of a flat bed without bedrails?  3  -CD     Moving to and from a bed to a chair (including a wheelchair)?  3  -CD     Standing up from a chair using your arms (e.g., wheelchair, bedside chair)?  3  -CD     Climbing 3-5 steps with a railing?  3  -CD     To walk in hospital room?  3   -CD     AM-PAC 6 Clicks Score (PT)  19  -CD     Row Name 10/03/19 1037          Modified [place] Scale    Modified [place] Scale  3 - Moderate disability.  Requiring some help, but able to walk without assistance. WITH R WALKER.   -CD       User Key  (r) = Recorded By, (t) = Taken By, (c) = Cosigned By    Initials Name Provider Type    Alaina Herrera PT Physical Therapist        Physical Therapy Education     Title: PT OT SLP Therapies (In Progress)     Topic: Physical Therapy (Done)     Point: Mobility training (Done)     Learning Progress Summary           Patient Acceptance, E, VU by CD at 10/3/2019 10:35 AM    Comment:  SEE FLOWSHEET.    Acceptance, E, VU,NR by CD at 10/2/2019 11:19 AM    Comment:  See flowsheet.    Acceptance, E, VU by NS at 10/1/2019  3:43 PM    Acceptance, E, VU,NR by CD at 9/30/2019 10:43 AM    Comment:  SEE FLOWSHEET.    Acceptance, E, VU,NR by CD at 9/26/2019  2:34 PM    Comment:  SEE FLOWSHEET.    Acceptance, E, VU,NR by CD at 9/25/2019  2:10 PM    Comment:  BENEFITS OF OOB ACTIVITY, SAFETY WITH MOBILITY, PROGRESSION OF POC, D/CPLANNING,                   Point: Home exercise program (Done)     Learning Progress Summary           Patient Acceptance, E, VU by CD at 10/3/2019 10:35 AM    Comment:  SEE FLOWSHEET.    Acceptance, E, VU,NR by CD at 10/2/2019 11:19 AM    Comment:  See flowsheet.    Acceptance, E, VU by NS at 10/1/2019  3:43 PM    Acceptance, E, VU,NR by CD at 9/30/2019 10:43 AM    Comment:  SEE FLOWSHEET.    Acceptance, E, VU,NR by CD at 9/26/2019  2:34 PM    Comment:  SEE FLOWSHEET.    Acceptance, E, VU,NR by CD at 9/25/2019  2:10 PM    Comment:  BENEFITS OF OOB ACTIVITY, SAFETY WITH MOBILITY, PROGRESSION OF POC, D/CPLANNING,                   Point: Body mechanics (Done)     Learning Progress Summary           Patient Acceptance, E, VU by CD at 10/3/2019 10:35 AM    Comment:  SEE FLOWSHEET.    Acceptance, E, VU,NR by CD at 10/2/2019 11:19 AM    Comment:  See flowsheet.     Acceptance, E, VU by NS at 10/1/2019  3:43 PM    Acceptance, E, VU,NR by CD at 9/30/2019 10:43 AM    Comment:  SEE FLOWSHEET.    Acceptance, E, VU,NR by CD at 9/26/2019  2:34 PM    Comment:  SEE FLOWSHEET.    Acceptance, E, VU,NR by CD at 9/25/2019  2:10 PM    Comment:  BENEFITS OF OOB ACTIVITY, SAFETY WITH MOBILITY, PROGRESSION OF POC, D/CPLANNING,                   Point: Precautions (Done)     Learning Progress Summary           Patient Acceptance, E, VU by CD at 10/3/2019 10:35 AM    Comment:  SEE FLOWSHEET.    Acceptance, E, VU,NR by CD at 10/2/2019 11:19 AM    Comment:  See flowsheet.    Acceptance, E, VU by NS at 10/1/2019  3:43 PM    Acceptance, E, VU,NR by CD at 9/30/2019 10:43 AM    Comment:  SEE FLOWSHEET.    Acceptance, E, VU,NR by CD at 9/26/2019  2:34 PM    Comment:  SEE FLOWSHEET.    Acceptance, E, VU,NR by CD at 9/25/2019  2:10 PM    Comment:  BENEFITS OF OOB ACTIVITY, SAFETY WITH MOBILITY, PROGRESSION OF POC, D/CPLANNING,                               User Key     Initials Effective Dates Name Provider Type Discipline    CD 06/19/15 -  Alaina Urias, PT Physical Therapist PT    NS 09/10/19 -  Samantha Rivera PT Physical Therapist PT              PT Recommendation and Plan     Outcome Summary/Treatment Plan (PT)  Anticipated Discharge Disposition (PT): home with assist, home with home health(PT DECLINED IRF. )  Plan of Care Reviewed With: patient  Progress: improving  Outcome Summary: PT CONTINUES TO PROGRESS TOWARD GOALS. INCREASED DISTANCE AMBULATED  FFET WITH R WALKER AND CGA. REQUIRES CGA FOR TRANSFERS. CONTINUES WITH SOA WITH ACTIVITY ON 4L O2. PLAN IS FOR D/C HOME WITH SON AND HHPT.      Time Calculation:   PT Charges     Row Name 10/03/19 1037             Time Calculation    Start Time  0950  -      PT Received On  10/03/19  -      PT Goal Re-Cert Due Date  10/05/19  -         Time Calculation- PT    Total Timed Code Minutes- PT  30 minute(s)  -CD         Timed Charges    18242 -  Gait Training Minutes   30  -CD        User Key  (r) = Recorded By, (t) = Taken By, (c) = Cosigned By    Initials Name Provider Type    CD Alaina Urias, PT Physical Therapist        Therapy Charges for Today     Code Description Service Date Service Provider Modifiers Qty    02889769179 HC GAIT TRAINING EA 15 MIN 10/2/2019 Alaina Urias, PT GP 1    03276072974 HC PT THERAPEUTIC ACT EA 15 MIN 10/2/2019 Alaina Urias, PT GP 1    96064338495 HC GAIT TRAINING EA 15 MIN 10/3/2019 Alaina Urias, PT GP 2          PT G-Codes  Outcome Measure Options: AM-PAC 6 Clicks Basic Mobility (PT)  AM-PAC 6 Clicks Score (PT): 19  AM-PAC 6 Clicks Score (OT): 16  Modified [place] Scale: 3 - Moderate disability.  Requiring some help, but able to walk without assistance.(WITH R WALKER. )    Alaina Urias, PT  10/3/2019

## 2019-10-03 NOTE — DISCHARGE SUMMARY
[facility] Medicine Services  DISCHARGE SUMMARY    Patient Name: Angela Berkowitz  : 1935  MRN: [id number]    Date of Admission: 2019  Date of Discharge:  10/03/19  Primary Care Physician: Gilmar Campos MD    Consults     Date and Time Order Name Status Description    2019 1505 Inpatient Palliative Care MD Consult Completed     2019 1831 Inpatient Gastroenterology Consult Completed     2019 1246 Inpatient Neurology Consult Stroke            Hospital Course     Presenting Problem:   Cerebrovascular accident (CVA), unspecified mechanism (CMS/[facility]) [I63.9]    Active Hospital Problems    Diagnosis  POA   • **Cerebrovascular accident (CVA) (CMS/[facility]) [I63.9]  Yes   • Tobacco abuse [Z72.0]  Yes   • CKD (chronic kidney disease), stage III (CMS/[facility]) [N18.3]  Yes   • Atrial flutter (CMS/[facility]) [I48.92]  Yes   • Generalized abdominal pain [R10.84]  Yes   • Intracranial vascular stenosis [I67.9]  Yes   • Hypertension [I10]  Yes   • HLD (hyperlipidemia) [E78.5]  Yes   • GERD (gastroesophageal reflux disease) [K21.9]  Yes   • DEA (generalized anxiety disorder) [F41.1]  Yes   • DM2 (diabetes mellitus, type 2) (CMS/[facility]) [E11.9]  Yes   • Dependence on supplemental oxygen [Z99.81]  Not Applicable   • COPD (chronic obstructive pulmonary disease) (CMS/[facility]) [J44.9]  Yes   • CAD (coronary artery disease) [I25.10]  Yes      Resolved Hospital Problems    Diagnosis Date Resolved POA   • Chest pain [R07.9] 10/03/2019 No          Hospital Course:  Angela Berkowitz is a 84 y.o. female with a hx of COPD on 4L nasal canula at home, CAD, GERD, HTN, HLD, CKDIII, and hx of tobacco use presented to the ED with confusion. Admitted for further evaluation, which revealed CVA and acute anemia. Now improving, no longer confused and Hgb stable. Has had episodes of acute on chronic hypoxic respiratory failure, being treated for pneumonia with augmentin (to finish 7 day course) and volume overload with lasix  20 mg IV qd.      Acute hypoxic respiratory failure 2/2 HAP vs volume overload and Acute COPD exacerbation   Leukocytosis   -CXR with small bilateral pleural effusions, and  Left>right lower lobe infiltrated and atelectasis  -BNP elevated; echo from 9/25 with EF 63-70%, LVH  - Patient was started on antibiotics and transitioned to augmentin to complete 7 days   - She was started on PO lasix and kidney function tolerated   - She will resume home inhalers and short course of prednisone started  - patient had been on her home O2 for 2 days prior to DC with no episodes of hypoxia     COPD  - continue symbicort and duonebs. 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 Recommend BMP in 1 week with PCP with starting lasix      Facial numbness  Slurred speech, HX of CVA, intracranial stenosis  -MRI with small infarcts of R cerebral hemisphere  -CTA and perfusion: significant R ICA stenosis and M1 stenosis  -NSG consulted; patient declined intervention and recommended medical management with aspirin, plavix, and atorvastatin  - Follow up with neurosurgery in 1 month      Acute anemia  Generalized abdominal pain  - on admit day Hgb 5.9 without evidence/symptoms of active bleeding. Patient was evaluated by GI and declined intervention. She was transfused 2 units with Hgb remaining stable.   - Recommend repeating CBC in 1 week and then periodically thereafter      Tobacco use disorder: counseled on importance of smoking cessation. Patient states she plans on quitting      Hx of a flutter: not on anticoagulation on admission and recent GI bleed, rate controlled with no AV deysi blocker  HTN: holding lisinopril/HCTZ with stable BP. Will continue to hold on  discharge.   CAD: continued ASA/plavix/statin, had intolerance to beta blockers in the past    ** Discussed the importance of PCP follow up regarding her medical conditions     Discharge Follow Up Recommendations for labs/diagnostics:  PCP 1 week -- recommend repeat BMP and CBC   Neurosurgery 1 month     Day of Discharge     HPI:   Chest pain last night. Troponin checked and flat. CP improved with nitro and currently improved. States breathing and cough continue to improve. She would like to go home and feels comfortable and ready for discharge.     Review of Systems  Gen- No fevers, chills  CV- No chest pain, palpitations  Resp- No cough, + dyspnea  GI- No N/V/D, abd pain    Otherwise ROS is negative except as mentioned in the HPI.    Vital Signs:   Temp:  [97.3 °F (36.3 °C)-98.6 °F (37 °C)] 98 °F (36.7 °C)  Heart Rate:  [80-97] 93  Resp:  [16-18] 18  BP: (143-169)/(65-90) 158/77     Physical Exam:  Constitutional: No acute distress, chronically ill appearing   HENT: NCAT, mucous membranes moist  Respiratory: still with wheeze and rhonchi but improved, respiratory effort normal at rest   Cardiovascular: RRR, II/VI murmurs, no rubs, or gallops, palpable pedal pulses bilaterally  Gastrointestinal: Positive bowel sounds, soft, nontender, nondistended  Musculoskeletal: No bilateral ankle edema  Psychiatric: Appropriate affect, cooperative  Neurologic: Oriented x 3, strength symmetric in all extremities, Cranial Nerves grossly intact to confrontation, speech clear  Skin: No rashes      Pertinent  and/or Most Recent Results     Results from last 7 days   Lab Units 10/03/19  0512 10/02/19  1637 10/02/19  0748 10/01/19  0520 09/30/19  0703 09/29/19  0520 09/28/19  0429 09/27/19  0755   WBC 10*3/mm3 5.08 4.68 4.56 10.28 10.37 14.68* 7.80 7.57   HEMOGLOBIN g/dL 8.0* 9.1* 8.2* 8.9* 9.0* 8.3* 8.2* 8.8*   HEMATOCRIT % 28.8* 33.4* 29.7* 32.7* 33.6* 30.7* 30.8* 31.4*   PLATELETS 10*3/mm3 242 273 232 213 127* 195 219 224   SODIUM  mmol/L 146*  --  145 144 142 143 145 144   POTASSIUM mmol/L 4.2  --  4.1 3.9 4.3 4.9 4.9 4.6   CHLORIDE mmol/L 108*  --  107 106 105 108* 110* 108*   CO2 mmol/L 31.0*  --  31.0* 26.0 26.0 24.0 26.0 27.0   BUN mg/dL 23  --  21 21 24* 21 16 15   CREATININE mg/dL 1.24*  --  1.22* 1.23* 1.44* 1.41* 1.09* 0.93   GLUCOSE mg/dL 106*  --  145* 101* 81 86 107* 88   CALCIUM mg/dL 9.1  --  8.6 8.6 8.9 8.7 8.4* 8.8     Results from last 7 days   Lab Units 10/02/19  0748 10/01/19  0520 09/30/19  0703 09/28/19  0429   BILIRUBIN mg/dL 0.4 0.4 0.6 0.5   ALK PHOS U/L 59 66 69 67   ALT (SGPT) U/L <5 <5 <5 5   AST (SGOT) U/L 7 11 10 8           Invalid input(s): TG, LDLCALC, LDLREALC  Results from last 7 days   Lab Units 10/02/19  2324 10/02/19  1638 10/01/19  0520 09/28/19  0429   PROBNP pg/mL  --   --  24,037.0* 22,352.0*   TROPONIN T ng/mL 0.016 0.015  --   --    PROCALCITONIN ng/mL  --   --   --  0.16   LACTATE mmol/L  --   --  0.8 0.6       Brief Urine Lab Results  (Last result in the past 365 days)      Color   Clarity   Blood   Leuk Est   Nitrite   Protein   CREAT   Urine HCG        09/28/19 0411 Yellow Clear Negative Negative Negative Trace               Microbiology Results Abnormal     Procedure Component Value - Date/Time    Blood Culture - Blood, Arm, Right [[id number]] Collected:  09/28/19 0429    Lab Status:  Final result Specimen:  Blood from Arm, Right Updated:  10/03/19 0500     Blood Culture No growth at 5 days    Blood Culture - Blood, Hand, Right [[id number]] Collected:  09/28/19 0429    Lab Status:  Final result Specimen:  Blood from Hand, Right Updated:  10/03/19 0500     Blood Culture No growth at 5 days    Blood Culture - Blood, Arm, Left [[id number]] Collected:  09/25/19 0415    Lab Status:  Final result Specimen:  Blood from Arm, Left Updated:  09/30/19 0631     Blood Culture No growth at 5 days    Blood Culture - Blood, Hand, Left [[id number]] Collected:  09/25/19 0420    Lab Status:  Final result Specimen:   Blood from Hand, Left Updated:  09/30/19 0631     Blood Culture No growth at 5 days    MRSA Screen, PCR - Swab, Nares [[id number]]  (Normal) Collected:  09/28/19 0659    Lab Status:  Final result Specimen:  Swab from Nares Updated:  09/28/19 1120     MRSA PCR Negative    Narrative:       MRSA Negative          Imaging Results (all)     Procedure Component Value Units Date/Time    XR Chest 1 View [[id number]] Collected:  10/02/19 1703     Updated:  10/02/19 1951    Narrative:          EXAMINATION: XR CHEST 1 VW - 10/02/2019     INDICATION: I63.9-Cerebral infarction, unspecified; R53.1-Weakness;  D64.9-Anemia, unspecified; Z74.09-Other reduced mobility;  J43.9-Emphysema, unspecified; N18.3-Chronic kidney disease, stage 3  (moderate); R13.14-Dysphagia, pharyngoesophageal phase.      COMPARISON: Chest radiograph 10/01/2019.     FINDINGS: Single portable chest radiograph is submitted for review. The  heart is enlarged, similar to prior. Similar-appearing extensive  calcification of the aorta. Postsurgical changes in mediastinum  identified. Multifocal airspace disease is noted predominantly involving  the left lower lobe with a small left pleural effusion, similar to  prior. Visualized upper abdomen is unrevealing. Diffuse osteopenia is  present. No acute osseous abnormality identified.       Impression:       Redemonstration of multifocal airspace disease in the  setting of chronic lung disease most pronounced involving the left lower  lobe with a small left pleural effusion.     DICTATED:   10/02/2019  EDITED/ls :   10/02/2019        XR Chest 1 View [[id number]] Collected:  10/01/19 0527     Updated:  10/01/19 0529    Narrative:       CR Chest 1 Vw    INDICATION:   Shortness of breath, COPD     COMPARISON:    9/28/2019    FINDINGS:  Single portable AP view(s) of the chest.        Extensive bilateral interstitial infiltrates are present. Is a small left pleural effusion and a tiny right pleural effusion. Heart size is  mildly prominent       Impression:       Slight increase in bilateral diffuse infiltrates as compared with 9/28/2019. Small left greater than right effusions    Signer Name: Ishmael Mortensen MD   Signed: 10/1/2019 5:27 AM   Workstation Name: [email or web address]    [facility]    FL Video Swallow With Speech [[id number]] Collected:  09/30/19 1417     Updated:  09/30/19 1703    Narrative:       EXAMINATION: FL VIDEO SWALLOW W SPEECH-, FL LIMITED UGI FOR MBS REFLUX-     INDICATION: DYSPHAGIA, OROPHARYNGEAL, HAS ATTRIBUTABLE CAUSE     TECHNIQUE: 1 minute and 20 seconds of fluoroscopic time was used for  this exam. 2 associated images were saved. The patient was evaluated in  the seated lateral position while taking a variety of consistencies of  barium by mouth under the direction of speech pathology.     COMPARISON: NONE     FINDINGS: There was no penetration and no aspiration with any of the  media ingested. A limited view the esophagus with the patient in the  seated lateral position demonstrated no signs of a focal esophageal  stricture, or gastroesophageal reflux. There is a small sized Zenker's  diverticulum visible in the area of the C6 vertebrae. This diverticulum  does not appear to retain a significant amount of contrast after  swallows.          Impression:       1. Fluoroscopy provided for a modified barium swallow. Please see speech  therapy report for full details and recommendations.  2. Limited upper GI series demonstrated a small sized Zenker's  diverticulum.         This report was finalized on 9/30/2019 5:00 PM by Dr. Lan Rivera.       FL Limited Ugi For Mbs Reflux [[id number]] Collected:  09/30/19 1417     Updated:  09/30/19 1703    Narrative:       EXAMINATION: FL VIDEO SWALLOW W SPEECH-, FL LIMITED UGI FOR MBS REFLUX-     INDICATION: DYSPHAGIA, OROPHARYNGEAL, HAS ATTRIBUTABLE CAUSE     TECHNIQUE: 1 minute and 20 seconds of fluoroscopic time was used for  this exam. 2 associated images  were saved. The patient was evaluated in  the seated lateral position while taking a variety of consistencies of  barium by mouth under the direction of speech pathology.     COMPARISON: NONE     FINDINGS: There was no penetration and no aspiration with any of the  media ingested. A limited view the esophagus with the patient in the  seated lateral position demonstrated no signs of a focal esophageal  stricture, or gastroesophageal reflux. There is a small sized Zenker's  diverticulum visible in the area of the C6 vertebrae. This diverticulum  does not appear to retain a significant amount of contrast after  swallows.          Impression:       1. Fluoroscopy provided for a modified barium swallow. Please see speech  therapy report for full details and recommendations.  2. Limited upper GI series demonstrated a small sized Zenker's  diverticulum.         This report was finalized on 9/30/2019 5:00 PM by Dr. Lan Rivera.       XR Chest 1 View [[id number]] Collected:  09/28/19 0411     Updated:  09/28/19 0414    Narrative:       CR Chest 1 Vw    INDICATION:   Cough and weakness. Productive cough for several days     COMPARISON:    [id number]    FINDINGS:  Single portable AP view(s) of the chest.        Is a small right effusion and there is a small left effusion with left base infiltrate or atelectasis. Is also right base atelectasis. Chronic interstitial prominence is present. The heart size upper limits normal. There are sternotomy wires present       Impression:       Bilateral small pleural effusions are present with left greater than right lower lobe infiltrates and atelectasis.    Signer Name: Ihsmael Mortensen MD   Signed: 9/28/2019 4:11 AM   Workstation Name: [email or web address]    [facility]    CT Cerebral Perfusion With & Without Contrast [[id number]] Collected:  09/24/19 4304     Updated:  09/25/19 0910    Narrative:       CT CEREBRAL PERFUSION W WO CONTRAST, CTA Neck, CTA Head     INDICATION:     84-year-old male with a history of stroke. Slurred speech today. Prior MRI earlier this evening demonstrated probable sequela of watershed infarcts in the right cerebral hemisphere involving the frontal lobe and right parietal white matter.    Head and neck CTA:    Neck - Heavy atherosclerotic change in the anterior and posterior circulation. The common carotid arteries, internal carotid arteries and vertebral arteries appear patent. There is advanced narrowing of the carotid system on the right at the carotid  bifurcation with estimated luminal narrowing on the order of at least 75-80%. Narrowing on the left is on the order of about 30-40%. No apparent dissection.     Head - There is heavy atherosclerotic change of the internal carotid arteries bilaterally distal to the cervical level extending into the skull base and middle cerebral arteries bilaterally, but there is no distinct vessel cut off. There is suggestion of  short segment narrowing of the right middle cerebral artery in the M2 region on the maximum intensity projection images. No apparent aneurysm. No dural sinus occlusion. No large completed infarct.    CT perfusion:  No significant perfusion defect.    This is a preliminary wet read by Dr. Mike Pearson at 2340 hours. Final interpretation will be provided by neuroradiology. Please refer to final interpretation for detailed findings and any further recommendations.    NOTIFICATION: Critical Value/emergent results were called by telephone at the time of interpretation on 9/24/2019 11:57 PM to RICHARD DOWNEY MD who verbally acknowledged these results.    CT CEREBRAL PERFUSION W WO CONTRAST, CTA Neck, CTA Head final report    INDICATION:   Above    TECHNIQUE:   CT angiogram of the head and neck with nonionic IV contrast. Dose recorded in the patient's chart. Initial noncontrast imaging obtained. 3-D reconstructions were obtained and reviewed.  Evaluation for a significant carotid arterial stenosis is  based on  the NASCET criteria. Radiation dose reduction techniques included automated exposure control or exposure modulation based on body size. Count of known CT and cardiac nuc med studies performed in previous 12 months: 0.     COMPARISON:   Earlier noncontrast head CT and MRI brain. Same day    FINDINGS:   CTA neck:  Study does not include the entirety of the aortic arch branch pattern. There are atherosclerotic vascular calcifications of the aortic arch and visualized great vessel origins with mild stenosis of the visualized left common carotid artery  proximally. There is extensive calcified atherosclerotic plaque throughout the visualized vasculature.    Right carotid system there is extensive calcified plaque right carotid bifurcation. There is at least 80% diameter stenosis of the proximal right internal carotid artery by NASCET criteria. This also stenosis of the origin of the right external carotid  artery. Calcified plaque involves the right carotid siphon with severe stenosis also. These findings would account for watershed infarct in the right anterior circulation on earlier MRI brain.    Left carotid system shows extensive calcified plaque. There is severe stenosis at the origin of the left external carotid artery. By NASCET criteria there is about 40% diameter stenosis proximal left internal carotid artery. Severe calcified plaque at  the left carotid siphon results in moderate to severe stenosis most severe at supraclinoid portion.    The left vertebral artery is dominant. There is disease at the origin of the left vertebral artery which is likely hemodynamically significant. Disease is seen throughout the neck with mild to moderate stenosis. The right vertebral artery is smaller but  also disease at its origin and throughout its course in the neck with mild to moderate stenosis. Both of disease is a pass over the arch of C1. Both supply the basilar.    CTA head:  There is high-grade stenosis of  the distal right M1 vessel about 5 mm in length which is another possible site of stenosis related to the watershed infarct. Irregular narrowing of the left M1 vessel is milder. There is extensive intracranial  vascular irregularity more distally in the MCA and RAJ territories consistent with intracranial atherosclerotic disease. There is mild narrowing at the origin of the right A1 vessel. No significant sized intercommunicate artery is seen. There is a left  posterior communicating artery with largely fetal origin left posterior cerebral artery distribution. Posterior circulation vessels also appear to be disease with intracranial atherosclerotic disease most likely. Irregular narrowing of the distal basilar  likely due to intracranial atherosclerotic disease results in up to moderate stenosis. No central intracranial vascular cut off is appreciated. No dural venous sinus thrombosis is suspected. No gross intracranial aneurysm    Emphysematous changes at lung apices. Extensive cervical spine degenerative change.    There is asymmetric soft tissue at the left anterior larynx just above the true cords measuring about 9 mm in diameter. This ovoid soft tissue mass is nonspecific and best assessed further with direct visualization if clinically feasible. Benign and  malignant entities possible.    CT perfusion also obtained    TECHNIQUE:   Axial CT images of the brain without and with intravenous contrast using cerebral perfusion protocol. Post-processing parametric maps were created using RAPID software and reviewed. Radiation dose reduction techniques included automated exposure control  or exposure modulation based on body size. CT and nuclear cardiology exams in the last 12 months: 0.      FINDINGS:   Arterial input function is optimal.     There is no evidence for significant sized core infarct or ischemic penumbra. Tmax greater than 4 seconds in the right parietal white matter consistent with distribution of the  watershed infarct seen on earlier MRI brain. MAXIMUM TEMPERATURE greater than  4 seconds volume is a 7 mL.            Impression:         1. By NASCET criteria at least 80% diameter stenosis at the proximal right internal carotid artery due to severe calcified atherosclerotic disease.  2. By NASCET criteria proximally 40% diameter stenosis at the left proximal internal carotid artery.  3. Both vertebral arteries are patent but diseased. Left dominant.  4. Extensive intracranial atherosclerotic disease with severe stenosis of the distal right M1 vessel. Likely also severe stenosis at the bilateral carotid siphons right greater than left.  5. Soft tissue lesion left anterior larynx just above the level of the true cord about 9 mm in dimension is nonspecific and best assessed with direct visualization if clinically feasible. Image 28 series 6.  6. CT perfusion shows elevated Tmax to the right parietal deep white matter consistent with the distribution of the watershed infarct in the right cerebral hemisphere. Volume of Tmax greater than 4 seconds a 7 mL. No large ischemic penumbra or core  infarct appreciated.         Signer Name: Nicolasa Leahy MD   Signed: 9/25/2019 9:08 AM   Workstation Name: [id number]    [facility]    CT Angiogram Head With & Without Contrast [[id number]] Collected:  09/24/19 2418     Updated:  09/25/19 0910    Narrative:       CT CEREBRAL PERFUSION W WO CONTRAST, CTA Neck, CTA Head     INDICATION:    84-year-old male with a history of stroke. Slurred speech today. Prior MRI earlier this evening demonstrated probable sequela of watershed infarcts in the right cerebral hemisphere involving the frontal lobe and right parietal white matter.    Head and neck CTA:    Neck - Heavy atherosclerotic change in the anterior and posterior circulation. The common carotid arteries, internal carotid arteries and vertebral arteries appear patent. There is advanced narrowing of the carotid  system on the right at the carotid  bifurcation with estimated luminal narrowing on the order of at least 75-80%. Narrowing on the left is on the order of about 30-40%. No apparent dissection.     Head - There is heavy atherosclerotic change of the internal carotid arteries bilaterally distal to the cervical level extending into the skull base and middle cerebral arteries bilaterally, but there is no distinct vessel cut off. There is suggestion of  short segment narrowing of the right middle cerebral artery in the M2 region on the maximum intensity projection images. No apparent aneurysm. No dural sinus occlusion. No large completed infarct.    CT perfusion:  No significant perfusion defect.    This is a preliminary wet read by Dr. Mike Pearson at 2340 hours. Final interpretation will be provided by neuroradiology. Please refer to final interpretation for detailed findings and any further recommendations.    NOTIFICATION: Critical Value/emergent results were called by telephone at the time of interpretation on 9/24/2019 11:57 PM to RICHARD DOWNEY MD who verbally acknowledged these results.    CT CEREBRAL PERFUSION W WO CONTRAST, CTA Neck, CTA Head final report    INDICATION:   Above    TECHNIQUE:   CT angiogram of the head and neck with nonionic IV contrast. Dose recorded in the patient's chart. Initial noncontrast imaging obtained. 3-D reconstructions were obtained and reviewed.  Evaluation for a significant carotid arterial stenosis is based on  the NASCET criteria. Radiation dose reduction techniques included automated exposure control or exposure modulation based on body size. Count of known CT and cardiac nuc med studies performed in previous 12 months: 0.     COMPARISON:   Earlier noncontrast head CT and MRI brain. Same day    FINDINGS:   CTA neck:  Study does not include the entirety of the aortic arch branch pattern. There are atherosclerotic vascular calcifications of the aortic arch and visualized great  vessel origins with mild stenosis of the visualized left common carotid artery  proximally. There is extensive calcified atherosclerotic plaque throughout the visualized vasculature.    Right carotid system there is extensive calcified plaque right carotid bifurcation. There is at least 80% diameter stenosis of the proximal right internal carotid artery by NASCET criteria. This also stenosis of the origin of the right external carotid  artery. Calcified plaque involves the right carotid siphon with severe stenosis also. These findings would account for watershed infarct in the right anterior circulation on earlier MRI brain.    Left carotid system shows extensive calcified plaque. There is severe stenosis at the origin of the left external carotid artery. By NASCET criteria there is about 40% diameter stenosis proximal left internal carotid artery. Severe calcified plaque at  the left carotid siphon results in moderate to severe stenosis most severe at supraclinoid portion.    The left vertebral artery is dominant. There is disease at the origin of the left vertebral artery which is likely hemodynamically significant. Disease is seen throughout the neck with mild to moderate stenosis. The right vertebral artery is smaller but  also disease at its origin and throughout its course in the neck with mild to moderate stenosis. Both of disease is a pass over the arch of C1. Both supply the basilar.    CTA head:  There is high-grade stenosis of the distal right M1 vessel about 5 mm in length which is another possible site of stenosis related to the watershed infarct. Irregular narrowing of the left M1 vessel is milder. There is extensive intracranial  vascular irregularity more distally in the MCA and RAJ territories consistent with intracranial atherosclerotic disease. There is mild narrowing at the origin of the right A1 vessel. No significant sized intercommunicate artery is seen. There is a left  posterior  communicating artery with largely fetal origin left posterior cerebral artery distribution. Posterior circulation vessels also appear to be disease with intracranial atherosclerotic disease most likely. Irregular narrowing of the distal basilar  likely due to intracranial atherosclerotic disease results in up to moderate stenosis. No central intracranial vascular cut off is appreciated. No dural venous sinus thrombosis is suspected. No gross intracranial aneurysm    Emphysematous changes at lung apices. Extensive cervical spine degenerative change.    There is asymmetric soft tissue at the left anterior larynx just above the true cords measuring about 9 mm in diameter. This ovoid soft tissue mass is nonspecific and best assessed further with direct visualization if clinically feasible. Benign and  malignant entities possible.    CT perfusion also obtained    TECHNIQUE:   Axial CT images of the brain without and with intravenous contrast using cerebral perfusion protocol. Post-processing parametric maps were created using RAPID software and reviewed. Radiation dose reduction techniques included automated exposure control  or exposure modulation based on body size. CT and nuclear cardiology exams in the last 12 months: 0.      FINDINGS:   Arterial input function is optimal.     There is no evidence for significant sized core infarct or ischemic penumbra. Tmax greater than 4 seconds in the right parietal white matter consistent with distribution of the watershed infarct seen on earlier MRI brain. MAXIMUM TEMPERATURE greater than  4 seconds volume is a 7 mL.            Impression:         1. By NASCET criteria at least 80% diameter stenosis at the proximal right internal carotid artery due to severe calcified atherosclerotic disease.  2. By NASCET criteria proximally 40% diameter stenosis at the left proximal internal carotid artery.  3. Both vertebral arteries are patent but diseased. Left dominant.  4. Extensive  intracranial atherosclerotic disease with severe stenosis of the distal right M1 vessel. Likely also severe stenosis at the bilateral carotid siphons right greater than left.  5. Soft tissue lesion left anterior larynx just above the level of the true cord about 9 mm in dimension is nonspecific and best assessed with direct visualization if clinically feasible. Image 28 series 6.  6. CT perfusion shows elevated Tmax to the right parietal deep white matter consistent with the distribution of the watershed infarct in the right cerebral hemisphere. Volume of Tmax greater than 4 seconds a 7 mL. No large ischemic penumbra or core  infarct appreciated.         Signer Name: Nicolasa Leahy MD   Signed: 9/25/2019 9:08 AM   Workstation Name: [id number]    [facility]    CT Angiogram Neck With & Without Contrast [[id number]] Collected:  09/24/19 2356     Updated:  09/25/19 0910    Narrative:       CT CEREBRAL PERFUSION W WO CONTRAST, CTA Neck, CTA Head     INDICATION:    84-year-old male with a history of stroke. Slurred speech today. Prior MRI earlier this evening demonstrated probable sequela of watershed infarcts in the right cerebral hemisphere involving the frontal lobe and right parietal white matter.    Head and neck CTA:    Neck - Heavy atherosclerotic change in the anterior and posterior circulation. The common carotid arteries, internal carotid arteries and vertebral arteries appear patent. There is advanced narrowing of the carotid system on the right at the carotid  bifurcation with estimated luminal narrowing on the order of at least 75-80%. Narrowing on the left is on the order of about 30-40%. No apparent dissection.     Head - There is heavy atherosclerotic change of the internal carotid arteries bilaterally distal to the cervical level extending into the skull base and middle cerebral arteries bilaterally, but there is no distinct vessel cut off. There is suggestion of  short segment  narrowing of the right middle cerebral artery in the M2 region on the maximum intensity projection images. No apparent aneurysm. No dural sinus occlusion. No large completed infarct.    CT perfusion:  No significant perfusion defect.    This is a preliminary wet read by Dr. Mike Pearson at 2340 hours. Final interpretation will be provided by neuroradiology. Please refer to final interpretation for detailed findings and any further recommendations.    NOTIFICATION: Critical Value/emergent results were called by telephone at the time of interpretation on 9/24/2019 11:57 PM to RICHARD DOWNEY MD who verbally acknowledged these results.    CT CEREBRAL PERFUSION W WO CONTRAST, CTA Neck, CTA Head final report    INDICATION:   Above    TECHNIQUE:   CT angiogram of the head and neck with nonionic IV contrast. Dose recorded in the patient's chart. Initial noncontrast imaging obtained. 3-D reconstructions were obtained and reviewed.  Evaluation for a significant carotid arterial stenosis is based on  the NASCET criteria. Radiation dose reduction techniques included automated exposure control or exposure modulation based on body size. Count of known CT and cardiac nuc med studies performed in previous 12 months: 0.     COMPARISON:   Earlier noncontrast head CT and MRI brain. Same day    FINDINGS:   CTA neck:  Study does not include the entirety of the aortic arch branch pattern. There are atherosclerotic vascular calcifications of the aortic arch and visualized great vessel origins with mild stenosis of the visualized left common carotid artery  proximally. There is extensive calcified atherosclerotic plaque throughout the visualized vasculature.    Right carotid system there is extensive calcified plaque right carotid bifurcation. There is at least 80% diameter stenosis of the proximal right internal carotid artery by NASCET criteria. This also stenosis of the origin of the right external carotid  artery. Calcified plaque  involves the right carotid siphon with severe stenosis also. These findings would account for watershed infarct in the right anterior circulation on earlier MRI brain.    Left carotid system shows extensive calcified plaque. There is severe stenosis at the origin of the left external carotid artery. By NASCET criteria there is about 40% diameter stenosis proximal left internal carotid artery. Severe calcified plaque at  the left carotid siphon results in moderate to severe stenosis most severe at supraclinoid portion.    The left vertebral artery is dominant. There is disease at the origin of the left vertebral artery which is likely hemodynamically significant. Disease is seen throughout the neck with mild to moderate stenosis. The right vertebral artery is smaller but  also disease at its origin and throughout its course in the neck with mild to moderate stenosis. Both of disease is a pass over the arch of C1. Both supply the basilar.    CTA head:  There is high-grade stenosis of the distal right M1 vessel about 5 mm in length which is another possible site of stenosis related to the watershed infarct. Irregular narrowing of the left M1 vessel is milder. There is extensive intracranial  vascular irregularity more distally in the MCA and RAJ territories consistent with intracranial atherosclerotic disease. There is mild narrowing at the origin of the right A1 vessel. No significant sized intercommunicate artery is seen. There is a left  posterior communicating artery with largely fetal origin left posterior cerebral artery distribution. Posterior circulation vessels also appear to be disease with intracranial atherosclerotic disease most likely. Irregular narrowing of the distal basilar  likely due to intracranial atherosclerotic disease results in up to moderate stenosis. No central intracranial vascular cut off is appreciated. No dural venous sinus thrombosis is suspected. No gross intracranial  aneurysm    Emphysematous changes at lung apices. Extensive cervical spine degenerative change.    There is asymmetric soft tissue at the left anterior larynx just above the true cords measuring about 9 mm in diameter. This ovoid soft tissue mass is nonspecific and best assessed further with direct visualization if clinically feasible. Benign and  malignant entities possible.    CT perfusion also obtained    TECHNIQUE:   Axial CT images of the brain without and with intravenous contrast using cerebral perfusion protocol. Post-processing parametric maps were created using RAPID software and reviewed. Radiation dose reduction techniques included automated exposure control  or exposure modulation based on body size. CT and nuclear cardiology exams in the last 12 months: 0.      FINDINGS:   Arterial input function is optimal.     There is no evidence for significant sized core infarct or ischemic penumbra. Tmax greater than 4 seconds in the right parietal white matter consistent with distribution of the watershed infarct seen on earlier MRI brain. MAXIMUM TEMPERATURE greater than  4 seconds volume is a 7 mL.            Impression:         1. By NASCET criteria at least 80% diameter stenosis at the proximal right internal carotid artery due to severe calcified atherosclerotic disease.  2. By NASCET criteria proximally 40% diameter stenosis at the left proximal internal carotid artery.  3. Both vertebral arteries are patent but diseased. Left dominant.  4. Extensive intracranial atherosclerotic disease with severe stenosis of the distal right M1 vessel. Likely also severe stenosis at the bilateral carotid siphons right greater than left.  5. Soft tissue lesion left anterior larynx just above the level of the true cord about 9 mm in dimension is nonspecific and best assessed with direct visualization if clinically feasible. Image 28 series 6.  6. CT perfusion shows elevated Tmax to the right parietal deep white matter  consistent with the distribution of the watershed infarct in the right cerebral hemisphere. Volume of Tmax greater than 4 seconds a 7 mL. No large ischemic penumbra or core  infarct appreciated.         Signer Name: Nicolasa Leahy MD   Signed: 9/25/2019 9:08 AM   Workstation Name: [id number]    [facility]    CT Head Without Contrast Stroke Protocol [[id number]] Collected:  09/24/19 1339     Updated:  09/24/19 2304    Narrative:       EXAMINATION: CT HEAD WO CONTRAST STROKE PROTOCOL- 09/24/2019     INDICATION: Stroke     TECHNIQUE: 5 mm unenhanced images through the brain     The radiation dose reduction device was turned on for each scan per the  ALARA (As Low as Reasonably Achievable) protocol.     COMPARISON: 08/02/2016     FINDINGS: Previous exam, by history was negative. Today's study shows  the calvarium to appear intact. There are small old left cerebellar  infarcts. There is no evidence to suggest acute infarction, no evidence  of hemorrhage hydrocephalus mass or mass effect or abnormal extra-axial  collection.       Impression:       Small old left cerebellar infarcts unchanged. No evidence of  acute intracranial disease.     NOTE: Exam time is shown as 12:48 PM. Study was reviewed on the CT scan  monitor and discussed with Dr. Patino at 12:54 PM.     D:  09/24/2019  E:  09/24/2019        This report was finalized on 9/24/2019 11:01 PM by DR. Rogelio Cortez MD.       MRI Brain Without Contrast [[id number]] Collected:  09/24/19 2154     Updated:  09/24/19 2156    Narrative:       INDICATION:    Some wall patient and noted facial droop with slurred speech. New focal neurological deficit.    TECHNIQUE:   MRI of the brain without contrast. Study is very limited. Only sagittal T1 and axial diffusion imaging could be obtained.      COMPARISON:    Head CT 9/24/2019. MRI brain 6/4/2017.    FINDINGS:  There are several small foci of restricted diffusion in the right cerebral hemisphere including a  small 5 mm focus in the right superolateral frontal cortex smaller adjacent right frontal cortical and subcortical lesions extending to the right side  anterolaterally as well as a small lesion within the right parietal  white matter. There is no appreciable mass affect. This distribution raises concern for watershed ischemic insult. Thromboembolic insult with peripheral thromboemboli possible. Further  evaluation recommended. There are chronic cerebellar hemispheric locations left greater than right. There is no midline shift. There is nonspecific diffuse calvarial thickening. Is chronic with somewhat cellular marrow for age group.      Impression:         1. Very limited study shows subtle areas of restricted diffusion consistent with recent small infarcts the right cerebral hemisphere. Peripheral distribution predominantly in the right frontal lobe and a small lesion in the right parietal white matter  raises concern for a watershed type ischemic insult versus multiple tiny peripheral thromboemboli. Please assess further clinically. No mass effect is appreciated. Pre-existing small vessel disease is partly redemonstrated.    NOTIFICATION: Critical Value/emergent results were called by telephone at the time of interpretation on 9/24/2019 9:46 PM to [address] [place] [address], Bal, and Timmy Cordova MD who verbally acknowledged these results.        Signer Name: Nicolasa Leahy MD   Signed: 9/24/2019 9:54 PM   Workstation Name: [id number]    [facility]          Results for orders placed during the hospital encounter of 09/24/19   Duplex Carotid Ultrasound CAR    Narrative · Right internal carotid artery stenosis of >70%.  · Left internal carotid artery stenosis of >70%.          Results for orders placed during the hospital encounter of 09/24/19   Duplex Carotid Ultrasound CAR    Narrative · Right internal carotid artery stenosis of >70%.  · Left internal carotid artery stenosis of >70%.           Results for orders placed during the hospital encounter of 09/24/19   Adult Transthoracic Echo Complete W/ Cont if Necessary Per Protocol (With Agitated Saline)    Narrative · Estimated EF = 70%.  · Left ventricular wall thickness is consistent with concentric   hypertrophy.  · Mild aortic valve stenosis is present.  · Mild mitral valve regurgitation is present            Discharge Details        Discharge Medications      New Medications      Instructions Start Date   amoxicillin-clavulanate 600-42.9 MG/5ML suspension  Commonly known as:  AUGMENTIN   600 mg, Oral, Every 12 Hours Scheduled      aspirin 81 MG chewable tablet  Replaces:  aspirin 325 MG tablet   81 mg, Oral, Daily   Start Date:  10/4/2019     atorvastatin 80 MG tablet  Commonly known as:  LIPITOR   80 mg, Oral, Nightly      furosemide 20 MG tablet  Commonly known as:  LASIX   10 mg, Oral, Daily      lactobacillus acidophilus capsule capsule   1 capsule, Oral, Daily   Start Date:  10/4/2019     predniSONE 20 MG tablet  Commonly known as:  DELTASONE   40 mg, Oral, Daily With Breakfast   Start Date:  10/4/2019        Changes to Medications      Instructions Start Date   esomeprazole 40 MG capsule  Commonly known as:  nexIUM  What changed:  when to take this   40 mg, Oral, 2 Times Daily      gabapentin 600 MG tablet  Commonly known as:  NEURONTIN  What changed:  how much to take   300 mg, Oral, 3 Times Daily         Continue These Medications      Instructions Start Date   albuterol sulfate  (90 Base) MCG/ACT inhaler  Commonly known as:  VENTOLIN HFA   INHALE 1-2 PUFFS EVERY 4 TO 6 HOURS AS NEEDED      ALLEGRA ALLERGY 180 MG tablet  Generic drug:  fexofenadine   180 mg, Oral, Daily      clopidogrel 75 MG tablet  Commonly known as:  PLAVIX   TAKE 1 TABLET BY MOUTH EVERY DAY AT NIGHT      dicyclomine 10 MG capsule  Commonly known as:  BENTYL   TAKE ONE CAPSULE BY MOUTH BEFORE MEALS AND ONE CAPSULE BY MOUTH EVERY NIGHT AT BEDTIME       fluticasone-salmeterol 500-50 MCG/DOSE DISKUS  Commonly known as:  ADVAIR DISKUS   TAKE 1 PUFF BY MOUTH TWICE A DAY      HYDROcodone-acetaminophen 7.5-325 MG per tablet  Commonly known as:  NORCO   1 tablet, Oral, Every 6 Hours PRN      nitroglycerin 0.4 MG SL tablet  Commonly known as:  NITROSTAT   One tab sublingual prn      sucralfate 1 g tablet  Commonly known as:  CARAFATE   1 tablet, Oral, 4 Times Daily Before Meals & Nightly      TUDORZA PRESSAIR 400 MCG/ACT aerosol powder  powder for inhalation  Generic drug:  aclidinium bromide   TAKE 1 PUFF BY MOUTH EVERY DAY         Stop These Medications    aspirin 325 MG tablet  Replaced by:  aspirin 81 MG chewable tablet     lisinopril-hydrochlorothiazide 20-25 MG per tablet  Commonly known as:  PRINZIDE,ZESTORETIC            Allergies   Allergen Reactions   • Morphine And Related      nausea   • Pneumovax [Pneumococcal Polysaccharide Vaccine]          Discharge Disposition:  Home-Health Care [facility]    Diet:  Hospital:  Diet Order   Procedures   • Diet Soft Texture; Chopped; Thin; Cardiac, Vegetarian; [id number]: Allows Dairy Products, Eggs & Fish / Seafood     Discharge:   Diet Instructions     Diet: Specialty Diet, Cardiac; Thin Liquids, No Restrictions; Low Sodium      Discharge Diet:   Specialty Diet  Cardiac       Fluid Consistency:  Thin Liquids, No Restrictions    Specialty Diets:  Low Sodium    Crushed medications        MBS/VFSS Reason for Referral 9/30/19  Patient was referred for a MBS to assess the efficiency of his/her swallow function, rule out aspiration and make recommendations regarding safe dietary consistencies, effective compensatory strategies, and safe eating environment.             Recommendations/Treatment  SLP Swallowing Diagnosis: (P) mild, oral dysfunction, pharyngeal dysfunction, esophageal dysfunction, other (see comments)(Zenker's diverticulum per rad PA)  Functional Impact: (P) risk of aspiration/pneumonia  Swallow Criteria for  Skilled Therapeutic Interventions Met: (P) patient/family declined skilled intervention at this time  Therapy Frequency (Swallow): (P) evaluation only  SLP Diet Recommendation: (P) soft textures, chopped, thin liquids  Recommended Diagnostics: VFSS (MBS), other (see comments)(w/ limited UGI)  Recommended Precautions and Strategies: (P) upright posture during/after eating, alternate between small bites of food and sips of liquid, small bites of food and sips of liquid, other (see comments)(General aspiration and reflux precautions )  SLP Rec. for Method of Medication Administration: (P) meds crushed, with pudding or applesauce, as tolerated(Followed by liquid wash)  Monitor for Signs of Aspiration: (P) notify SLP if any concerns  Anticipated Dischage Disposition: (P) unknown  Demonstrates Need for Referral to Another Service: (P) gastroenterology, other (see comments)(given concern for Zenker's diverticulum )    Instrumental Set-up  Utensils Used: (P) spoon, cup, straw  Consistencies Trialed: (P) thin liquids, pudding thick, regular textures    Oral Preparation/ Oral Phase  Oral Prep Phase: (P) impaired oral phase of swallowing  Oral Transit Phase: (P) WFL  Oral Residue: (P) impaired  Oral Preparatory Phase: (P) prolonged manipulation, oral holding  Oral Holding: (P) all consistencies tested, secondary to impaired cognitive status  Prolonged Manipulation: (P) all consistencies tested     Impaired Oral Residue: (P) diffuse residue throughout oral cavity  Diffuse Residue throughout Oral Cavity: (P) all consistencies tested  Response to Oral Residue: (P) cleared residue, with liquid wash    Pharyngeal Phase  Initiation of Pharyngeal Swallow: (P) WFL, bolus in valleculae  Pharyngeal Phase: (P) impaired pharyngeal phase of swallowing  Penetration During the Swallow: (P) thin liquids, other (see comments)(x1, cleared upon completion of the swallow)  Response to Penetration: (P) transient, other (see comments)(Cleared when  swallow completed )  VFSS Summary: (P) Mild oral dysphagia & Mild pharyngoesophageal dysphagia. Increased oral prep time and minimal oral residue noted 2' edentulous status & cognition. Oral holding noted t/o eval. Transient penetration noted once with thin via straw which was cleared upon the completion of the swallow. Bolus was in the valleculae before the initiation of the swallow with most trials. Esophageal retention noted 2' Zenker's diverticulum (per Radiology PA). Minimal retrograde flow of material to pyriforms t/o exam, but cleared w/ continued swallows. Would benefit from strict reflux precautions. Material collected in diverticulum primarily with pudding and solid trials. Liquid wash reduced amount of pocketed material in diverticulum. Recommend soft chopped and thin liquids, meds crushed in puree. Alternate solids and liquids as this helped to reduce pocketed material in Zenker's diverticulum. Pt declines SLP follow-up, and agreeable to diet modifications and recommendations. SLP signing off on dysphagia. Pt would benefit from consult with GI to address Zenker's as indicated.     Cervical Esophageal Phase  Esophageal Phase: (P) esophageal retention, other (see comments), see radiology report for further details(2' Zenker's diverticulum (per Radiology PA))                                 Discharge Activity:   Activity Instructions     Activity as Tolerated              CODE STATUS:    Code Status and Medical Interventions:   Ordered at: 09/24/19 6858     Limited Support to NOT Include:    Intubation     Level Of Support Discussed With:    Patient    Next of Kin (If No Surrogate)     Code Status:    No CPR     Medical Interventions (Level of Support Prior to Arrest):    Limited         No future appointments.    Additional Instructions for the Follow-ups that You Need to Schedule     Ambulatory Referral to Home Health   As directed      Face to Face Visit Date:  10/3/2019    Follow-up provider for Plan of  Care?:  I treated the patient in an acute care facility and will not continue treatment after discharge.    Follow-up provider:  GLORIA JOHNS [[id number]]    Reason/Clinical Findings:  Stroke    Describe mobility limitations that make leaving home difficult:  Impaired mobility    Nursing/Therapeutic Services Requested:  Skilled Nursing Physical Therapy Occupational Therapy    Skilled nursing orders:  COPD management Neurovascular assessments    PT orders:  Therapeutic exercise Gait Training Strengthening Home safety assessment    Weight Bearing Status:  Full Weight Bearing    Occupational orders:  Activities of daily living Energy conservation Strengthening Home safety assessment    Frequency:  1 Week 1         Discharge Follow-up with PCP   As directed       Currently Documented PCP:    Gilmar Campos MD    PCP Phone Number:    [phone]     Follow Up Details:  PCP 5-7 days recommend repeat BMP and CBC         Discharge Follow-up with Specified Provider: Neurosurgery 1 month   As directed      To:  Neurosurgery 1 month               Time Spent on Discharge:  40 minutes    Electronically signed by Gloria Johns DO, 10/03/19, 12:18 PM.

## 2019-10-03 NOTE — PROGRESS NOTES
Continued Stay Note  [facility]     Patient Name: Angela Berkowitz  MRN: [id number]  Today's Date: 10/3/2019    Admit Date: 9/24/2019    Discharge Plan     Row Name 10/03/19 1117       Plan    Plan  Home with [facility]    Patient/Family in Agreement with Plan  yes    Plan Comments  Spoke with Jero at [facility] made the referral and placed order. Spoke with patient at bedside. Denies any needs at this time. CM will continue to follow.    Final Discharge Disposition Code  06 - home with home health care        Discharge Codes    No documentation.       Expected Discharge Date and Time     Expected Discharge Date Expected Discharge Time    Oct 3, 2019             Sd Rojas RN

## 2019-10-03 NOTE — PROGRESS NOTES
Case Management Discharge Note    Final Note: Plan is home with family and [facility]. Son will transport and bring portable oxygen tank at round 1400. Denies any other discharge needs.    Destination      No service has been selected for the patient.      Durable Medical Equipment - Selection Complete      Service Provider Request Status Selected Services Address Phone Number Fax Number    [facility] Selected Durable Medical Equipment [address], [facility] [address] [phone] --      Dialysis/Infusion      No service has been selected for the patient.      Home Medical Care - Selection Complete      Service Provider Request Status Selected Services Address Phone Number Fax Number    [facility] Selected Home Health Services [address], [facility] [id number] [phone]      Therapy      No service has been selected for the patient.      Community Resources      No service has been selected for the patient.             Final Discharge Disposition Code: 06 - home with home health care

## 2019-10-03 NOTE — DISCHARGE INSTR - ACTIVITY
Please crush your medications and take with applesauce, pudding or ice cream.    Continue home oxygen use.  Currently on 4 liters via nasal cannula.

## 2019-10-04 NOTE — TELEPHONE ENCOUNTER
JOSE MANUEL          HOME CARE [facility] NURSE CALLED WANTING TO UPDATE DR RICHARD ON THE PT AND SEE IF HE WOULD FOLLOW HOME HEALTH ORDERS.         SHE HAD STAGE 2 PRESSURE INJURY ON HER BUTTOCK, SHE WAS ADMITTED TO HOME HEALTH.       PLEASE NOTIFY JAMEL IF JOSE MANUEL WILL FOLLOW THE HOME HEALTH ORDERS.         CALL BACK [phone]      OKAY TO LEAVE A VMAIL

## 2019-10-04 NOTE — OUTREACH NOTE
Prep Survey      Responses   Facility patient discharged from?  [facility]   Is patient eligible?  Yes   Discharge diagnosis  CVA, smoker, CKD, afib, DM II, Hgb at admission 5.9, pt declined investigation , transfused 2 units. HGB remained stable, O2 dependent    Does the patient have one of the following disease processes/diagnoses(primary or secondary)?  Stroke (TIA)   Does the patient have Home health ordered?  Yes   What is the Home health agency?   [facility]   Is there a DME ordered?  No   What DME was ordered?  Currently uses BSC, hospital bed, grab bar, shower chair, rolling walker and has ramp   Comments regarding appointments  See AVS   Medication alerts for this patient  Multiple meds to review   General alerts for this patient  Lives with family.    Prep survey completed?  Yes          Nara Wagner RN

## 2019-10-04 NOTE — OUTREACH NOTE
"TCM call completed.  See TCM flowsheet for details.  Does have upcoming hospital follow up appt with Dr. Campos 10/9/19.  \"I am doing good.\"  Is using walker to assist her to bedside commode.  States she is now able to eat and drink anything she wants.  No difficulty swollowing.  No nausea and vomiting.  Speech was clear.  Says breathing is at her normal.  Wears O2 at 4L per nasal cannula 24/7. Had not picked up medicine yet, but states she just got a call that it was ready and son will .  Discussed her medicines and the importance of getting started on them.  States home health has not contacted her yet, but knows she is to have home PT and OT.  Denies any fever, chills, or chest pain.  No issues with bowels or bladder.  Denies any needs at present, confirmed Dr. Campos appt for 10/9/19 and appreciated the call.    "

## 2019-10-07 NOTE — OUTREACH NOTE
Stroke Week 1 Survey      Responses   Facility patient discharged from?  [facility]   Does the patient have one of the following disease processes/diagnoses(primary or secondary)?  Stroke (TIA)   Is there a successful TCM telephone encounter documented?  Yes          Marylin Gardiner RN

## 2019-10-09 NOTE — PROGRESS NOTES
Transitional Care Follow Up Visit  Subjective     Angela Berkowitz is a 84 y.o. female who presents for a transitional care management visit.    Within 48 business hours after discharge our office contacted her via telephone to coordinate her care and needs.      I reviewed and discussed the details of that call along with the discharge summary, hospital problems, inpatient lab results, inpatient diagnostic studies, and consultation reports with Angela.     Current outpatient and discharge medications have been reconciled for the patient.  Reviewed by: Gilmar Campos MD      Date of TCM Phone Call 8/5/2016 1/18/2017 6/6/2017 6/7/2017 10/4/2019   [facility]   Date of Admission 8/1/2016 1/14/2017 6/2/2017 6/2/2017 9/24/2019   Date of Discharge 8/4/2016 1/17/2017 6/5/2017 6/5/2017 10/3/2019   Risk for Readmission (LACE Score) - - 12 12 -   Discharge Disposition Home or Self Care Home or Self Care Home or Self Care Home or Self Care [facility]     Risk for Readmission (LACE) Score: 14 (10/3/2019  6:00 AM)      History of Present Illness   Course During Hospital Stay:    Son noticed left facial droop and took her to hospital, was discharged on 10/3/19  Neurologically she is back to baseline  Baseline is weak, hard to ambualte and move about house.    She has been having issues with breathing  She is on chronic oxygen, no change with this.  Hard to breath and a cough that is productive  No fevers.  Son and pt feel this is slightly worse.    Home health is coming out.  Pt not enthusiastic about them  She simply would like to stay at home unbothered!  She just does not have much motivation to get stronger     The following portions of the patient's history were reviewed and updated as appropriate: allergies, current medications, past family history, past medical history, past social  history, past surgical history and problem list.    Review of Systems   Constitutional: Negative.  Negative for fever.   HENT: Positive for congestion.    Eyes: Negative.    Respiratory: Positive for cough and shortness of breath.    Cardiovascular: Negative.  Negative for chest pain and palpitations.   Gastrointestinal: Positive for diarrhea.   Musculoskeletal: Positive for gait problem.   Skin: Negative.    Psychiatric/Behavioral: Negative.  Negative for dysphoric mood. The patient is not nervous/anxious.    All other systems reviewed and are negative.      Objective   Physical Exam   Constitutional: She is oriented to person, place, and time. She appears well-developed and well-nourished. No distress.   In wheelchair, oxygen in place   HENT:   Head: Normocephalic and atraumatic.   Cardiovascular: Normal rate, regular rhythm and normal heart sounds.   Pulmonary/Chest: Effort normal. Tachypnea (mild) noted. She has wheezes.   Abdominal: Soft. Bowel sounds are normal. She exhibits no distension. There is no tenderness.   Neurological: She is alert and oriented to person, place, and time.   Psychiatric: She has a normal mood and affect. Her behavior is normal. Judgment and thought content normal.   Nursing note and vitals reviewed.      Assessment/Plan   Angela was seen today for follow-up.    Diagnoses and all orders for this visit:    Hospital discharge follow-up    Cerebrovascular accident (CVA), unspecified mechanism (CMS/HCC)  -     atorvastatin (LIPITOR) 80 MG tablet; Take 1 tablet by mouth Every Night.  -     clopidogrel (PLAVIX) 75 MG tablet; Take 1 tablet by mouth every night at bedtime.    Cough  -     XR Chest PA & Lateral    Shortness of breath  -     XR Chest PA & Lateral    Weakness    Panlobular emphysema (CMS/HCC)  -     aclidinium bromide (TUDORZA PRESSAIR) 400 MCG/ACT aerosol powder  powder for inhalation; Inhale 1 puff Daily.  -     fluticasone-salmeterol (ADVAIR DISKUS) 500-50 MCG/DOSE DISKUS;  TAKE 1 PUFF BY MOUTH TWICE A DAY    Non-STEMI (non-ST elevated myocardial infarction) (CMS/HCC)  -     clopidogrel (PLAVIX) 75 MG tablet; Take 1 tablet by mouth every night at bedtime.    Pure hypercholesterolemia  -     atorvastatin (LIPITOR) 80 MG tablet; Take 1 tablet by mouth Every Night.    son feels like she is back to baseline in regards to her strength.  She does not leave the house then nor now.  Uses a walker at home to ambulate and a wheelchair when leaving the home.  We discussed that she soul work with home health, PT and TO, to develop her strngth.  Pt has low motivation for this.    No further neurologic issues since discharge.  The TIA cleared up pretty fast.  Reviewed medications and will try to optimize BP and cholesterol control.  Reviewed labs and imaging data from hospital stay.    Her breathing is more labored, will check CXR and make medicine decisions based on results.

## 2019-10-11 NOTE — OUTREACH NOTE
Stroke Week 2 Survey      Responses   Facility patient discharged from?  [facility]   Does the patient have one of the following disease processes/diagnoses(primary or secondary)?  Stroke (TIA)   Week 2 attempt successful?  Yes   Call start time  1537   Rescheduled  Rescheduled-pt requested [Family member states she can't talk right now.]   Discharge diagnosis  CVA, smoker, CKD, afib, DM II, Hgb at admission 5.9, pt declined investigation , transfused 2 units. HGB remained stable, O2 dependent           Marylin Gardiner RN

## 2019-10-11 NOTE — TELEPHONE ENCOUNTER
Can we confirm dose.  Is she taking 600 TID or 300 TID?     I thought it was 600 TID, will send in that dose as that is what she has been on  Let me know if incorrect

## 2019-10-11 NOTE — TELEPHONE ENCOUNTER
JOSE MANUEL    MED REFILL ON GABAPENTIN 600 MG TABLETS.    PT ASKED IF HE NEEDED TO COME AND  OR IF IT GETS SENT TO THE PHARMACY.  PLEASE ADVISE THANKS

## 2019-10-12 NOTE — OUTREACH NOTE
Prep Survey      Responses   Facility patient discharged from?  [facility]   Does the patient have one of the following disease processes/diagnoses(primary or secondary)?  Stroke (TIA)          Marylin Gardiner RN

## 2019-10-14 NOTE — OUTREACH NOTE
Stroke Week 2 Survey      Responses   Facility patient discharged from?  [facility]   Does the patient have one of the following disease processes/diagnoses(primary or secondary)?  Stroke (TIA)   Week 2 attempt successful?  No   Rescheduled  Revoked          Vladimir Melendrez RN

## 2019-10-18 NOTE — TELEPHONE ENCOUNTER
DR JOSE MANUEL FERRELL FROM [facility] IS CALLING ABOUT WOUND CARE FOR THE WOUND ON HER BOTTOM. CAN SHE USED HYDRO EDY BLUE OR CALCIUM ALGATE AG ON HER BOTTOM WOUND IF NEEDED? SHE HAS SOFT TISSUE IN THAT WOUND.  [phone]

## 2019-10-18 NOTE — TELEPHONE ENCOUNTER
Informed Joanne of this message, she said that she may not need to use it but wanted approval in case.

## 2019-10-29 NOTE — TELEPHONE ENCOUNTER
ARCHANA FROM HOME HEALTH CALLED STATING PT IS SPITTING UP [facility] AND WANTS TO KNOW IF DR RICHARD IS WANTING PT TO PRESCRIBE MORE ANTIBIOTICS FOR PT

## 2019-11-12 NOTE — TELEPHONE ENCOUNTER
pts home health nurse called stating pt is requesting Dr Campos put in a referral for Hospice to come out and guide her any questions Lisa can be reached [phone]

## 2019-11-12 NOTE — TELEPHONE ENCOUNTER
new from home health called and needing to speak w/dr negron regarding pts wound care. Phone number to call [phone]

## 2019-11-13 NOTE — TELEPHONE ENCOUNTER
Spoke with New, wound not healing but pt refuses surgical eval and wound clinic.  new asks about hospice evaluation, she feels this is appropriate.

## 2019-12-02 ENCOUNTER — TELEPHONE (OUTPATIENT)
Dept: FAMILY MEDICINE CLINIC | Facility: CLINIC | Age: 84
End: 2019-12-02

## 2019-12-02 RX ORDER — DICYCLOMINE HYDROCHLORIDE 10 MG/1
CAPSULE ORAL
Qty: 360 CAPSULE | Refills: 0 | Status: SHIPPED | OUTPATIENT
Start: 2019-12-02

## 2019-12-02 NOTE — TELEPHONE ENCOUNTER
DR JOSE MANUEL [facility] IS CALLING TO REPORT HER DEATH AT [facility] ON 12/1/19 AT 12:20PM.

## 2019-12-09 ENCOUNTER — OUTSIDE FACILITY SERVICE (OUTPATIENT)
Dept: HOSPITALIST | Facility: HOSPITAL | Age: 84
End: 2019-12-09

## 2023-08-24 NOTE — TELEPHONE ENCOUNTER
Lm for pt/family to return call   Patient was given the number to imaging to call to ask the questions she wanted to know about the Echo test.

## 2024-08-01 NOTE — TELEPHONE ENCOUNTER
i did speak with pt she advised ems was just there she refused their service and refused and appt and ER.   No

## 2024-10-07 NOTE — TELEPHONE ENCOUNTER
----- Message from Theresa Arias sent at 2/9/2017 11:36 AM EST -----  Contact: JOSE MANUEL STANLEY [facility]  CALLED TO ADVISE PATIENT HAS A VERY HIGH BP YESTERDAY AND A MEDICATION WAS CALLED IN YESTERDAY THE PATIENT DIDN'T TAKE HER REGULARLY SCHEDULED LOSINOPRIL AFTER SHE TOOK HER REGULAR SCRIPT HER BP CAME DOWN HOW EVER WHEN SHE TOOK HER LOSINOPRIL.  SHE ALSO TOOK HER REGULAR SCRIPT TODAY THAT HER BP /66. PATIENT ADVISES THAT SHE CANNOT AFFORD THE ADDITIONAL MED THAT WAS CALLED IN. JADEN ADVISES HER BP SEEMS TO BE OK WHEN SHE TAKES WHAT ALREADY HAS BEEN SCRIPTED. SHE IS STOPPING BY PATIENT TOMORROW JUST TO FOLLOW UP WITH HIS. SHE WAS ADVISED YOU WOULD NOT BE IN UNTIL Friday SHE THOUGHT THAT WOULD BE FINE. JADEN CAN BE REACHED  [phone]  
Left detailed vm for jory  
Ok, lets simply resume her regular blood pressure medications, will DC new medicine at this time and simply monitor BP.  
Render Risk Assessment In Note?: no
Detail Level: Simple
Additional Notes: Discussed in detail with pt that she is better than before, but still slightly flared.\\nAdvised pt of she is still flaring in 2 weeks, to give us a call and to have her Dupixent delivered.\\nPt declined refills of Clobetasol and Tacrolimus, advised to call office is more is needed prior to next visit.\\nPt to follow up in 3 months.